# Patient Record
Sex: FEMALE | Race: WHITE | NOT HISPANIC OR LATINO | Employment: OTHER | URBAN - METROPOLITAN AREA
[De-identification: names, ages, dates, MRNs, and addresses within clinical notes are randomized per-mention and may not be internally consistent; named-entity substitution may affect disease eponyms.]

---

## 2017-01-26 ENCOUNTER — OFFICE VISIT (OUTPATIENT)
Dept: FAMILY MEDICINE | Facility: CLINIC | Age: 77
End: 2017-01-26
Payer: MEDICARE

## 2017-01-26 VITALS
DIASTOLIC BLOOD PRESSURE: 82 MMHG | HEIGHT: 65 IN | HEART RATE: 108 BPM | BODY MASS INDEX: 29.57 KG/M2 | OXYGEN SATURATION: 95 % | WEIGHT: 177.5 LBS | SYSTOLIC BLOOD PRESSURE: 142 MMHG

## 2017-01-26 PROCEDURE — 99213 OFFICE O/P EST LOW 20 MIN: CPT | Mod: S$GLB,,, | Performed by: FAMILY MEDICINE

## 2017-01-26 PROCEDURE — 99999 PR PBB SHADOW E&M-EST. PATIENT-LVL III: CPT | Mod: PBBFAC,,, | Performed by: FAMILY MEDICINE

## 2017-01-26 NOTE — MR AVS SNAPSHOT
[facility]  [address] [facility] [address]  [facility] [address]  Phone: [phone]  Fax: [phone]                  Sari Biswas   2017 3:20 PM   Office Visit    Description:  Female : 1940   Provider:  Turner Anne MD   Department:  [facility]           Reason for Visit     Urinary Tract Infection                To Do List           Goals (5 Years of Data)     None      [facility]     [facility] On [facility] Care Line -  Assistance  Registered nurses in the [facility] Center provide clinical advisement, health education, appointment booking, and other advisory services.  Call for this free service at [phone].             Medications           Message regarding Medications     Verify the changes and/or additions to your medication regime listed below are the same as discussed with your clinician today.  If any of these changes or additions are incorrect, please notify your healthcare provider.        STOP taking these medications     clobetasol (TEMOVATE) 0.05 % cream Apply twice daily to affected areas. Use as needed.           Verify that the below list of medications is an accurate representation of the medications you are currently taking.  If none reported, the list may be blank. If incorrect, please contact your healthcare provider. Carry this list with you in case of emergency.           Current Medications     amlodipine (NORVASC) 5 MG tablet Take 1 tablet (5 mg total) by mouth once daily.    CALCIUM CARBONATE/VITAMIN D3 (CALCIUM+D ORAL) once daily. No Sig Provided    cranberry 400 mg Cap Take by mouth.    ERGOCALCIFEROL, VITAMIN D2, (VITAMIN D ORAL) Take by mouth once daily.     LACTOBACILLUS ACIDOPHILUS (PROBIOTIC ORAL) Take by mouth.    multivitamin-minerals-lutein (CENTRUM SILVER) Tab Every day    VIT C/E/ZN/COPPR/LUTEIN/ZEAXAN (PRESERVISION AREDS 2 ORAL) Take by mouth.           Clinical Reference Information           Vital  "Signs - Last Recorded  Most recent update: 1/26/2017  3:38 PM by Jo Jennings LPN    BP Pulse Ht Wt SpO2 BMI    (!) 142/82 108 5' 5" (1.651 m) 80.5 kg (177 lb 7.5 oz) 95% 29.53 kg/m2      Blood Pressure          Most Recent Value    BP  (!)  142/82      Allergies as of 1/26/2017     No Known Drug Allergies      Immunizations Administered on Date of Encounter - 1/26/2017     None      "

## 2017-01-29 NOTE — PROGRESS NOTES
Subjective:       Patient ID: Sari Biswas is a 76 y.o. female.    Chief Complaint: Rectocele    HPI     Reports that she has seen 2 gynecologists over the past 6 months. One reported that she had a cystocele and the other reported, she had a cystocele and rectocele.    Reports no urinary incontinence or retention.  Reports no constipation.  Reports lemon sized vaginal mass while straining that is uncomfortable but not painful.        Review of Systems      Review of Systems   Constitutional: Negative for fever and chills.   HENT: Negative for hearing loss and neck stiffness.    Eyes: Negative for redness and itching.   Respiratory: Negative for cough and choking.    Cardiovascular: Negative for chest pain and leg swelling.  Abdomen: Negative for abdominal pain and blood in stool.   Genitourinary: Negative for dysuria and flank pain.   Musculoskeletal: Negative for back pain and gait problem.   Neurological: Negative for light-headedness and headaches.   Hematological: Negative for adenopathy.   Psychiatric/Behavioral: Negative for behavioral problems.     Objective:      Physical Exam   Constitutional: She appears well-developed.   HENT:   Head: Normocephalic and atraumatic.   Eyes: Conjunctivae are normal. Pupils are equal, round, and reactive to light.   Neck: Normal range of motion.   Cardiovascular: Normal rate and regular rhythm.    No murmur heard.  Pulmonary/Chest: Effort normal and breath sounds normal. She has no wheezes.   Lymphadenopathy:     She has no cervical adenopathy.       Assessment:       1. Cystocele, unspecified cystocele location        Plan:       Cystocele, unspecified cystocele location          Patient will f/u with her gynecologist in regards to possible surgery. Currently, asymptomatic.

## 2017-02-09 ENCOUNTER — OFFICE VISIT (OUTPATIENT)
Dept: OBSTETRICS AND GYNECOLOGY | Facility: CLINIC | Age: 77
End: 2017-02-09
Payer: MEDICARE

## 2017-02-09 VITALS — BODY MASS INDEX: 29.79 KG/M2 | HEIGHT: 65 IN | WEIGHT: 178.81 LBS

## 2017-02-09 DIAGNOSIS — N39.0 RECURRENT UTI: Primary | ICD-10-CM

## 2017-02-09 PROCEDURE — 99999 PR PBB SHADOW E&M-EST. PATIENT-LVL III: CPT | Mod: PBBFAC,,, | Performed by: OBSTETRICS & GYNECOLOGY

## 2017-02-09 PROCEDURE — 99213 OFFICE O/P EST LOW 20 MIN: CPT | Mod: S$GLB,,, | Performed by: OBSTETRICS & GYNECOLOGY

## 2017-02-09 RX ORDER — NITROFURANTOIN 25; 75 MG/1; MG/1
CAPSULE ORAL
Refills: 0 | COMMUNITY
Start: 2017-02-04 | End: 2017-06-07

## 2017-02-09 NOTE — MR AVS SNAPSHOT
"    [facility]  [address] [place] [address]  Phone: [phone]                  Sari Biswas   2017 3:00 PM   Office Visit    Description:  Female : 1940   Provider:  Deuce Young MD   Department:  [facility]           Reason for Visit     Advice Only                To Do List           Goals (5 Years of Data)     None      [facility]     [facility] On Call Nurse Care Line -  Assistance  Registered nurses in the [facility] On Call Center provide clinical advisement, health education, appointment booking, and other advisory services.  Call for this free service at [phone].             Medications           Message regarding Medications     Verify the changes and/or additions to your medication regime listed below are the same as discussed with your clinician today.  If any of these changes or additions are incorrect, please notify your healthcare provider.             Verify that the below list of medications is an accurate representation of the medications you are currently taking.  If none reported, the list may be blank. If incorrect, please contact your healthcare provider. Carry this list with you in case of emergency.           Current Medications     amlodipine (NORVASC) 5 MG tablet Take 1 tablet (5 mg total) by mouth once daily.    CALCIUM CARBONATE/VITAMIN D3 (CALCIUM+D ORAL) once daily. No Sig Provided    cranberry 400 mg Cap Take by mouth.    ERGOCALCIFEROL, VITAMIN D2, (VITAMIN D ORAL) Take by mouth once daily.     LACTOBACILLUS ACIDOPHILUS (PROBIOTIC ORAL) Take by mouth.    multivitamin-minerals-lutein (CENTRUM SILVER) Tab Every day    nitrofurantoin, macrocrystal-monohydrate, (MACROBID) 100 MG capsule TAKE 1 CAPSULE (ORAL) 2 TIMES PER DAY FOR 10 DAYS    VIT C/E/ZN/COPPR/LUTEIN/ZEAXAN (PRESERVISION AREDS 2 ORAL) Take by mouth.           Clinical Reference Information           Your Vitals Were     Height Weight BMI          5' 5" " (1.651 m) 81.1 kg (178 lb 12.7 oz) 29.75 kg/m2        Allergies as of 2/9/2017     No Known Drug Allergies      Immunizations Administered on Date of Encounter - 2/9/2017     None      Language Assistance Services     ATTENTION: Language assistance services are available, free of charge. Please call [phone].      ATENCIÓN: Si habla español, tiene a malagon disposición servicios gratuitos de asistencia lingüística. Llame al [phone].     NILES Ý: N?u b?n nói Ti?ng Vi?t, có các d?ch v? h? tr? ngôn ng? mi?n phí dành cho b?n. G?i s? [phone].         [facility] complies with applicable Federal civil rights laws and does not discriminate on the basis of race, color, national origin, age, disability, or sex.

## 2017-02-20 RX ORDER — AMLODIPINE BESYLATE 5 MG/1
5 TABLET ORAL DAILY
Qty: 90 TABLET | Refills: 3 | Status: SHIPPED | OUTPATIENT
Start: 2017-02-20 | End: 2018-01-29 | Stop reason: SDUPTHER

## 2017-02-20 NOTE — TELEPHONE ENCOUNTER
----- Message from Pat Grewal sent at 2/20/2017 10:10 AM CST -----  Patient needs a refill on Amlodipine Generic 5 mg a year prescription called into [facility] pharmacy.  Please call patient at [phone] if you have any questions. Thanks!

## 2017-04-07 ENCOUNTER — TELEPHONE (OUTPATIENT)
Dept: FAMILY MEDICINE | Facility: CLINIC | Age: 77
End: 2017-04-07

## 2017-04-07 DIAGNOSIS — E78.5 HYPERLIPIDEMIA, UNSPECIFIED HYPERLIPIDEMIA TYPE: Primary | ICD-10-CM

## 2017-04-07 NOTE — TELEPHONE ENCOUNTER
"Pt has appt for "physical" in June. Wants labs done prior. Pended, when would you like them done? Please advise.  "

## 2017-04-07 NOTE — TELEPHONE ENCOUNTER
----- Message from Maci Manzanares sent at 4/7/2017  9:23 AM CDT -----  Contact: Self  [phone]  Patient called asking for labs prior to the physical. Thanks!

## 2017-05-31 ENCOUNTER — LAB VISIT (OUTPATIENT)
Dept: LAB | Facility: HOSPITAL | Age: 77
End: 2017-05-31
Attending: FAMILY MEDICINE
Payer: MEDICARE

## 2017-05-31 DIAGNOSIS — E78.5 HYPERLIPIDEMIA, UNSPECIFIED HYPERLIPIDEMIA TYPE: ICD-10-CM

## 2017-05-31 LAB
ALBUMIN SERPL BCP-MCNC: 3.4 G/DL
ALP SERPL-CCNC: 104 U/L
ALT SERPL W/O P-5'-P-CCNC: 15 U/L
ANION GAP SERPL CALC-SCNC: 9 MMOL/L
AST SERPL-CCNC: 19 U/L
BILIRUB SERPL-MCNC: 0.5 MG/DL
BUN SERPL-MCNC: 16 MG/DL
CALCIUM SERPL-MCNC: 9.3 MG/DL
CHLORIDE SERPL-SCNC: 104 MMOL/L
CHOLEST/HDLC SERPL: 4.4 {RATIO}
CO2 SERPL-SCNC: 28 MMOL/L
CREAT SERPL-MCNC: 0.8 MG/DL
EST. GFR  (AFRICAN AMERICAN): >60 ML/MIN/1.73 M^2
EST. GFR  (NON AFRICAN AMERICAN): >60 ML/MIN/1.73 M^2
GLUCOSE SERPL-MCNC: 94 MG/DL
HDL/CHOLESTEROL RATIO: 22.7 %
HDLC SERPL-MCNC: 238 MG/DL
HDLC SERPL-MCNC: 54 MG/DL
LDLC SERPL CALC-MCNC: 151.2 MG/DL
NONHDLC SERPL-MCNC: 184 MG/DL
POTASSIUM SERPL-SCNC: 4.4 MMOL/L
PROT SERPL-MCNC: 7.1 G/DL
SODIUM SERPL-SCNC: 141 MMOL/L
TRIGL SERPL-MCNC: 164 MG/DL
TSH SERPL DL<=0.005 MIU/L-ACNC: 1.53 UIU/ML

## 2017-05-31 PROCEDURE — 80053 COMPREHEN METABOLIC PANEL: CPT

## 2017-05-31 PROCEDURE — 84443 ASSAY THYROID STIM HORMONE: CPT

## 2017-05-31 PROCEDURE — 80061 LIPID PANEL: CPT

## 2017-05-31 PROCEDURE — 36415 COLL VENOUS BLD VENIPUNCTURE: CPT | Mod: PO

## 2017-06-07 ENCOUNTER — OFFICE VISIT (OUTPATIENT)
Dept: FAMILY MEDICINE | Facility: CLINIC | Age: 77
End: 2017-06-07
Payer: MEDICARE

## 2017-06-07 VITALS
HEART RATE: 77 BPM | SYSTOLIC BLOOD PRESSURE: 122 MMHG | HEIGHT: 65 IN | WEIGHT: 177.25 LBS | DIASTOLIC BLOOD PRESSURE: 64 MMHG | BODY MASS INDEX: 29.53 KG/M2 | OXYGEN SATURATION: 96 %

## 2017-06-07 DIAGNOSIS — I10 ESSENTIAL HYPERTENSION: ICD-10-CM

## 2017-06-07 DIAGNOSIS — Z00.00 ROUTINE MEDICAL EXAM: Primary | ICD-10-CM

## 2017-06-07 DIAGNOSIS — R68.89 LACK OF INTEREST: ICD-10-CM

## 2017-06-07 PROCEDURE — 99397 PER PM REEVAL EST PAT 65+ YR: CPT | Mod: S$GLB,,, | Performed by: FAMILY MEDICINE

## 2017-06-07 PROCEDURE — 99499 UNLISTED E&M SERVICE: CPT | Mod: S$GLB,,, | Performed by: FAMILY MEDICINE

## 2017-06-07 PROCEDURE — G0009 ADMIN PNEUMOCOCCAL VACCINE: HCPCS | Mod: S$GLB,,, | Performed by: FAMILY MEDICINE

## 2017-06-07 PROCEDURE — 90732 PPSV23 VACC 2 YRS+ SUBQ/IM: CPT | Mod: S$GLB,,, | Performed by: FAMILY MEDICINE

## 2017-06-07 PROCEDURE — 99999 PR PBB SHADOW E&M-EST. PATIENT-LVL III: CPT | Mod: PBBFAC,,, | Performed by: FAMILY MEDICINE

## 2017-06-07 NOTE — PROGRESS NOTES
Subjective:       Patient ID: Sari Biswas is a 77 y.o. female.    Chief Complaint: Annual Exam; Fatigue; and Immunizations (PNEU )    HPI       Here for a check up.    Recent labs reviewed. Mild elevation in lipids.    htn stable.     Reports feeling lack of interest with gardening over the past 3 months.  No c/o feeling sad. No c/o cp or sob at rest or exertion.        Review of Systems      Review of Systems   Constitutional: Negative for fever and chills.   HENT: Negative for hearing loss and neck stiffness.    Eyes: Negative for redness and itching.   Respiratory: Negative for cough and choking.    Cardiovascular: Negative for chest pain and leg swelling.  Abdomen: Negative for abdominal pain and blood in stool.   Genitourinary: Negative for dysuria and flank pain.   Musculoskeletal: Negative for back pain and gait problem.   Neurological: Negative for light-headedness and headaches.   Hematological: Negative for adenopathy.   Psychiatric/Behavioral: Negative for behavioral problems.     Objective:      Physical Exam   Constitutional: She is oriented to person, place, and time. She appears well-developed. No distress.   HENT:   Head: Normocephalic and atraumatic.   Mouth/Throat: Oropharynx is clear and moist.   Eyes: Conjunctivae are normal. Pupils are equal, round, and reactive to light.   Neck: Normal range of motion. Neck supple. No thyromegaly present.   Cardiovascular: Normal rate, regular rhythm and normal heart sounds.  Exam reveals no friction rub.    No murmur heard.  Pulmonary/Chest: Effort normal and breath sounds normal. She has no wheezes. She has no rales.   Abdominal: Soft. Bowel sounds are normal. She exhibits no distension and no mass. There is no tenderness.   Musculoskeletal: Normal range of motion. She exhibits no edema or tenderness.   Lymphadenopathy:     She has no cervical adenopathy.   Neurological: She is alert and oriented to person, place, and time. She has normal reflexes. No  cranial nerve deficit.   Skin: Skin is warm. No rash noted. No erythema.   Psychiatric: She has a normal mood and affect. Thought content normal.       Assessment:       1. Routine medical exam    2. Essential hypertension    3. Lack of interest        Plan:       Routine medical exam    Essential hypertension    Lack of interest    Other orders  -     Pneumococcal Polysaccharide Vaccine (23 Valent) (SQ/IM)      Plan:  Pt refuses antidepressant. Pt will monitor her sxs.   Pneumovax 23 today

## 2017-10-19 ENCOUNTER — IMMUNIZATION (OUTPATIENT)
Dept: FAMILY MEDICINE | Facility: CLINIC | Age: 77
End: 2017-10-19
Payer: MEDICARE

## 2017-10-19 PROCEDURE — 90662 IIV NO PRSV INCREASED AG IM: CPT | Mod: S$GLB,,, | Performed by: INTERNAL MEDICINE

## 2017-10-19 PROCEDURE — G0008 ADMIN INFLUENZA VIRUS VAC: HCPCS | Mod: S$GLB,,, | Performed by: INTERNAL MEDICINE

## 2017-11-11 ENCOUNTER — NURSE TRIAGE (OUTPATIENT)
Dept: ADMINISTRATIVE | Facility: CLINIC | Age: 77
End: 2017-11-11

## 2017-11-11 NOTE — TELEPHONE ENCOUNTER
Reason for Disposition   BP  >= 180/110    Protocols used: ST HIGH BLOOD PRESSURE-A-AH    Pt calling with concerns of high blood pressure readings.  No other signs or symptoms other than on and off headaches.  Care advice given.

## 2017-12-21 ENCOUNTER — HOSPITAL ENCOUNTER (OUTPATIENT)
Dept: RADIOLOGY | Facility: HOSPITAL | Age: 77
Discharge: HOME | End: 2017-12-21
Attending: FAMILY MEDICINE
Payer: MEDICARE

## 2017-12-21 DIAGNOSIS — Z12.31 VISIT FOR SCREENING MAMMOGRAM: ICD-10-CM

## 2017-12-21 DIAGNOSIS — Z12.39 ENCOUNTER FOR SPECIAL SCREENING EXAMINATION FOR NEOPLASM OF BREAST: ICD-10-CM

## 2017-12-21 PROCEDURE — 77063 BREAST TOMOSYNTHESIS BI: CPT | Mod: 26,,, | Performed by: RADIOLOGY

## 2017-12-21 PROCEDURE — 77067 SCR MAMMO BI INCL CAD: CPT | Mod: 26,,, | Performed by: RADIOLOGY

## 2017-12-21 PROCEDURE — 77067 SCR MAMMO BI INCL CAD: CPT | Mod: TC,PO

## 2018-01-17 ENCOUNTER — TELEPHONE (OUTPATIENT)
Dept: ORTHOPEDICS | Facility: CLINIC | Age: 78
End: 2018-01-17

## 2018-01-17 NOTE — TELEPHONE ENCOUNTER
----- Message from Fariha Curran sent at 1/17/2018  2:33 PM CST -----  Contact: Patient  Sari, patient [phone], Calling for an appointment on 1/18/18 for a follow up on an emergency room visit at [facility] on 1/17/18 for a right shoulder trauma. Next available is 1/19/18. Patient declined. Patient is in pain and in a sling. Was given Rx Perocet.  Please advise. Thanks.

## 2018-01-18 ENCOUNTER — OFFICE VISIT (OUTPATIENT)
Dept: ORTHOPEDICS | Facility: CLINIC | Age: 78
End: 2018-01-18
Payer: MEDICARE

## 2018-01-18 VITALS — BODY MASS INDEX: 29.66 KG/M2 | WEIGHT: 178 LBS | HEIGHT: 65 IN

## 2018-01-18 DIAGNOSIS — M25.512 ACUTE PAIN OF LEFT SHOULDER: ICD-10-CM

## 2018-01-18 DIAGNOSIS — S42.202A CLOSED FRACTURE OF PROXIMAL END OF LEFT HUMERUS, UNSPECIFIED FRACTURE MORPHOLOGY, INITIAL ENCOUNTER: Primary | ICD-10-CM

## 2018-01-18 PROCEDURE — 99999 PR PBB SHADOW E&M-EST. PATIENT-LVL II: CPT | Mod: PBBFAC,,, | Performed by: ORTHOPAEDIC SURGERY

## 2018-01-18 PROCEDURE — 99214 OFFICE O/P EST MOD 30 MIN: CPT | Mod: 57,S$GLB,, | Performed by: ORTHOPAEDIC SURGERY

## 2018-01-18 PROCEDURE — 23600 CLTX PROX HUMRL FX W/O MNPJ: CPT | Mod: LT,S$GLB,, | Performed by: ORTHOPAEDIC SURGERY

## 2018-01-18 NOTE — PROGRESS NOTES
HISTORY OF PRESENT ILLNESS:  Sari Biswas,77 years old, yesterday fell on the   ice, landing onto her shoulder.  It has been hurting since then, 8/10 on the   pain scale, went to the Emergency Room, comes today here for followup.    Exam today shows she is tender at the proximal humerus.  Hand is functioning   well.  No deficits detected.  Skin is intact.  Compartments are soft.    X-rays show proximal humeral fracture with good alignment.    ASSESSMENT:  Proximal humeral fracture.    PLAN:  Sling.  Followup in two weeks' time as a postoperative visit with x-rays   of her left shoulder.        /ls [id number] blank(s)          PBB/HN  dd: 01/18/2018 14:57:34 (CST)  td: 01/19/2018 05:12:07 (CST)  Doc ID   #[id number]  Job ID #[id number]    CC:     Further History  Aching pain  Worse with activity  Relieved with rest  No other associated symptoms  No other radiation    Further Exam  Alert and oriented  Pleasant  Contralateral limb has appropriate range of motion for age and condition  Contralateral limb has appropriate strength for age and condition  Contralateral limb has appropriate stability  for age and condition  No adenopathy  Pulses are appropriate for current condition  Skin is intact        Chief Complaint    Chief Complaint   Patient presents with    Left Shoulder - Pain, Injury       HPI  Sari Biswas is a 77 y.o.  female who presents with       Past Medical History  Past Medical History:   Diagnosis Date    DVT (deep venous thrombosis) 2012    left calf    DVT (deep venous thrombosis)     Hypertension        Past Surgical History  Past Surgical History:   Procedure Laterality Date    APPENDECTOMY      BREAST BIOPSY Left     over 10 yrs. ago    CATARACT EXTRACTION W/  INTRAOCULAR LENS IMPLANT      CHOLECYSTECTOMY      HYSTERECTOMY      KNEE ARTHROSCOPY W/ DEBRIDEMENT  2012       Medications  Current Outpatient Prescriptions   Medication Sig    amlodipine (NORVASC) 5 MG tablet Take 1 tablet (5 mg  total) by mouth once daily.    biotin 1 mg tablet Take 1,000 mcg by mouth once daily.    CALCIUM CARBONATE/VITAMIN D3 (CALCIUM+D ORAL) Take 1 tablet by mouth once daily. No Sig Provided    cranberry 400 mg Cap Take 1 capsule by mouth.     ERGOCALCIFEROL, VITAMIN D2, (VITAMIN D ORAL) Take by mouth once daily.     LACTOBACILLUS ACIDOPHILUS (PROBIOTIC ORAL) Take 1 tablet by mouth once daily.     multivitamin-minerals-lutein (CENTRUM SILVER) Tab Take 1 tablet by mouth once daily. Every day    nebivolol (BYSTOLIC) 5 MG Tab Take 10 mg by mouth once daily.    oxyCODONE-acetaminophen (PERCOCET) 5-325 mg per tablet Take 1 tablet by mouth every 4 (four) hours as needed for Pain.    sertraline (ZOLOFT) 25 MG tablet Take 25 mg by mouth once daily.    VIT C/E/ZN/COPPR/LUTEIN/ZEAXAN (PRESERVISION AREDS 2 ORAL) Take 1 tablet by mouth 2 (two) times daily.      No current facility-administered medications for this visit.        Allergies  Review of patient's allergies indicates:   Allergen Reactions    No known drug allergies        Family History  Family History   Problem Relation Age of Onset    Cancer Father      lung    Cancer Brother      brain    Cancer Sister      breast    Breast cancer Sister 78    Breast cancer Paternal Aunt        Social History  Social History     Social History    Marital status:      Spouse name: N/A    Number of children: N/A    Years of education: N/A     Occupational History    Not on file.     Social History Main Topics    Smoking status: Former Smoker    Smokeless tobacco: Never Used      Comment: quit smoking in 1993.    Alcohol use Yes    Drug use: No    Sexual activity: Not on file     Other Topics Concern    Not on file     Social History Narrative    No narrative on file               Review of Systems     Constitutional: Negative    HENT: Negative  Eyes: Negative  Respiratory: Negative  Cardiovascular: Negative  Musculoskeletal: HPI  Skin:  Negative  Neurological: Negative  Hematological: Negative  Endocrine: Negative                 Physical Exam    There were no vitals filed for this visit.  Body mass index is 29.62 kg/m².  Physical Examination:     General appearance -  well appearing, and in no distress  Mental status - awake  Neck - supple  Chest -  symmetric air entry  Heart - normal rate   Abdomen - soft      Assessment     1. Closed fracture of proximal end of left humerus, unspecified fracture morphology, initial encounter    2. Acute pain of left shoulder          Plan

## 2018-01-24 ENCOUNTER — TELEPHONE (OUTPATIENT)
Dept: ORTHOPEDICS | Facility: CLINIC | Age: 78
End: 2018-01-24

## 2018-01-24 NOTE — TELEPHONE ENCOUNTER
----- Message from Mary Sutherland sent at 1/24/2018 10:18 AM CST -----  Contact: Self  Calling to speak with the nurse if the bruising and swelling is normal. Was seen last week for broken arm.  Please advise.

## 2018-01-29 ENCOUNTER — TELEPHONE (OUTPATIENT)
Dept: ORTHOPEDICS | Facility: CLINIC | Age: 78
End: 2018-01-29

## 2018-01-29 ENCOUNTER — HOSPITAL ENCOUNTER (OUTPATIENT)
Dept: RADIOLOGY | Facility: HOSPITAL | Age: 78
Discharge: HOME | End: 2018-01-29
Attending: ORTHOPAEDIC SURGERY
Payer: MEDICARE

## 2018-01-29 ENCOUNTER — OFFICE VISIT (OUTPATIENT)
Dept: ORTHOPEDICS | Facility: CLINIC | Age: 78
End: 2018-01-29
Payer: MEDICARE

## 2018-01-29 VITALS — WEIGHT: 178 LBS | HEIGHT: 65 IN | BODY MASS INDEX: 29.66 KG/M2

## 2018-01-29 DIAGNOSIS — M25.512 CHRONIC LEFT SHOULDER PAIN: Primary | ICD-10-CM

## 2018-01-29 DIAGNOSIS — G89.29 CHRONIC LEFT SHOULDER PAIN: Primary | ICD-10-CM

## 2018-01-29 DIAGNOSIS — M25.512 CHRONIC LEFT SHOULDER PAIN: ICD-10-CM

## 2018-01-29 DIAGNOSIS — M25.512 ACUTE PAIN OF LEFT SHOULDER: ICD-10-CM

## 2018-01-29 DIAGNOSIS — S42.202A CLOSED FRACTURE OF PROXIMAL END OF LEFT HUMERUS, UNSPECIFIED FRACTURE MORPHOLOGY, INITIAL ENCOUNTER: Primary | ICD-10-CM

## 2018-01-29 DIAGNOSIS — G89.29 CHRONIC LEFT SHOULDER PAIN: ICD-10-CM

## 2018-01-29 PROCEDURE — 99024 POSTOP FOLLOW-UP VISIT: CPT | Mod: S$GLB,,, | Performed by: ORTHOPAEDIC SURGERY

## 2018-01-29 PROCEDURE — 99999 PR PBB SHADOW E&M-EST. PATIENT-LVL II: CPT | Mod: PBBFAC,,, | Performed by: ORTHOPAEDIC SURGERY

## 2018-01-29 PROCEDURE — 73030 X-RAY EXAM OF SHOULDER: CPT | Mod: TC,PO,LT

## 2018-01-29 PROCEDURE — 73030 X-RAY EXAM OF SHOULDER: CPT | Mod: 26,LT,, | Performed by: RADIOLOGY

## 2018-01-29 NOTE — PROGRESS NOTES
Sari Biswas, 77 years old.  She is close to two weeks out from a proximal   humeral fracture, doing a little better.  Pain is decreasing.  Does have some   bruising in her arms, she wanted to have it checked out, which is appropriate   for her injury.    X-rays show maintenance of position.    ASSESSMENT:  Proximal humeral fracture.    PLAN:  Continue with the sling, gentle range of motion of elbow, slowly make   C[place] exercises.  Follow up in five weeks' time as a postoperative visit with   x-rays of her humerus.      JULIANNA/JOLENE  dd: 01/29/2018 13:33:16 (CST)  td: 01/30/2018 01:45:35 (CST)  Doc ID   #[id number]  Job ID #[id number]    CC:

## 2018-01-29 NOTE — TELEPHONE ENCOUNTER
----- Message from Mary Sutherland sent at 1/29/2018 10:49 AM CST -----  Contact: Self  Patient is calling to speak with the nurse about the swelling and bruising on her arm.  Has an appointment for today at 1:00 and wanted to know if she should keep that or not.  Please advise.

## 2018-01-30 RX ORDER — AMLODIPINE BESYLATE 5 MG/1
5 TABLET ORAL DAILY
Qty: 90 TABLET | Refills: 3 | Status: SHIPPED | OUTPATIENT
Start: 2018-01-30 | End: 2018-09-13

## 2018-02-20 ENCOUNTER — TELEPHONE (OUTPATIENT)
Dept: ORTHOPEDICS | Facility: CLINIC | Age: 78
End: 2018-02-20

## 2018-02-20 NOTE — TELEPHONE ENCOUNTER
----- Message from Mary Sutherland sent at 2/20/2018 10:51 AM CST -----  Contact: Self  Patient is calling with questions regarding her sling.  Please call.

## 2018-02-20 NOTE — TELEPHONE ENCOUNTER
Notified patient to wear sling if possible. Patient stated that she would like to take out of sling while in bed. Notified patient that it was ok just try not to use it to get up.

## 2018-02-27 DIAGNOSIS — M79.602 LEFT ARM PAIN: Primary | ICD-10-CM

## 2018-03-05 ENCOUNTER — OFFICE VISIT (OUTPATIENT)
Dept: ORTHOPEDICS | Facility: CLINIC | Age: 78
End: 2018-03-05
Payer: MEDICARE

## 2018-03-05 ENCOUNTER — HOSPITAL ENCOUNTER (OUTPATIENT)
Dept: RADIOLOGY | Facility: HOSPITAL | Age: 78
Discharge: HOME | End: 2018-03-05
Attending: ORTHOPAEDIC SURGERY
Payer: MEDICARE

## 2018-03-05 VITALS — HEIGHT: 65 IN | BODY MASS INDEX: 29.66 KG/M2 | WEIGHT: 178 LBS

## 2018-03-05 DIAGNOSIS — S42.202D CLOSED FRACTURE OF PROXIMAL END OF LEFT HUMERUS WITH ROUTINE HEALING, UNSPECIFIED FRACTURE MORPHOLOGY, SUBSEQUENT ENCOUNTER: ICD-10-CM

## 2018-03-05 DIAGNOSIS — M25.512 ACUTE PAIN OF LEFT SHOULDER: Primary | ICD-10-CM

## 2018-03-05 DIAGNOSIS — M79.602 LEFT ARM PAIN: ICD-10-CM

## 2018-03-05 PROCEDURE — 73060 X-RAY EXAM OF HUMERUS: CPT | Mod: 26,LT,, | Performed by: RADIOLOGY

## 2018-03-05 PROCEDURE — 73060 X-RAY EXAM OF HUMERUS: CPT | Mod: TC,PO,LT

## 2018-03-05 PROCEDURE — 99024 POSTOP FOLLOW-UP VISIT: CPT | Mod: S$GLB,,, | Performed by: ORTHOPAEDIC SURGERY

## 2018-03-05 PROCEDURE — 99999 PR PBB SHADOW E&M-EST. PATIENT-LVL II: CPT | Mod: PBBFAC,,, | Performed by: ORTHOPAEDIC SURGERY

## 2018-03-05 NOTE — PROGRESS NOTES
Sari Biswas, six weeks out from a proximal humeral fracture, doing better.    Pain is 1/10 on the pain scale.    Exam showed no signs of infection.    X-rays show callus formation with acceptable alignment.    ASSESSMENT:  Healing proximal humeral fracture.    PLAN:  We will get more aggressive with her range of motion exercises.  We   offered her therapy.  She wants to do it on her own.  We will see her back in   six weeks' time as a postoperative visit with x-rays of her left humerus.      JULIANNA/JOLENE  dd: 03/05/2018 12:06:38 (CST)  td: 03/06/2018 05:49:38 (CST)  Doc ID   #[id number]  Job ID #[id number]    CC:

## 2018-03-07 ENCOUNTER — TELEPHONE (OUTPATIENT)
Dept: ORTHOPEDICS | Facility: CLINIC | Age: 78
End: 2018-03-07

## 2018-03-07 NOTE — TELEPHONE ENCOUNTER
----- Message from Mary Sutherland sent at 3/7/2018  2:28 PM CST -----  Contact: Self  Patient is calling to ask the nurse about her xray results she read on [facility].  Has questions.  Please call.

## 2018-03-07 NOTE — TELEPHONE ENCOUNTER
Patient was concerned about her radiology report concerning her lung portion part of the xray. Patient was told that there were no concerns but if she was concerned she would need to contact her pcp for more details.

## 2018-04-02 ENCOUNTER — LAB VISIT (OUTPATIENT)
Dept: LAB | Facility: HOSPITAL | Age: 78
End: 2018-04-02
Attending: INTERNAL MEDICINE
Payer: MEDICARE

## 2018-04-02 DIAGNOSIS — Z86.718 PERSONAL HISTORY OF VENOUS THROMBOSIS AND EMBOLISM: Primary | ICD-10-CM

## 2018-04-02 LAB — HCYS SERPL-SCNC: 9 UMOL/L

## 2018-04-02 PROCEDURE — 85613 RUSSELL VIPER VENOM DILUTED: CPT

## 2018-04-02 PROCEDURE — 83090 ASSAY OF HOMOCYSTEINE: CPT

## 2018-04-02 PROCEDURE — 36415 COLL VENOUS BLD VENIPUNCTURE: CPT | Mod: PO

## 2018-04-02 PROCEDURE — 81241 F5 GENE: CPT

## 2018-04-02 PROCEDURE — 81240 F2 GENE: CPT

## 2018-04-03 LAB — LA PPP-IMP: NEGATIVE

## 2018-04-04 LAB
F2 GENE MUT ANL BLD/T: NORMAL
F5 P.R506Q BLD/T QL: NORMAL

## 2018-04-11 DIAGNOSIS — M89.8X2 PAIN OF LEFT HUMERUS: Primary | ICD-10-CM

## 2018-04-16 ENCOUNTER — HOSPITAL ENCOUNTER (OUTPATIENT)
Dept: RADIOLOGY | Facility: HOSPITAL | Age: 78
Discharge: HOME | End: 2018-04-16
Attending: ORTHOPAEDIC SURGERY
Payer: MEDICARE

## 2018-04-16 ENCOUNTER — OFFICE VISIT (OUTPATIENT)
Dept: ORTHOPEDICS | Facility: CLINIC | Age: 78
End: 2018-04-16
Payer: MEDICARE

## 2018-04-16 VITALS — BODY MASS INDEX: 29.66 KG/M2 | HEIGHT: 65 IN | WEIGHT: 178 LBS

## 2018-04-16 DIAGNOSIS — S42.202D CLOSED FRACTURE OF PROXIMAL END OF LEFT HUMERUS WITH ROUTINE HEALING, UNSPECIFIED FRACTURE MORPHOLOGY, SUBSEQUENT ENCOUNTER: Primary | ICD-10-CM

## 2018-04-16 DIAGNOSIS — M89.8X2 PAIN OF LEFT HUMERUS: ICD-10-CM

## 2018-04-16 PROCEDURE — 73060 X-RAY EXAM OF HUMERUS: CPT | Mod: 26,LT,, | Performed by: RADIOLOGY

## 2018-04-16 PROCEDURE — 73060 X-RAY EXAM OF HUMERUS: CPT | Mod: TC,PO,LT

## 2018-04-16 PROCEDURE — 99999 PR PBB SHADOW E&M-EST. PATIENT-LVL II: CPT | Mod: PBBFAC,,, | Performed by: ORTHOPAEDIC SURGERY

## 2018-04-16 PROCEDURE — 99024 POSTOP FOLLOW-UP VISIT: CPT | Mod: S$GLB,,, | Performed by: ORTHOPAEDIC SURGERY

## 2018-04-16 NOTE — PROGRESS NOTES
Sari Royalaux about three months out from her proximal humeral fracture, doing   well.  No pain, nontender.    X-rays show healing proximal fracture.    ASSESSMENT:  Healing proximal humerus fracture.    PLAN:  Continue with range of motion and strengthening exercises.  We will check   her back here in six weeks' time as an established patient with repeat x-rays   of her left humerus.      JULIANNA/JOLENE  dd: 04/16/2018 10:53:30 (CDT)  td: 04/17/2018 05:28:58 (CDT)  Doc ID   #[id number]  Job ID #[id number]    CC:

## 2018-04-17 ENCOUNTER — TELEPHONE (OUTPATIENT)
Dept: FAMILY MEDICINE | Facility: CLINIC | Age: 78
End: 2018-04-17

## 2018-04-17 DIAGNOSIS — E78.5 HYPERLIPIDEMIA, UNSPECIFIED HYPERLIPIDEMIA TYPE: Primary | ICD-10-CM

## 2018-04-17 NOTE — TELEPHONE ENCOUNTER
----- Message from Carrie Rodriguez sent at 4/17/2018 10:14 AM CDT -----  Contact: self  Patient called regarding scheduling her labs, has an annual appt scheduled in June. Please contact [phone] (fsiw)

## 2018-04-17 NOTE — TELEPHONE ENCOUNTER
Phoned pt in regards to message. Ordered and scheduled CMP and Lipid 1 week prior to annual exam. Please review and advise on any other labs Dr Anne may want ordered. Thank you. CLC

## 2018-05-16 ENCOUNTER — OFFICE VISIT (OUTPATIENT)
Dept: OBSTETRICS AND GYNECOLOGY | Facility: CLINIC | Age: 78
End: 2018-05-16
Payer: MEDICARE

## 2018-05-16 VITALS
RESPIRATION RATE: 18 BRPM | HEIGHT: 65 IN | WEIGHT: 178.81 LBS | SYSTOLIC BLOOD PRESSURE: 126 MMHG | DIASTOLIC BLOOD PRESSURE: 72 MMHG | BODY MASS INDEX: 29.79 KG/M2

## 2018-05-16 DIAGNOSIS — N81.6 RECTOCELE: Primary | ICD-10-CM

## 2018-05-16 PROCEDURE — G0101 CA SCREEN;PELVIC/BREAST EXAM: HCPCS | Mod: S$GLB,,, | Performed by: OBSTETRICS & GYNECOLOGY

## 2018-05-16 PROCEDURE — 99999 PR PBB SHADOW E&M-EST. PATIENT-LVL III: CPT | Mod: PBBFAC,,, | Performed by: OBSTETRICS & GYNECOLOGY

## 2018-05-16 RX ORDER — METOPROLOL SUCCINATE 25 MG/1
12.5 TABLET, EXTENDED RELEASE ORAL 2 TIMES DAILY
COMMUNITY
Start: 2018-03-20 | End: 2019-01-29 | Stop reason: SDUPTHER

## 2018-05-16 RX ORDER — B,C/FERROUS FUM/FA/D3/ZINC OX 8MG-800MCG
1 TABLET ORAL 2 TIMES DAILY
COMMUNITY
End: 2018-09-13

## 2018-05-16 NOTE — PROGRESS NOTES
Chief Complaint   Patient presents with    Well Woman       History and Physical:  No LMP recorded. Patient has had a hysterectomy.       Sari Biswas is a 77 y.o.  female who presents today for her routine annual GYN exam. The patient has no Gynecology complaints today. Rectocele known but no pain or rectal splinting, no bowel or bladder complaints. Mammogram due 12/2018.       Allergies:   Review of patient's allergies indicates:   Allergen Reactions    No known drug allergies        Past Medical History:   Diagnosis Date    DVT (deep venous thrombosis) 2012    left calf    DVT (deep venous thrombosis)     Hypertension        Past Surgical History:   Procedure Laterality Date    APPENDECTOMY      BREAST BIOPSY Left     over 10 yrs. ago    CATARACT EXTRACTION W/  INTRAOCULAR LENS IMPLANT      CHOLECYSTECTOMY      HYSTERECTOMY      KNEE ARTHROSCOPY W/ DEBRIDEMENT  2012       MEDS:   Current Outpatient Prescriptions on File Prior to Visit   Medication Sig Dispense Refill    amLODIPine (NORVASC) 5 MG tablet Take 1 tablet (5 mg total) by mouth once daily. 90 tablet 3    biotin 1 mg tablet Take 1,000 mcg by mouth once daily.      CALCIUM CARBONATE/VITAMIN D3 (CALCIUM+D ORAL) Take 1 tablet by mouth once daily. No Sig Provided      cranberry 400 mg Cap Take 1 capsule by mouth.       ERGOCALCIFEROL, VITAMIN D2, (VITAMIN D ORAL) Take by mouth once daily.       LACTOBACILLUS ACIDOPHILUS (PROBIOTIC ORAL) Take 1 tablet by mouth once daily.       nebivolol (BYSTOLIC) 5 MG Tab Take 10 mg by mouth once daily.      oxyCODONE-acetaminophen (PERCOCET) 5-325 mg per tablet Take 1 tablet by mouth every 4 (four) hours as needed for Pain. 12 tablet 0    sertraline (ZOLOFT) 25 MG tablet Take 25 mg by mouth once daily.      VIT C/E/ZN/COPPR/LUTEIN/ZEAXAN (PRESERVISION AREDS 2 ORAL) Take 1 tablet by mouth 2 (two) times daily.       [DISCONTINUED] multivitamin-minerals-lutein (CENTRUM SILVER) Tab Take 1  "tablet by mouth once daily. Every day       No current facility-administered medications on file prior to visit.        OB History      Para Term  AB Living    5 2 2   1      SAB TAB Ectopic Multiple Live Births    1                  Social History     Social History    Marital status:      Spouse name: N/A    Number of children: N/A    Years of education: N/A     Occupational History    Not on file.     Social History Main Topics    Smoking status: Former Smoker    Smokeless tobacco: Never Used      Comment: quit smoking in .    Alcohol use Yes    Drug use: No    Sexual activity: Not on file     Other Topics Concern    Not on file     Social History Narrative    No narrative on file       Family History   Problem Relation Age of Onset    Cancer Father         lung    Cancer Brother         brain    Cancer Sister         breast    Breast cancer Sister 78    Breast cancer Paternal Aunt          Past medical and surgical history reviewed.   I have reviewed the patient's medical history in detail and updated the computerized patient record.        Review of System:   General: no chills, fever, night sweats, weight gain or weight loss  Psychological: no depression or suicidal ideation  Breasts: no new or changing breast lumps, nipple discharge or masses.  Respiratory: no cough, shortness of breath, or wheezing  Cardiovascular: no chest pain or dyspnea on exertion  Gastrointestinal: no abdominal pain, change in bowel habits, or black or bloody stools  Genito-Urinary: no incontinence, urinary frequency/urgency or vulvar/vaginal symptoms, pelvic pain or abnormal vaginal bleeding.  Musculoskeletal: no gait disturbance or muscular weakness      Physical Exam:   /72   Resp 18   Ht 5' 5" (1.651 m)   Wt 81.1 kg (178 lb 12.7 oz)   BMI 29.75 kg/m²   Constitutional: She is oriented to person, place, and time. She appears well-developed and well-nourished. No distress.   HENT: "   Head: Normocephalic and atraumatic.   Eyes: Conjunctivae and EOM are normal. No scleral icterus.   Neck: Normal range of motion. Neck supple. No tracheal deviation present.   Cardiovascular: Normal rate.    Pulmonary/Chest: Effort normal. No respiratory distress. She exhibits no tenderness.  Breasts: are symmetrical.   Right breast exhibits no inverted nipple, no mass, no nipple discharge, no skin change and no tenderness.   Left breast exhibits no inverted nipple, no mass, no nipple discharge, no skin change and no tenderness.  Abdominal: Soft. She exhibits no distension and no mass. There is no tenderness. There is no rebound and no guarding.   Genitourinary:    External rectal exam shows no thrombosed external hemorrhoids.    Pelvic exam was performed with patient supine.   No labial fusion.   There is no rash, lesion or injury on the right labia.   There is no rash, lesion or injury on the left labia.   No bleeding and no signs of injury around the vaginal introitus, urethra is without lesions and well supported.   No vaginal discharge found.    No significant Cystocele, Enterocele , Grade II low rectocele and cuff well supported.   Bimanual exam:   The urethra is normal to palpation and there are no palpable vaginal wall masses.   Right adnexum displays no mass and no tenderness.   Left adnexum displays no mass and no tenderness.  Musculoskeletal: Normal range of motion.   Lymphadenopathy: No inguinal adenopathy present.   Neurological: She is alert and oriented to person, place, and time. Coordination normal.   Skin: Skin is warm and dry. She is not diaphoretic.   Psychiatric: She has a normal mood and affect.      Assessment:   Normal annual GYN exam  Asymptomatic grade I-II rectocele    Plan:   PAP not needed  Mammogram when due  Follow up in 1 year.  Patient informed will be contacted with results within 2 weeks. Encouraged to please call back or email if she has not heard from us by then.

## 2018-06-05 ENCOUNTER — LAB VISIT (OUTPATIENT)
Dept: LAB | Facility: HOSPITAL | Age: 78
End: 2018-06-05
Attending: FAMILY MEDICINE
Payer: MEDICARE

## 2018-06-05 DIAGNOSIS — R94.31 NONSPECIFIC ABNORMAL ELECTROCARDIOGRAM (ECG) (EKG): ICD-10-CM

## 2018-06-05 DIAGNOSIS — E78.5 HYPERLIPIDEMIA, UNSPECIFIED HYPERLIPIDEMIA TYPE: ICD-10-CM

## 2018-06-05 DIAGNOSIS — I82.419: Primary | ICD-10-CM

## 2018-06-05 DIAGNOSIS — L63.9 ALOPECIA AREATA: ICD-10-CM

## 2018-06-05 DIAGNOSIS — Z82.49 FAMILY HISTORY OF ISCHEMIC HEART DISEASE: ICD-10-CM

## 2018-06-05 LAB
ALBUMIN SERPL BCP-MCNC: 3.6 G/DL
ALP SERPL-CCNC: 102 U/L
ALT SERPL W/O P-5'-P-CCNC: 14 U/L
ANION GAP SERPL CALC-SCNC: 7 MMOL/L
AST SERPL-CCNC: 19 U/L
BASOPHILS # BLD AUTO: 0.04 K/UL
BASOPHILS NFR BLD: 0.7 %
BILIRUB SERPL-MCNC: 0.8 MG/DL
BUN SERPL-MCNC: 19 MG/DL
CALCIUM SERPL-MCNC: 9.2 MG/DL
CHLORIDE SERPL-SCNC: 106 MMOL/L
CHOLEST SERPL-MCNC: 250 MG/DL
CHOLEST/HDLC SERPL: 3.7 {RATIO}
CO2 SERPL-SCNC: 29 MMOL/L
CREAT SERPL-MCNC: 0.8 MG/DL
DIFFERENTIAL METHOD: NORMAL
EOSINOPHIL # BLD AUTO: 0.3 K/UL
EOSINOPHIL NFR BLD: 4.2 %
ERYTHROCYTE [DISTWIDTH] IN BLOOD BY AUTOMATED COUNT: 13.6 %
EST. GFR  (AFRICAN AMERICAN): >60 ML/MIN/1.73 M^2
EST. GFR  (NON AFRICAN AMERICAN): >60 ML/MIN/1.73 M^2
GLUCOSE SERPL-MCNC: 98 MG/DL
HCT VFR BLD AUTO: 45.9 %
HDLC SERPL-MCNC: 67 MG/DL
HDLC SERPL: 26.8 %
HGB BLD-MCNC: 14.9 G/DL
IMM GRANULOCYTES # BLD AUTO: 0.02 K/UL
IMM GRANULOCYTES NFR BLD AUTO: 0.3 %
LDLC SERPL CALC-MCNC: 167 MG/DL
LYMPHOCYTES # BLD AUTO: 1.6 K/UL
LYMPHOCYTES NFR BLD: 26.2 %
MCH RBC QN AUTO: 30.7 PG
MCHC RBC AUTO-ENTMCNC: 32.5 G/DL
MCV RBC AUTO: 95 FL
MONOCYTES # BLD AUTO: 0.6 K/UL
MONOCYTES NFR BLD: 10.4 %
NEUTROPHILS # BLD AUTO: 3.6 K/UL
NEUTROPHILS NFR BLD: 58.2 %
NONHDLC SERPL-MCNC: 183 MG/DL
NRBC BLD-RTO: 0 /100 WBC
PLATELET # BLD AUTO: 194 K/UL
PMV BLD AUTO: 11.3 FL
POTASSIUM SERPL-SCNC: 4.8 MMOL/L
PROT SERPL-MCNC: 7.1 G/DL
RBC # BLD AUTO: 4.85 M/UL
SODIUM SERPL-SCNC: 142 MMOL/L
TRIGL SERPL-MCNC: 80 MG/DL
TSH SERPL DL<=0.005 MIU/L-ACNC: 1.2 UIU/ML
WBC # BLD AUTO: 6.14 K/UL

## 2018-06-05 PROCEDURE — 84443 ASSAY THYROID STIM HORMONE: CPT

## 2018-06-05 PROCEDURE — 36415 COLL VENOUS BLD VENIPUNCTURE: CPT | Mod: PO

## 2018-06-05 PROCEDURE — 80053 COMPREHEN METABOLIC PANEL: CPT

## 2018-06-05 PROCEDURE — 85025 COMPLETE CBC W/AUTO DIFF WBC: CPT

## 2018-06-05 PROCEDURE — 80061 LIPID PANEL: CPT

## 2018-06-12 ENCOUNTER — OFFICE VISIT (OUTPATIENT)
Dept: FAMILY MEDICINE | Facility: CLINIC | Age: 78
End: 2018-06-12
Payer: MEDICARE

## 2018-06-12 VITALS
HEART RATE: 77 BPM | HEIGHT: 65 IN | OXYGEN SATURATION: 99 % | DIASTOLIC BLOOD PRESSURE: 62 MMHG | BODY MASS INDEX: 30.23 KG/M2 | SYSTOLIC BLOOD PRESSURE: 122 MMHG | WEIGHT: 181.44 LBS

## 2018-06-12 DIAGNOSIS — F41.9 ANXIETY: ICD-10-CM

## 2018-06-12 DIAGNOSIS — I10 ESSENTIAL HYPERTENSION: ICD-10-CM

## 2018-06-12 DIAGNOSIS — Z00.00 ROUTINE MEDICAL EXAM: Primary | ICD-10-CM

## 2018-06-12 DIAGNOSIS — E78.5 HYPERLIPIDEMIA, UNSPECIFIED HYPERLIPIDEMIA TYPE: ICD-10-CM

## 2018-06-12 PROCEDURE — 3078F DIAST BP <80 MM HG: CPT | Mod: S$GLB,,, | Performed by: FAMILY MEDICINE

## 2018-06-12 PROCEDURE — 99999 PR PBB SHADOW E&M-EST. PATIENT-LVL III: CPT | Mod: PBBFAC,,, | Performed by: FAMILY MEDICINE

## 2018-06-12 PROCEDURE — 3074F SYST BP LT 130 MM HG: CPT | Mod: S$GLB,,, | Performed by: FAMILY MEDICINE

## 2018-06-12 PROCEDURE — 99397 PER PM REEVAL EST PAT 65+ YR: CPT | Mod: S$GLB,,, | Performed by: FAMILY MEDICINE

## 2018-06-12 NOTE — PROGRESS NOTES
Subjective:       Patient ID: Sari Biswas is a 78 y.o. female.    Chief Complaint: Annual Exam    HPI       Here for a check up.    Reviewed recent labs. Elevated lipid panel.      htn stable.    Planning to have an angiogram later this month. Will talk to Dr. Luu, cardiologist, about stain therapy re: elevated lipid panel.     Taking low dose zoloft since 11/2017 for anxiety management.        Review of Systems   Constitutional: Negative for activity change and unexpected weight change.   HENT: Negative for hearing loss, rhinorrhea and trouble swallowing.    Eyes: Negative for discharge and visual disturbance.   Respiratory: Negative for chest tightness and wheezing.    Cardiovascular: Positive for palpitations. Negative for chest pain.   Gastrointestinal: Negative for blood in stool, constipation, diarrhea and vomiting.   Endocrine: Negative for polydipsia and polyuria.   Genitourinary: Negative for difficulty urinating, dysuria, hematuria and menstrual problem.   Musculoskeletal: Positive for arthralgias and joint swelling. Negative for neck pain.   Neurological: Negative for weakness and headaches.   Psychiatric/Behavioral: Negative for confusion and dysphoric mood.       Objective:      Physical Exam   Constitutional: She is oriented to person, place, and time. She appears well-developed. No distress.   HENT:   Head: Normocephalic.   Mouth/Throat: Oropharynx is clear and moist.   Eyes: Pupils are equal, round, and reactive to light.   Neck: Normal range of motion. Neck supple. No thyromegaly present.   Cardiovascular: Normal rate, regular rhythm and normal heart sounds.  Exam reveals no friction rub.    No murmur heard.  Pulmonary/Chest: Breath sounds normal. She has no wheezes. She has no rales.   Abdominal: Soft. Bowel sounds are normal. She exhibits no distension and no mass. There is no tenderness.   Musculoskeletal: Normal range of motion. She exhibits no edema or tenderness.   Neurological: She is  alert and oriented to person, place, and time. She has normal reflexes. No cranial nerve deficit.   Skin: Skin is warm. No rash noted. No erythema.   Psychiatric: She has a normal mood and affect. Thought content normal.       Assessment:       1. Routine medical exam    2. Hyperlipidemia, unspecified hyperlipidemia type    3. Essential hypertension    4. Anxiety        Plan:       Routine medical exam    Hyperlipidemia, unspecified hyperlipidemia type    Essential hypertension    Anxiety      Plan:  Cont current meds      Medication List with Changes/Refills   Current Medications    AMLODIPINE (NORVASC) 5 MG TABLET    Take 1 tablet (5 mg total) by mouth once daily.    BIOTIN 1 MG TABLET    Take 1,000 mcg by mouth once daily.    CALCIUM CARBONATE/VITAMIN D3 (CALCIUM+D ORAL)    Take 1 tablet by mouth once daily. No Sig Provided    CRANBERRY 400 MG CAP    Take 1 capsule by mouth.     ERGOCALCIFEROL, VITAMIN D2, (VITAMIN D ORAL)    Take by mouth once daily.     LACTOBACILLUS ACIDOPHILUS (PROBIOTIC ORAL)    Take 1 tablet by mouth once daily.     MV-MIN-FA-D3-OM3-DHA-EPA-FISH (CARDIAMIN) 200 MCG-500 UNIT-200 MG CAP    Take by mouth.    SERTRALINE (ZOLOFT) 25 MG TABLET    Take 25 mg by mouth once daily.    TOPROL XL 25 MG 24 HR TABLET    Take 25 mg by mouth once daily.    VIT C/E/ZN/COPPR/LUTEIN/ZEAXAN (PRESERVISION AREDS 2 ORAL)    Take 1 tablet by mouth 2 (two) times daily.    Discontinued Medications    NEBIVOLOL (BYSTOLIC) 5 MG TAB    Take 10 mg by mouth once daily.    OXYCODONE-ACETAMINOPHEN (PERCOCET) 5-325 MG PER TABLET    Take 1 tablet by mouth every 4 (four) hours as needed for Pain.

## 2018-06-19 PROBLEM — I25.10 CORONARY ATHEROSCLEROSIS OF NATIVE CORONARY ARTERY: Status: ACTIVE | Noted: 2018-06-19

## 2018-07-11 PROBLEM — I25.10 CORONARY ARTERY DISEASE: Status: ACTIVE | Noted: 2018-07-11

## 2018-07-16 PROBLEM — Z78.9 KNOWLEDGE DEFICIT ABOUT THERAPEUTIC DIET: Status: ACTIVE | Noted: 2018-07-16

## 2018-07-16 PROBLEM — Z55.8 KNOWLEDGE DEFICIT ABOUT THERAPEUTIC DIET: Status: ACTIVE | Noted: 2018-07-16

## 2018-07-20 PROBLEM — Z55.8 KNOWLEDGE DEFICIT ABOUT THERAPEUTIC DIET: Status: RESOLVED | Noted: 2018-07-16 | Resolved: 2018-07-20

## 2018-07-20 PROBLEM — Z78.9 KNOWLEDGE DEFICIT ABOUT THERAPEUTIC DIET: Status: RESOLVED | Noted: 2018-07-16 | Resolved: 2018-07-20

## 2018-07-21 PROBLEM — I25.10 CORONARY ARTERY DISEASE INVOLVING NATIVE CORONARY ARTERY OF NATIVE HEART: Status: ACTIVE | Noted: 2018-07-21

## 2018-07-25 PROBLEM — I25.10 CORONARY ATHEROSCLEROSIS OF NATIVE CORONARY ARTERY: Status: RESOLVED | Noted: 2018-06-19 | Resolved: 2018-07-25

## 2018-07-25 PROBLEM — Z95.1 S/P CABG (CORONARY ARTERY BYPASS GRAFT): Status: ACTIVE | Noted: 2018-07-25

## 2018-07-25 PROBLEM — I50.9 CONGESTIVE HEART FAILURE (CHF): Status: ACTIVE | Noted: 2018-07-25

## 2018-07-25 PROBLEM — I50.9 CONGESTIVE HEART FAILURE (CHF): Status: RESOLVED | Noted: 2018-07-25 | Resolved: 2018-07-25

## 2018-07-25 PROBLEM — I48.91 ATRIAL FIBRILLATION: Status: ACTIVE | Noted: 2018-07-25

## 2018-07-26 ENCOUNTER — TELEPHONE (OUTPATIENT)
Dept: VASCULAR SURGERY | Facility: CLINIC | Age: 78
End: 2018-07-26

## 2018-07-26 NOTE — TELEPHONE ENCOUNTER
Left message advising appointment scheduled 2 weeks from today, 8/9/18 @ 2:15 pm.  Appointment confirmation mailed to home address.

## 2018-07-26 NOTE — TELEPHONE ENCOUNTER
----- Message from Junior Evans sent at 7/26/2018 10:11 AM CDT -----  Contact: patient  Type: Needs Medical Advice    Who Called:  patient  Symptoms (please be specific):    How long has patient had these symptoms:    Pharmacy name and phone #:    Best Call Back Number: [phone]  Additional Information: patient had surgery by  was advise to see her in two weeks,surgery was done 07/12, was discharged on yesterday for [facility]. Requesting call back to schedule post op appointment.couldn't book appointment

## 2018-07-28 PROBLEM — I82.A12 ACUTE DEEP VEIN THROMBOSIS (DVT) OF AXILLARY VEIN OF LEFT UPPER EXTREMITY: Status: ACTIVE | Noted: 2018-07-28

## 2018-07-28 PROBLEM — L03.90 CELLULITIS: Status: ACTIVE | Noted: 2018-07-28

## 2018-08-01 PROBLEM — I50.43 ACUTE ON CHRONIC COMBINED SYSTOLIC (CONGESTIVE) AND DIASTOLIC (CONGESTIVE) HEART FAILURE: Status: ACTIVE | Noted: 2018-07-25

## 2018-08-03 PROBLEM — S30.1XXA HEMATOMA OF GROIN: Status: ACTIVE | Noted: 2018-08-03

## 2018-08-06 ENCOUNTER — TELEPHONE (OUTPATIENT)
Dept: FAMILY MEDICINE | Facility: CLINIC | Age: 78
End: 2018-08-06

## 2018-08-06 NOTE — TELEPHONE ENCOUNTER
----- Message from Vangie Zamudio sent at 8/3/2018  3:45 PM CDT -----  Contact: Inna Horne with [employer] with [facility] calling to inform doctor patient is being discharge today 08/03/18 from [facility] and will be going to a skilled facility, [facility] today. Please call Inna at [phone] ext [phone]. Thanks!

## 2018-08-09 ENCOUNTER — OFFICE VISIT (OUTPATIENT)
Dept: VASCULAR SURGERY | Facility: CLINIC | Age: 78
End: 2018-08-09
Payer: MEDICARE

## 2018-08-09 VITALS
HEIGHT: 65 IN | WEIGHT: 185.88 LBS | SYSTOLIC BLOOD PRESSURE: 161 MMHG | DIASTOLIC BLOOD PRESSURE: 93 MMHG | HEART RATE: 85 BPM | BODY MASS INDEX: 30.97 KG/M2

## 2018-08-09 DIAGNOSIS — Z95.1 S/P CABG (CORONARY ARTERY BYPASS GRAFT): Primary | ICD-10-CM

## 2018-08-09 PROCEDURE — 99999 PR PBB SHADOW E&M-EST. PATIENT-LVL III: CPT | Mod: PBBFAC,,, | Performed by: THORACIC SURGERY (CARDIOTHORACIC VASCULAR SURGERY)

## 2018-08-09 PROCEDURE — 99024 POSTOP FOLLOW-UP VISIT: CPT | Mod: S$GLB,,, | Performed by: THORACIC SURGERY (CARDIOTHORACIC VASCULAR SURGERY)

## 2018-08-09 NOTE — PROGRESS NOTES
OFFICE NOTE     Mr. Biswas underwent emergency coronary artery bypass graft with oversew and   repair of perforated left anterior descending artery.  She was undergoing   percutaneous coronary intervention and the LAD perforated.  She went into   cardiogenic shock.  An intraaortic balloon pump was placed, and the patient was   taken emergently to the Operating Room after I explained to the family that   mortality and morbidity under this emergency situation was very high.    Postoperatively, the patient was extubated.  She was discharged with a LifeVest.    Echocardiogram showed a decreased left ventricular function.  She was sent to   be either a nursing home or skilled nursing facility, but was readmitted to the   hospital a few days later with a deep venous thrombosis of a vein in the left   upper extremity.  She also had developed either a hematoma or fluid collection   in her right groin and thigh.  The intraaortic balloon pump had been emergently   placed through the right common femoral artery, and the patient also had an   access in her right common femoral vein.  The patient is back at the facility.    On physical examination, she is awake and alert.  Her sternum is stable.  She   still has ecchymosis on the chest and in the left lower extremity.  Her biggest   complaint is edema of the lower extremities.  The right groin and thigh have   edema, and there are two openings with dressings on them.  These are small.  [facility].  She is still wearing   her LifeVest.  Heart has a regular rate and rhythm and lungs are clear.  She   will follow up with Dr. Colon who is her cardiologist.  She will return to see   me on a p.r.n. basis.      MINISTERIO  dd: 08/09/2018 16:51:19 (CDT)  td: 08/10/2018 07:17:17 (CDT)  Doc ID   #[id number]  Job ID #[id number]    CC:

## 2018-08-10 ENCOUNTER — TELEPHONE (OUTPATIENT)
Dept: FAMILY MEDICINE | Facility: CLINIC | Age: 78
End: 2018-08-10

## 2018-08-10 NOTE — TELEPHONE ENCOUNTER
----- Message from Sana Ellsworth sent at 8/10/2018 10:20 AM CDT -----  Contact: self  Patient had heart surgery at [facility] on 07 12 18 and was advised to followup with Dr Anne for as soon as possible/please call patient at [phone] ([facility]hab Center) to schedule appt as she needs an appt early next week

## 2018-08-13 ENCOUNTER — OFFICE VISIT (OUTPATIENT)
Dept: FAMILY MEDICINE | Facility: CLINIC | Age: 78
End: 2018-08-13
Payer: MEDICARE

## 2018-08-13 VITALS
DIASTOLIC BLOOD PRESSURE: 95 MMHG | BODY MASS INDEX: 30.27 KG/M2 | OXYGEN SATURATION: 96 % | WEIGHT: 181.69 LBS | HEIGHT: 65 IN | TEMPERATURE: 98 F | SYSTOLIC BLOOD PRESSURE: 121 MMHG | HEART RATE: 130 BPM

## 2018-08-13 DIAGNOSIS — Z95.1 S/P CABG (CORONARY ARTERY BYPASS GRAFT): ICD-10-CM

## 2018-08-13 DIAGNOSIS — I10 ESSENTIAL HYPERTENSION: Primary | ICD-10-CM

## 2018-08-13 DIAGNOSIS — I48.20 CHRONIC ATRIAL FIBRILLATION: ICD-10-CM

## 2018-08-13 DIAGNOSIS — S80.12XA HEMATOMA OF LEG, LEFT, INITIAL ENCOUNTER: ICD-10-CM

## 2018-08-13 DIAGNOSIS — S30.1XXS HEMATOMA OF GROIN, SEQUELA: ICD-10-CM

## 2018-08-13 PROCEDURE — 99214 OFFICE O/P EST MOD 30 MIN: CPT | Mod: S$GLB,,, | Performed by: FAMILY MEDICINE

## 2018-08-13 PROCEDURE — 3080F DIAST BP >= 90 MM HG: CPT | Mod: S$GLB,,, | Performed by: FAMILY MEDICINE

## 2018-08-13 PROCEDURE — 99999 PR PBB SHADOW E&M-EST. PATIENT-LVL III: CPT | Mod: PBBFAC,,, | Performed by: FAMILY MEDICINE

## 2018-08-13 PROCEDURE — 3074F SYST BP LT 130 MM HG: CPT | Mod: S$GLB,,, | Performed by: FAMILY MEDICINE

## 2018-08-13 RX ORDER — L. ACIDOPHILUS/L.BULGARICUS 1MM CELL
1 TABLET ORAL DAILY
COMMUNITY
End: 2018-09-13

## 2018-08-13 RX ORDER — FLUCONAZOLE 150 MG/1
150 TABLET ORAL
COMMUNITY
End: 2018-09-13

## 2018-08-13 RX ORDER — AMIODARONE HYDROCHLORIDE 200 MG/1
200 TABLET ORAL 2 TIMES DAILY
COMMUNITY
End: 2018-10-04

## 2018-08-13 NOTE — PROGRESS NOTES
Subjective:       Patient ID: Sari Biswas is a 78 y.o. female.    Chief Complaint: Hospital Follow Up    HPI     Here for [facility] f/u.    Initially was hospitalized from 7/11/18 to 7/25/18 for cardiac Tamponade/Cardiogenic shock following complex PCI requiring emergent CABG x 3. Complicated post-op period involving AF, CHF exacerbation, and weaning off ventilatory and vasopressor supports. Was discharged to snf.     Then readmitted to the [facility] medicine service for LUE DVT on 7/27/18 and discharged on 8/3/18.  During her stay, she was started on Lovenox for DVT. She was eventually transitioned to xarelto. She was placed on IV antibiotics for right groin cellulitis. She had an US to that area which showed possible hematoma or abscess. General surgery was consulted. Patient had spontaneous drainage from the site of the hematoma.  She was discharged on cefuroxime and doxy for right groin cellulitis. Also, discharged on xarelto.  Transferred back to snf and discharged on 8/11/18.     Here with daughter and .    Pt will see wound care tomorrow to address wound on right groin. Has packing in place. Pt reports less swelling. Also, has small hematoma on left medial distal thigh from the vein stripping related to cabg. No pain experienced.      Pt receiving home health physio, ot and nursing.        Review of Systems      Review of Systems   Constitutional: Negative for fever and chills.   HENT: Negative for hearing loss and neck stiffness.    Eyes: Negative for redness and itching.   Respiratory: Negative for cough and choking.    Cardiovascular: Negative for chest pain and leg swelling.  Abdomen: Negative for abdominal pain and blood in stool.   Genitourinary: Negative for dysuria and flank pain.   Musculoskeletal: Negative for back pain and gait problem.   Neurological: Negative for light-headedness and headaches.   Hematological: Negative for adenopathy.   Psychiatric/Behavioral: Negative for  behavioral problems.       Objective:      Physical Exam   Constitutional: She appears well-developed.   HENT:   Head: Normocephalic and atraumatic.   Eyes: Conjunctivae are normal. Pupils are equal, round, and reactive to light.   Neck: Normal range of motion.   Cardiovascular: Normal rate.   No murmur heard.  Irreg, irreg   Pulmonary/Chest: Effort normal and breath sounds normal. She has no wheezes.   Lymphadenopathy:     She has no cervical adenopathy.       Assessment:       1. Essential hypertension    2. Chronic atrial fibrillation    3. S/P CABG (coronary artery bypass graft)    4. Hematoma of groin, sequela    5. Hematoma of leg, left, initial encounter        Plan:       Essential hypertension    Chronic atrial fibrillation    S/P CABG (coronary artery bypass graft)    Hematoma of groin, sequela    Hematoma of leg, left, initial encounter            Plan:  Cont current meds  F/u with wound care dr tomorrow and gen surgery later this week  Cont with home health physio, ot and sn      Medication List with Changes/Refills   Current Medications    AMIODARONE (PACERONE) 200 MG TAB    Take 200 mg by mouth 2 (two) times daily.    AMLODIPINE (NORVASC) 5 MG TABLET    Take 1 tablet (5 mg total) by mouth once daily.    CALCIUM CARBONATE/VITAMIN D3 (CALCIUM+D ORAL)    Take 2 tablets by mouth once daily. No Sig Provided    CLOPIDOGREL (PLAVIX) 75 MG TABLET    Take 1 tablet (75 mg total) by mouth once daily.    CRANBERRY 400 MG CAP    Take 1 capsule by mouth.     ERGOCALCIFEROL, VITAMIN D2, (VITAMIN D ORAL)    Take 1 tablet by mouth once daily.     FERROUS SULFATE 325 (65 FE) MG EC TABLET    Take 1 tablet (325 mg total) by mouth 2 (two) times daily.    FLUCONAZOLE (DIFLUCAN) 150 MG TAB    Take 150 mg by mouth. One tablet by mouth every 72 hours    FUROSEMIDE (LASIX) 40 MG TABLET    Take 1 tablet (40 mg total) by mouth once daily.    LACTOBACILLUS ACIDOPH-L.BULGAR (FLORANEX) 1 MILLION CELL TAB    Take 1 tablet by mouth  once daily.    MV-MIN-FA-D3-OM3-DHA-EPA-FISH (CARDIAMIN) 200 MCG-500 UNIT-200 MG CAP    Take 1 tablet by mouth 2 (two) times daily.     PANTOPRAZOLE (PROTONIX) 40 MG TABLET    Take 1 tablet (40 mg total) by mouth once daily.    POTASSIUM CHLORIDE (MICRO-K) 10 MEQ CPSR    Take 1 capsule (10 mEq total) by mouth once daily.    RIVAROXABAN (XARELTO) 15 MG TAB    Take 1 tablet (15 mg total) by mouth 2 (two) times daily with meals. 15 mg PO BID with meals x 21 days, then 20 mg PO daily with dinner    ROSUVASTATIN (CRESTOR) 20 MG TABLET    Take 1 tablet (20 mg total) by mouth every evening.    SACUBITRIL-VALSARTAN (ENTRESTO) 24-26 MG PER TABLET    Take 1 tablet by mouth 2 (two) times daily.    SERTRALINE (ZOLOFT) 25 MG TABLET    Take 25 mg by mouth 2 (two) times daily.     SPIRONOLACTONE (ALDACTONE) 25 MG TABLET    Take 0.5 tablets (12.5 mg total) by mouth once daily.    TOPROL XL 25 MG 24 HR TABLET    Take 25 mg by mouth 2 (two) times daily.    Discontinued Medications    AMIODARONE (PACERONE) 200 MG TAB    Take 1 tablet (200 mg total) by mouth 2 (two) times daily.    LACTOBACILLUS ACIDOPHILUS (PROBIOTIC ORAL)    Take 1 tablet by mouth once daily.

## 2018-09-13 ENCOUNTER — OFFICE VISIT (OUTPATIENT)
Dept: FAMILY MEDICINE | Facility: CLINIC | Age: 78
End: 2018-09-13
Payer: MEDICARE

## 2018-09-13 VITALS
SYSTOLIC BLOOD PRESSURE: 92 MMHG | HEIGHT: 65 IN | HEART RATE: 92 BPM | WEIGHT: 170.88 LBS | DIASTOLIC BLOOD PRESSURE: 60 MMHG | OXYGEN SATURATION: 98 % | BODY MASS INDEX: 28.47 KG/M2

## 2018-09-13 DIAGNOSIS — I48.20 CHRONIC ATRIAL FIBRILLATION: ICD-10-CM

## 2018-09-13 DIAGNOSIS — B00.1 COLD SORE: ICD-10-CM

## 2018-09-13 DIAGNOSIS — I25.10 CORONARY ARTERY DISEASE, ANGINA PRESENCE UNSPECIFIED, UNSPECIFIED VESSEL OR LESION TYPE, UNSPECIFIED WHETHER NATIVE OR TRANSPLANTED HEART: Primary | ICD-10-CM

## 2018-09-13 DIAGNOSIS — I10 ESSENTIAL HYPERTENSION: ICD-10-CM

## 2018-09-13 PROCEDURE — 3074F SYST BP LT 130 MM HG: CPT | Mod: ,,, | Performed by: FAMILY MEDICINE

## 2018-09-13 PROCEDURE — 99214 OFFICE O/P EST MOD 30 MIN: CPT | Mod: S$PBB,,, | Performed by: FAMILY MEDICINE

## 2018-09-13 PROCEDURE — 99999 PR PBB SHADOW E&M-EST. PATIENT-LVL III: CPT | Mod: PBBFAC,,, | Performed by: FAMILY MEDICINE

## 2018-09-13 PROCEDURE — 1101F PT FALLS ASSESS-DOCD LE1/YR: CPT | Mod: ,,, | Performed by: FAMILY MEDICINE

## 2018-09-13 PROCEDURE — 99213 OFFICE O/P EST LOW 20 MIN: CPT | Mod: PBBFAC,PO | Performed by: FAMILY MEDICINE

## 2018-09-13 PROCEDURE — 3078F DIAST BP <80 MM HG: CPT | Mod: ,,, | Performed by: FAMILY MEDICINE

## 2018-09-13 RX ORDER — ACYCLOVIR 50 MG/G
CREAM TOPICAL
Qty: 2 G | Refills: 1 | Status: SHIPPED | OUTPATIENT
Start: 2018-09-13 | End: 2018-11-16

## 2018-09-13 RX ORDER — DIGOXIN 125 MCG
TABLET ORAL
Refills: 1 | COMMUNITY
Start: 2018-08-21 | End: 2018-10-04

## 2018-09-13 RX ORDER — ERGOCALCIFEROL 1.25 MG/1
CAPSULE ORAL
Refills: 1 | COMMUNITY
Start: 2018-09-05 | End: 2019-04-08 | Stop reason: SDUPTHER

## 2018-09-13 NOTE — PROGRESS NOTES
Subjective:       Patient ID: Sari Biswas is a 78 y.o. female.    Chief Complaint: Hypertension    HPI     Here for a f/u.    Here with     Recall that patient was hospitalized from 7/11/18 to 7/25/18 for cardiac Tamponade/Cardiogenic shock following complex PCI requiring emergent CABG x 3. Complicated post-op period involving AF, CHF exacerbation, left lateral distal thigh hematoma and weaning off ventilatory and vasopressor supports. Was discharged to snf.      Then readmitted to the [facility] medicine service for LUE DVT on 7/27/18 and discharged on 8/3/18.  During her stay, she was started on Lovenox for DVT. She was eventually transitioned to xarelto. She was placed on IV antibiotics for right groin cellulitis.     Since then, her right groin cellulitis has resolved.    Has small residual left medial distal thigh hematoma from complication of cabg. No pain.     Almost finished up with her home health phsyio and sn.  Planning to see Dr. Yonis Luu, cardiologist, next month for f/u.  Pt reports that Dr. Luu states she may need a defibrillator and cardiac rehab.  Currently, has life vest for defibrillation.     Has a cold sore on right side of lower lip x 1 day. Requesting topical med.     Review of Systems      Review of Systems   Constitutional: Negative for fever and chills.   HENT: Negative for hearing loss and neck stiffness.    Eyes: Negative for redness and itching.   Respiratory: Negative for cough and choking.    Cardiovascular: Negative for chest pain and leg swelling.  Abdomen: Negative for abdominal pain and blood in stool.   Genitourinary: Negative for dysuria and flank pain.   Musculoskeletal: Negative for back pain and gait problem.   Neurological: Negative for light-headedness and headaches.   Hematological: Negative for adenopathy.   Psychiatric/Behavioral: Negative for behavioral problems.     Objective:      Physical Exam   Constitutional: She appears well-developed.   HENT:    Head: Normocephalic and atraumatic.   Eyes: Conjunctivae are normal. Pupils are equal, round, and reactive to light.   Neck: Normal range of motion.   Cardiovascular: Normal rate and regular rhythm.   No murmur heard.  Pulmonary/Chest: Effort normal and breath sounds normal. She has no wheezes.   Lymphadenopathy:     She has no cervical adenopathy.       Assessment:       1. Coronary artery disease, angina presence unspecified, unspecified vessel or lesion type, unspecified whether native or transplanted heart    2. Chronic atrial fibrillation    3. Essential hypertension    4. Cold sore        Plan:       Coronary artery disease, angina presence unspecified, unspecified vessel or lesion type, unspecified whether native or transplanted heart  -     Ambulatory referral to Cardiology    Chronic atrial fibrillation  -     Ambulatory referral to Cardiology    Essential hypertension    Cold sore    Other orders  -     acyclovir 5% (ZOVIRAX) 5 % Crea; 5 x per day  Dispense: 2 g; Refill: 1                Plan:  Start zovirax  Refer to cardiology  Cont all other meds         Medication List           Accurate as of 9/13/18 12:25 PM. If you have any questions, ask your nurse or doctor.               START taking these medications    acyclovir 5% 5 % Crea  Commonly known as:  ZOVIRAX  5 x per day  Started by:  Tunrer Anne MD        CONTINUE taking these medications    amiodarone 200 MG Tab  Commonly known as:  PACERONE     clopidogrel 75 mg tablet  Commonly known as:  PLAVIX  Take 1 tablet (75 mg total) by mouth once daily.     cranberry 400 mg Cap     digoxin 125 mcg tablet  Commonly known as:  LANOXIN     ferrous sulfate 325 (65 FE) MG EC tablet  Take 1 tablet (325 mg total) by mouth 2 (two) times daily.     furosemide 40 MG tablet  Commonly known as:  LASIX  Take 1 tablet (40 mg total) by mouth once daily.     pantoprazole 40 MG tablet  Commonly known as:  PROTONIX  Take 1 tablet (40 mg total) by mouth once  daily.     potassium chloride 10 MEQ Cpsr  Commonly known as:  MICRO-K  TAKE 1 CAPSULE (10 MEQ TOTAL) BY MOUTH ONCE DAILY.     rivaroxaban 15 mg Tab  Commonly known as:  XARELTO  Take 1 tablet (15 mg total) by mouth 2 (two) times daily with meals. 15 mg PO BID with meals x 21 days, then 20 mg PO daily with dinner     rosuvastatin 20 MG tablet  Commonly known as:  CRESTOR  Take 1 tablet (20 mg total) by mouth every evening.     sacubitril-valsartan 24-26 mg per tablet  Commonly known as:  ENTRESTO  Take 1 tablet by mouth 2 (two) times daily.     sertraline 25 MG tablet  Commonly known as:  ZOLOFT     spironolactone 25 MG tablet  Commonly known as:  ALDACTONE  Take 0.5 tablets (12.5 mg total) by mouth once daily.     TOPROL XL 25 MG 24 hr tablet  Generic drug:  metoprolol succinate     VITAMIN D2 50,000 unit Cap  Generic drug:  ergocalciferol        STOP taking these medications    amLODIPine 5 MG tablet  Commonly known as:  NORVASC  Stopped by:  Turner Anne MD     CALCIUM+D ORAL  Stopped by:  Turner Anne MD     CARDIAMIN 200 mcg-500 unit-200 mg Cap  Generic drug:  mv-min-FA-D3-om3-dha-epa-fish  Stopped by:  Turner Anne MD     FLORANEX 1 million cell Tab  Generic drug:  Lactobacillus acidoph-L.bulgar  Stopped by:  Turner Anne MD     fluconazole 150 MG Tab  Commonly known as:  DIFLUCAN  Stopped by:  Turner Anne MD     VITAMIN D ORAL  Stopped by:  Turner Anne MD           Where to Get Your Medications      These medications were sent to [facility]/pharmacy #[id number] - [place] - [address]  [address], [place] [address]    Phone:  [phone]   · acyclovir 5% 5 % Crea

## 2018-09-14 ENCOUNTER — TELEPHONE (OUTPATIENT)
Dept: FAMILY MEDICINE | Facility: CLINIC | Age: 78
End: 2018-09-14

## 2018-09-14 NOTE — TELEPHONE ENCOUNTER
----- Message from Kalani Hidalgo sent at 9/13/2018  4:29 PM CDT -----  Contact: [phone]  Patient is requesting a call back from the nurse stated she don't want the prescription, don't worry about giving authorization.    Please call the patient upon request at phone number [phone].

## 2018-10-03 ENCOUNTER — OFFICE VISIT (OUTPATIENT)
Dept: CARDIOLOGY | Facility: CLINIC | Age: 78
End: 2018-10-03
Payer: MEDICARE

## 2018-10-03 VITALS
DIASTOLIC BLOOD PRESSURE: 61 MMHG | BODY MASS INDEX: 27.77 KG/M2 | HEART RATE: 94 BPM | HEIGHT: 65 IN | SYSTOLIC BLOOD PRESSURE: 99 MMHG | WEIGHT: 166.69 LBS

## 2018-10-03 DIAGNOSIS — I48.0 PAROXYSMAL ATRIAL FIBRILLATION: ICD-10-CM

## 2018-10-03 DIAGNOSIS — Z95.1 S/P CABG (CORONARY ARTERY BYPASS GRAFT): ICD-10-CM

## 2018-10-03 DIAGNOSIS — I25.10 CORONARY ARTERY DISEASE INVOLVING NATIVE CORONARY ARTERY OF NATIVE HEART WITHOUT ANGINA PECTORIS: ICD-10-CM

## 2018-10-03 DIAGNOSIS — E78.5 HYPERLIPIDEMIA, UNSPECIFIED HYPERLIPIDEMIA TYPE: ICD-10-CM

## 2018-10-03 DIAGNOSIS — I48.0 PAF (PAROXYSMAL ATRIAL FIBRILLATION): Primary | ICD-10-CM

## 2018-10-03 PROCEDURE — 1101F PT FALLS ASSESS-DOCD LE1/YR: CPT | Mod: ,,, | Performed by: INTERNAL MEDICINE

## 2018-10-03 PROCEDURE — 3074F SYST BP LT 130 MM HG: CPT | Mod: ,,, | Performed by: INTERNAL MEDICINE

## 2018-10-03 PROCEDURE — 99204 OFFICE O/P NEW MOD 45 MIN: CPT | Mod: S$PBB,,, | Performed by: INTERNAL MEDICINE

## 2018-10-03 PROCEDURE — 3078F DIAST BP <80 MM HG: CPT | Mod: ,,, | Performed by: INTERNAL MEDICINE

## 2018-10-03 PROCEDURE — 99213 OFFICE O/P EST LOW 20 MIN: CPT | Mod: PBBFAC,PO | Performed by: INTERNAL MEDICINE

## 2018-10-03 PROCEDURE — 99999 PR PBB SHADOW E&M-EST. PATIENT-LVL III: CPT | Mod: PBBFAC,,, | Performed by: INTERNAL MEDICINE

## 2018-10-03 NOTE — PROGRESS NOTES
Subjective:    Patient ID:  Sari Biswas is a 78 y.o. female who presents for evaluation of CAD    HPI  She comes for second opinion  Admitted to [facility] back in July.  Had LHC, atherectomy of LAD complicated by perforation, requiring emergency CABG (x3)  Had post-op atrial fibrillation, life-vest in place  Amiodarone, lanoxin stopped due to side effects  Currently FC II-III, no chest pain  SOB with moderate activity, not walking much    Review of Systems   Constitution: Negative for decreased appetite, weakness, malaise/fatigue, weight gain and weight loss.   Cardiovascular: Negative for chest pain, dyspnea on exertion, leg swelling, palpitations and syncope.   Respiratory: Positive for shortness of breath. Negative for cough.    Gastrointestinal: Negative.    All other systems reviewed and are negative.       Objective:      Physical Exam   Constitutional: She is oriented to person, place, and time. She appears well-developed and well-nourished.   HENT:   Head: Normocephalic.   Eyes: Pupils are equal, round, and reactive to light.   Neck: Normal range of motion. Neck supple. No JVD present. Carotid bruit is not present. No thyromegaly present.   Cardiovascular: Normal rate, normal heart sounds, intact distal pulses and normal pulses. An irregularly irregular rhythm present. PMI is not displaced. Exam reveals no gallop.   No murmur heard.  Pulmonary/Chest: Effort normal and breath sounds normal.   Abdominal: Soft. Normal appearance. She exhibits no mass. There is no hepatosplenomegaly. There is no tenderness.   Musculoskeletal: Normal range of motion. She exhibits no edema.   Neurological: She is alert and oriented to person, place, and time. She has normal strength and normal reflexes. No sensory deficit.   Skin: Skin is warm and intact.   Psychiatric: She has a normal mood and affect.   Nursing note and vitals reviewed.        Assessment:       1. PAF (paroxysmal atrial fibrillation)    2. S/P CABG (coronary  artery bypass graft)    3. Coronary artery disease involving native coronary artery of native heart without angina pectoris    4. Hyperlipidemia, unspecified hyperlipidemia type    5. Paroxysmal atrial fibrillation         Plan:   DCCV next week  Decrease plavix to qod  Continue all other cardiac medications  Check echocardiogram after dccv  4 week f/u

## 2018-10-04 ENCOUNTER — TELEPHONE (OUTPATIENT)
Dept: CARDIOLOGY | Facility: CLINIC | Age: 78
End: 2018-10-04

## 2018-10-04 DIAGNOSIS — I48.0 PAROXYSMAL ATRIAL FIBRILLATION: Primary | ICD-10-CM

## 2018-10-04 NOTE — TELEPHONE ENCOUNTER
Spoke to pt. Informed her of Cardioversion procedure.   Arrive for your procedure at:  [facility] on Monday October 8th. At 9 a.m. ([facility] will call one-two days to give final arrival time) For your 10 a.m. Procedure.       FASTING:  You MAY NO([facility] will call one-two days before your procedure to give the final arrival time)T have anything to eat or drink AFTER MIDNIGHT.   ? MEDICATIONS:  You may take your regular morning medications with a small sip of water.  Hold or adjust the following:   Fluid pills.   Diabetes medications.   Continue: Coumadin, Plavix, Effient, & Aspirin    Pt. Verbalized understanding.

## 2018-10-05 ENCOUNTER — TELEPHONE (OUTPATIENT)
Dept: CARDIOLOGY | Facility: CLINIC | Age: 78
End: 2018-10-05

## 2018-10-05 NOTE — TELEPHONE ENCOUNTER
----- Message from Brain Hernandez sent at 10/5/2018 12:15 PM CDT -----  Contact: pt  The Pt is requesting a call back regarding the procedure on monday. Please call Pt to advise.     Call Back#: [phone]  Thanks

## 2018-10-08 ENCOUNTER — TELEPHONE (OUTPATIENT)
Dept: CARDIOLOGY | Facility: CLINIC | Age: 78
End: 2018-10-08

## 2018-10-08 DIAGNOSIS — I25.10 CORONARY ARTERY DISEASE, ANGINA PRESENCE UNSPECIFIED, UNSPECIFIED VESSEL OR LESION TYPE, UNSPECIFIED WHETHER NATIVE OR TRANSPLANTED HEART: Primary | ICD-10-CM

## 2018-10-08 DIAGNOSIS — I50.9 CONGESTIVE HEART FAILURE, UNSPECIFIED HF CHRONICITY, UNSPECIFIED HEART FAILURE TYPE: ICD-10-CM

## 2018-10-10 ENCOUNTER — TELEPHONE (OUTPATIENT)
Dept: CARDIOLOGY | Facility: CLINIC | Age: 78
End: 2018-10-10

## 2018-10-10 NOTE — TELEPHONE ENCOUNTER
SPOKE WITH PATIENT' HOME HEALTH NURSE-  REPORTS A 2 LBS WEIGHT GAIN IN 24 HOURS  BIBASILAR CRACKLES NO ACUTE DISTRESS NOTED. MILD SOB ON EXERTION. REPORTS HAVING MAC AND CHEESE FROM BOX LAST NIGHT.  SHE TOOK EXTRA LASIX 20MG  TODAY TO EQUAL TOTAL OF 40MG TODAY. PLEASE ADVISE

## 2018-10-10 NOTE — TELEPHONE ENCOUNTER
----- Message from Rachell Martinez sent at 10/10/2018 11:38 AM CDT -----  Contact: Bryanna yanez/ [facility]  Type: Needs Medical Advice    Who Called:  Bryanna yanez/ [facility]  Symptoms (please be specific):  2 1/2 pd weight gain in 24 hrs and she is short of breath  How long has patient had these symptoms:  na  Pharmacy name and phone #:  na  Best Call Back Number: Bryanna at   Additional Information: Calling to speak with the Nurse about the pt's symptoms. Please advise. Call to pod. Call connected to pod. Warm transferred

## 2018-10-10 NOTE — TELEPHONE ENCOUNTER
Spoke with the patient she verbalized understanding regarding taking an extra lasix she took an extra on today and is feeling better. She will call back if symptoms come back or worsen

## 2018-10-15 ENCOUNTER — TELEPHONE (OUTPATIENT)
Dept: CARDIOLOGY | Facility: CLINIC | Age: 78
End: 2018-10-15

## 2018-10-15 NOTE — TELEPHONE ENCOUNTER
----- Message from Charity Julian sent at 10/15/2018  2:30 PM CDT -----  Contact: Patient  Type: Needs Medical Advice    Who Called: Patient  Symptoms (please be specific):  alexa  How long has patient had these symptoms:  alexa  Pharmacy name and phone #:alexa  Best Call Back Number: [phone]  Additional Information:Patient states that she has questions concerning her medication and is requesting a call back. Thank you!

## 2018-10-16 ENCOUNTER — PATIENT MESSAGE (OUTPATIENT)
Dept: ADMINISTRATIVE | Facility: OTHER | Age: 78
End: 2018-10-16

## 2018-10-16 DIAGNOSIS — I48.91 ATRIAL FIBRILLATION, UNSPECIFIED TYPE: Primary | ICD-10-CM

## 2018-10-16 RX ORDER — AMIODARONE HYDROCHLORIDE 200 MG/1
200 TABLET ORAL DAILY
Qty: 30 TABLET | Refills: 1 | Status: SHIPPED | OUTPATIENT
Start: 2018-10-16 | End: 2018-10-22

## 2018-10-16 RX ORDER — AMIODARONE HYDROCHLORIDE 200 MG/1
200 TABLET ORAL DAILY
COMMUNITY
End: 2018-10-16 | Stop reason: SDUPTHER

## 2018-10-16 NOTE — TELEPHONE ENCOUNTER
----- Message from Simone Nolasco sent at 10/16/2018  2:23 PM CDT -----  Contact: self   Patient need a new rx on amiodarone 200mg  please send to [facility], any questions please call back at [phone] (home)     [facility][id number] - [place] - [address]  [address]  [place] [address]  Phone: [phone] Fax: [phone]

## 2018-10-22 ENCOUNTER — CLINICAL SUPPORT (OUTPATIENT)
Dept: CARDIOLOGY | Facility: CLINIC | Age: 78
End: 2018-10-22
Attending: INTERNAL MEDICINE
Payer: MEDICARE

## 2018-10-22 ENCOUNTER — INITIAL CONSULT (OUTPATIENT)
Dept: CARDIOLOGY | Facility: CLINIC | Age: 78
End: 2018-10-22
Payer: MEDICARE

## 2018-10-22 VITALS
SYSTOLIC BLOOD PRESSURE: 118 MMHG | DIASTOLIC BLOOD PRESSURE: 62 MMHG | HEART RATE: 120 BPM | WEIGHT: 164 LBS | BODY MASS INDEX: 27.32 KG/M2 | HEIGHT: 65 IN

## 2018-10-22 VITALS
SYSTOLIC BLOOD PRESSURE: 105 MMHG | BODY MASS INDEX: 27.29 KG/M2 | WEIGHT: 163.81 LBS | DIASTOLIC BLOOD PRESSURE: 64 MMHG | HEIGHT: 65 IN | HEART RATE: 127 BPM

## 2018-10-22 DIAGNOSIS — I25.10 CORONARY ARTERY DISEASE, ANGINA PRESENCE UNSPECIFIED, UNSPECIFIED VESSEL OR LESION TYPE, UNSPECIFIED WHETHER NATIVE OR TRANSPLANTED HEART: ICD-10-CM

## 2018-10-22 DIAGNOSIS — I48.3 TYPICAL ATRIAL FLUTTER: ICD-10-CM

## 2018-10-22 DIAGNOSIS — I48.91 ATRIAL FIBRILLATION, UNSPECIFIED TYPE: Primary | ICD-10-CM

## 2018-10-22 DIAGNOSIS — I48.91 ATRIAL FIBRILLATION, UNSPECIFIED TYPE: ICD-10-CM

## 2018-10-22 DIAGNOSIS — I25.5 ISCHEMIC DILATED CARDIOMYOPATHY: ICD-10-CM

## 2018-10-22 DIAGNOSIS — I48.0 PAROXYSMAL ATRIAL FIBRILLATION: Primary | ICD-10-CM

## 2018-10-22 DIAGNOSIS — I25.10 CORONARY ARTERY DISEASE INVOLVING NATIVE CORONARY ARTERY OF NATIVE HEART WITHOUT ANGINA PECTORIS: ICD-10-CM

## 2018-10-22 DIAGNOSIS — I42.0 ISCHEMIC DILATED CARDIOMYOPATHY: ICD-10-CM

## 2018-10-22 DIAGNOSIS — I50.43 ACUTE ON CHRONIC COMBINED SYSTOLIC (CONGESTIVE) AND DIASTOLIC (CONGESTIVE) HEART FAILURE: ICD-10-CM

## 2018-10-22 DIAGNOSIS — I50.9 CONGESTIVE HEART FAILURE, UNSPECIFIED HF CHRONICITY, UNSPECIFIED HEART FAILURE TYPE: ICD-10-CM

## 2018-10-22 DIAGNOSIS — Z95.1 S/P CABG (CORONARY ARTERY BYPASS GRAFT): ICD-10-CM

## 2018-10-22 DIAGNOSIS — I25.110 CORONARY ARTERY DISEASE INVOLVING NATIVE CORONARY ARTERY OF NATIVE HEART WITH UNSTABLE ANGINA PECTORIS: ICD-10-CM

## 2018-10-22 PROCEDURE — 3074F SYST BP LT 130 MM HG: CPT | Mod: ,,, | Performed by: INTERNAL MEDICINE

## 2018-10-22 PROCEDURE — 99204 OFFICE O/P NEW MOD 45 MIN: CPT | Mod: S$PBB,,, | Performed by: INTERNAL MEDICINE

## 2018-10-22 PROCEDURE — 93010 ELECTROCARDIOGRAM REPORT: CPT | Mod: S$PBB,,, | Performed by: INTERNAL MEDICINE

## 2018-10-22 PROCEDURE — 99999 PR PBB SHADOW E&M-EST. PATIENT-LVL III: CPT | Mod: PBBFAC,,, | Performed by: INTERNAL MEDICINE

## 2018-10-22 PROCEDURE — 93306 TTE W/DOPPLER COMPLETE: CPT | Mod: PBBFAC,PO | Performed by: INTERNAL MEDICINE

## 2018-10-22 PROCEDURE — 3078F DIAST BP <80 MM HG: CPT | Mod: ,,, | Performed by: INTERNAL MEDICINE

## 2018-10-22 PROCEDURE — 93005 ELECTROCARDIOGRAM TRACING: CPT | Mod: PBBFAC,PO | Performed by: INTERNAL MEDICINE

## 2018-10-22 PROCEDURE — 99999 PR PBB SHADOW E&M-EST. PATIENT-LVL II: CPT | Mod: PBBFAC,,,

## 2018-10-22 PROCEDURE — 99213 OFFICE O/P EST LOW 20 MIN: CPT | Mod: PBBFAC,PO,25 | Performed by: INTERNAL MEDICINE

## 2018-10-22 PROCEDURE — 99212 OFFICE O/P EST SF 10 MIN: CPT | Mod: PBBFAC,27,PO

## 2018-10-22 PROCEDURE — 1101F PT FALLS ASSESS-DOCD LE1/YR: CPT | Mod: ,,, | Performed by: INTERNAL MEDICINE

## 2018-10-22 NOTE — PROGRESS NOTES
Subjective:    Patient ID:  Sari Biswas is a 78 y.o. female who presents for evaluation of Atrial Flutter (Ref by Dr. Singleton)      HPI 77 yo female with atrial fibrillation, atrial flutter, CAD s/p CABG, ischemic cardiomyopathy, CHF.  Primary cardiologist at this time is Dr. Singleton.  Underwent high risk PCI by Dr. Colon 7/11/18 complicated by LAD perforation and tamponade.  7/11/18 Emergent CABG x 3 + sewing of LAD.  Subsequently had cardiogenic shock with multiple pressors.  Was in atrial fibrillation and atrial flutter during that time.  Placed on amiodarone.    EF was 35% 7/17.  Placed on Lifevest.  Amiodarone discontinued.  Seen by Dr. Singleton.  Noted to be in atrial flutter with cycle length of 270 msec, c/w isthmus dependent atrial flutter.  DCCV 10/8/18.  Amiodarone 200 mg daily resumed at that time.  Echo 10/0818 EF 25%  She reports significant dyspnea with any exertion.  She reports significant improvement for 3 days after her DCCV.  Notes indicate SVT prior to her surgery.  She reports she had intermittent palpitations that resolved.  Of note, had right femoral hematoma requiring wound care.    On Xarelto at dinner.    Review of Systems   Constitution: Negative. Negative for weakness and malaise/fatigue.   Cardiovascular: Positive for dyspnea on exertion. Negative for chest pain, irregular heartbeat, leg swelling, near-syncope, orthopnea, palpitations, paroxysmal nocturnal dyspnea and syncope.   Respiratory: Positive for shortness of breath. Negative for cough.    Neurological: Negative for dizziness and light-headedness.   All other systems reviewed and are negative.       Objective:    Physical Exam   Constitutional: She is oriented to person, place, and time. She appears well-developed and well-nourished.   Eyes: Conjunctivae are normal. No scleral icterus.   Neck: No JVD present. No tracheal deviation present.   Cardiovascular: Regular rhythm. Tachycardia present. PMI is not displaced.    Pulmonary/Chest: Effort normal and breath sounds normal. No respiratory distress.   Abdominal: Soft. There is no hepatosplenomegaly. There is no tenderness.   Musculoskeletal: She exhibits no edema or tenderness.   Neurological: She is alert and oriented to person, place, and time.   Skin: Skin is warm and dry. No rash noted.   Psychiatric: She has a normal mood and affect. Her behavior is normal.         Assessment:       1. Paroxysmal atrial fibrillation    2. Typical atrial flutter    3. Acute on chronic combined systolic (congestive) and diastolic (congestive) heart failure    4. Coronary artery disease involving native coronary artery of native heart with unstable angina pectoris    5. S/P CABG (coronary artery bypass graft)    6. Coronary artery disease involving native coronary artery of native heart without angina pectoris    7. Ischemic dilated cardiomyopathy         Plan:           Recurrent atrial flutter with rapid ventricular response.  Has cardiomyopathy.  Recommend RFA.  Anticipate left femoral approach.  Repeat echo 6-8 weeks later.  LOUANN day of procedure.  Take Xarelto evening prior to procedure.  Anesthesia (prefer MAC).  Discontinue amiodarone.

## 2018-10-22 NOTE — LETTER
October 22, 2018      Rodo Singleton MD  [address] [facility] [address] [place] [address]           [facility]  [address] [facility] [address] [place] [address]  Phone: [phone]          Patient: Sari Biswas   MR Number: [id number]   YOB: 1940   Date of Visit: 10/22/2018       Dear Dr. Rodo Singleton:    Thank you for referring Sari Biswas to me for evaluation. Attached you will find relevant portions of my assessment and plan of care.    If you have questions, please do not hesitate to call me. I look forward to following Sari Biswas along with you.    Sincerely,    Bonilla Fitzgerald MD    Enclosure  CC:  No Recipients    If you would like to receive this communication electronically, please contact [email or web address] or [phone] to request more information on [facility] access.    For providers and/or their staff who would like to refer a patient to [facility], please contact us through our one-stop-shop provider referral line, [facility], at [phone].    If you feel you have received this communication in error or would no longer like to receive these types of communications, please e-mail [email or web address]

## 2018-10-23 ENCOUNTER — TELEPHONE (OUTPATIENT)
Dept: ELECTROPHYSIOLOGY | Facility: CLINIC | Age: 78
End: 2018-10-23

## 2018-10-23 NOTE — TELEPHONE ENCOUNTER
Spoke with patient and advised that we are looking at 10/31 or 11/1. I am waiting for Dr Fitzgerald to get back with me to see which day is better for him. Advised I will call her as soon as I get an answer from him. She verbalizes understanding.

## 2018-10-23 NOTE — TELEPHONE ENCOUNTER
----- Message from Nena Lopez MA sent at 10/23/2018  3:14 PM CDT -----  Contact: self      ----- Message -----  From: Vangie Zamudio  Sent: 10/23/2018   3:05 PM  To: [facility] Staff    Patient is calling regarding a oblasion procedure she is trying to schedule. Please call andreia at [phone]. Thanks!

## 2018-10-24 ENCOUNTER — TELEPHONE (OUTPATIENT)
Dept: ELECTROPHYSIOLOGY | Facility: CLINIC | Age: 78
End: 2018-10-24

## 2018-10-24 ENCOUNTER — OFFICE VISIT (OUTPATIENT)
Dept: CARDIOLOGY | Facility: CLINIC | Age: 78
End: 2018-10-24
Payer: MEDICARE

## 2018-10-24 VITALS
SYSTOLIC BLOOD PRESSURE: 106 MMHG | HEART RATE: 118 BPM | WEIGHT: 162.69 LBS | HEIGHT: 65 IN | DIASTOLIC BLOOD PRESSURE: 69 MMHG | BODY MASS INDEX: 27.1 KG/M2

## 2018-10-24 DIAGNOSIS — I48.3 TYPICAL ATRIAL FLUTTER: Primary | ICD-10-CM

## 2018-10-24 DIAGNOSIS — I42.0 ISCHEMIC DILATED CARDIOMYOPATHY: ICD-10-CM

## 2018-10-24 DIAGNOSIS — Z95.1 S/P CABG (CORONARY ARTERY BYPASS GRAFT): Primary | ICD-10-CM

## 2018-10-24 DIAGNOSIS — I48.0 PAROXYSMAL ATRIAL FIBRILLATION: ICD-10-CM

## 2018-10-24 DIAGNOSIS — I50.43 ACUTE ON CHRONIC COMBINED SYSTOLIC (CONGESTIVE) AND DIASTOLIC (CONGESTIVE) HEART FAILURE: ICD-10-CM

## 2018-10-24 DIAGNOSIS — I48.91 ATRIAL FIBRILLATION: Primary | ICD-10-CM

## 2018-10-24 DIAGNOSIS — I48.3 TYPICAL ATRIAL FLUTTER: ICD-10-CM

## 2018-10-24 DIAGNOSIS — I25.5 ISCHEMIC DILATED CARDIOMYOPATHY: ICD-10-CM

## 2018-10-24 LAB
ASCENDING AORTA: 2.65 CM
AV MEAN GRADIENT: 4.16 MMHG
AV PEAK GRADIENT: 6.64 MMHG
AV VALVE AREA: 2.41 CM2
BSA FOR ECHO PROCEDURE: 1.85 M2
CV ECHO LV RWT: 0.38 CM
DOP CALC AO PEAK VEL: 1.29 M/S
DOP CALC AO VTI: 16.41 CM
DOP CALC LVOT AREA: 3.36 CM2
DOP CALC LVOT DIAMETER: 2.07 CM
DOP CALC LVOT STROKE VOLUME: 39.59 CM3
DOP CALCLVOT PEAK VEL VTI: 11.77 CM
ECHO LV POSTERIOR WALL: 1.01 CM (ref 0.6–1.1)
FRACTIONAL SHORTENING: 12 % (ref 28–44)
INTERVENTRICULAR SEPTUM: 0.96 CM (ref 0.6–1.1)
IVRT: 0.05 MSEC
LA MAJOR: 5.33 CM
LA MINOR: 4.41 CM
LA WIDTH: 4.69 CM
LEFT ATRIUM SIZE: 4.15 CM
LEFT ATRIUM VOLUME INDEX: 43.2 ML/M2
LEFT ATRIUM VOLUME: 79.85 CM3
LEFT INTERNAL DIMENSION IN SYSTOLE: 4.61 CM (ref 2.1–4)
LEFT VENTRICLE DIASTOLIC VOLUME INDEX: 70.41 ML/M2
LEFT VENTRICLE DIASTOLIC VOLUME: 130.25 ML
LEFT VENTRICLE MASS INDEX: 103.2 G/M2
LEFT VENTRICLE SYSTOLIC VOLUME INDEX: 52.8 ML/M2
LEFT VENTRICLE SYSTOLIC VOLUME: 97.64 ML
LEFT VENTRICULAR INTERNAL DIMENSION IN DIASTOLE: 5.21 CM (ref 3.5–6)
LEFT VENTRICULAR MASS: 190.9 G
PISA TR MAX VEL: 3.11 M/S
RA MAJOR: 5.44 CM
RA PRESSURE: 8 MMHG
RA WIDTH: 4.75 CM
RETIRED EF AND QEF - SEE NOTES: 25.04 %
RIGHT VENTRICULAR END-DIASTOLIC DIMENSION: 4.18 CM
SINUS: 3.22 CM
STJ: 2.66 CM
TR MAX PG: 38.69 MMHG
TRICUSPID ANNULAR PLANE SYSTOLIC EXCURSION: 0.78 CM
TV REST PULMONARY ARTERY PRESSURE: 46.69 MMHG

## 2018-10-24 PROCEDURE — 99214 OFFICE O/P EST MOD 30 MIN: CPT | Mod: S$PBB,,, | Performed by: INTERNAL MEDICINE

## 2018-10-24 PROCEDURE — 3074F SYST BP LT 130 MM HG: CPT | Mod: ,,, | Performed by: INTERNAL MEDICINE

## 2018-10-24 PROCEDURE — 3078F DIAST BP <80 MM HG: CPT | Mod: ,,, | Performed by: INTERNAL MEDICINE

## 2018-10-24 PROCEDURE — 1101F PT FALLS ASSESS-DOCD LE1/YR: CPT | Mod: ,,, | Performed by: INTERNAL MEDICINE

## 2018-10-24 PROCEDURE — 99213 OFFICE O/P EST LOW 20 MIN: CPT | Mod: PBBFAC,PO | Performed by: INTERNAL MEDICINE

## 2018-10-24 PROCEDURE — 99999 PR PBB SHADOW E&M-EST. PATIENT-LVL III: CPT | Mod: PBBFAC,,, | Performed by: INTERNAL MEDICINE

## 2018-10-24 NOTE — PROGRESS NOTES
Subjective:    Patient ID:  Sari Biswas is a 78 y.o. female who presents for follow-up of chf    HPI  She comes with persistent shortness of breath with minimal activity, orthopnea, PND  Getting flutter ablation this friday      Review of Systems   Constitution: Negative for decreased appetite, weakness, malaise/fatigue, weight gain and weight loss.   Cardiovascular: Positive for dyspnea on exertion. Negative for chest pain, leg swelling, palpitations and syncope.   Respiratory: Positive for shortness of breath. Negative for cough.    Gastrointestinal: Negative.    All other systems reviewed and are negative.       Objective:      Physical Exam   Constitutional: She is oriented to person, place, and time. She appears well-developed and well-nourished.   HENT:   Head: Normocephalic.   Eyes: Pupils are equal, round, and reactive to light.   Neck: Normal range of motion. Neck supple. No JVD present. Carotid bruit is not present. No thyromegaly present.   Cardiovascular: Regular rhythm, normal heart sounds, intact distal pulses and normal pulses. Tachycardia present. PMI is not displaced. Exam reveals no gallop.   No murmur heard.  Pulmonary/Chest: Effort normal and breath sounds normal.   Abdominal: Soft. Normal appearance. She exhibits no mass. There is no hepatosplenomegaly. There is no tenderness.   Musculoskeletal: Normal range of motion. She exhibits no edema.   Neurological: She is alert and oriented to person, place, and time. She has normal strength and normal reflexes. No sensory deficit.   Skin: Skin is warm and intact.   Psychiatric: She has a normal mood and affect.   Nursing note and vitals reviewed.        Assessment:       1. S/P CABG (coronary artery bypass graft)    2. Acute on chronic combined systolic (congestive) and diastolic (congestive) heart failure    3. Paroxysmal atrial fibrillation    4. Typical atrial flutter    5. Ischemic dilated cardiomyopathy         Plan:     3 week f/u

## 2018-10-24 NOTE — LETTER
October 24, 2018      Truner Anne MD  [address] [facility] [address] [place] [address]           [facility] Cardiology  [address] [facility] [address] [place] [address]  Phone: [phone]          Patient: Sari Biswas   MR Number: [id number]   YOB: 1940   Date of Visit: 10/24/2018       Dear Dr. Turner Anne:    Thank you for referring Sari Biswas to me for evaluation. Attached you will find relevant portions of my assessment and plan of care.    If you have questions, please do not hesitate to call me. I look forward to following Sari Biswas along with you.    Sincerely,    Rodo Singleton MD    Enclosure  CC:  No Recipients    If you would like to receive this communication electronically, please contact [email or web address] or [phone] to request more information on [facility] access.    For providers and/or their staff who would like to refer a patient to [facility], please contact us through our one-stop-shop provider referral line, [facility], at [phone].    If you feel you have received this communication in error or would no longer like to receive these types of communications, please e-mail [email or web address]

## 2018-10-24 NOTE — PROGRESS NOTES
ABLATION EDUCATION CHECKLIST        DAY OF PROCEDURE:    10/26/18 @ 10 AM  Report to Cardiology Waiting Room on 3rd floor of the Hospital    · Do not eat or drink anything after: 12 mn on the night before your procedure  · Please do not wear makeup (especially mascara) when arriving for your procedure    Medications:   · Continue Xarelto the evening prior to procedure  · You may take your other usual morning medications with a sip of water      Directions to the Cardiology Waiting Room  If you park in the Parking Garage:  Take elevators to the 2nd floor  Walk up ramp and turn right by Gold Elevators  Take elevator to the 3rd floor  Upon exiting the elevator, turn away from the clinic areas  Walk long betancur around to front of hospital to area with windows overlooking [facility]  Check in at Reception Desk  OR  If family is dropping you off:  Have them drop you off at the front of the Hospital  (Near the ER, where all the flags are hung).  Take the E elevators to the 3rd floor.  Check in at the Reception Desk in the waiting room.    · You will be spending the night after your procedure.  · You will need someone to drive you home the day after your procedure.  · Your pain during your procedure will be managed by the anesthesia team.     Any need to reschedule or cancel procedures, or any questions regarding your procedures should be addressed directly with the Arrhythmia Department Nurses at the following phone number: [phone]

## 2018-10-24 NOTE — TELEPHONE ENCOUNTER
Discussed case with Dr Fitzgerald. He wants to schedule her ablation for this Friday, 10/26/2018. Spoke with patient and confirmed that she needs to fast after 12mn Thursday and be here (3rd floor SSCU) for 10am Friday. She will take her Xarelto, as scheduled, Thursday evening. She verbalized understanding and requested a copy of instructions be emailed to her at [email or web address]. She voiced no other questions or concerns.

## 2018-10-25 ENCOUNTER — TELEPHONE (OUTPATIENT)
Dept: ELECTROPHYSIOLOGY | Facility: CLINIC | Age: 78
End: 2018-10-25

## 2018-10-25 NOTE — TELEPHONE ENCOUNTER
Called pt to review pre op instructions. Reminded pt to take xarelto this evening per MD. Pt verbalized understanding. Pt denies any questions/concerns at this time.

## 2018-10-26 ENCOUNTER — ANESTHESIA EVENT (OUTPATIENT)
Dept: MEDSURG UNIT | Facility: HOSPITAL | Age: 78
End: 2018-10-26
Payer: MEDICARE

## 2018-10-26 ENCOUNTER — HOSPITAL ENCOUNTER (OUTPATIENT)
Dept: CARDIOLOGY | Facility: CLINIC | Age: 78
Discharge: HOME | End: 2018-10-26
Attending: INTERNAL MEDICINE | Admitting: INTERNAL MEDICINE
Payer: MEDICARE

## 2018-10-26 ENCOUNTER — ANESTHESIA (OUTPATIENT)
Dept: MEDSURG UNIT | Facility: HOSPITAL | Age: 78
End: 2018-10-26
Payer: MEDICARE

## 2018-10-26 ENCOUNTER — HOSPITAL ENCOUNTER (OUTPATIENT)
Facility: HOSPITAL | Age: 78
Discharge: HOME | End: 2018-10-27
Attending: INTERNAL MEDICINE | Admitting: INTERNAL MEDICINE
Payer: MEDICARE

## 2018-10-26 DIAGNOSIS — Q82.8 OTHER SPECIFIED CONGENITAL MALFORMATIONS OF SKIN: ICD-10-CM

## 2018-10-26 DIAGNOSIS — I48.92 ATRIAL FLUTTER: ICD-10-CM

## 2018-10-26 DIAGNOSIS — I48.0 PAF (PAROXYSMAL ATRIAL FIBRILLATION): ICD-10-CM

## 2018-10-26 DIAGNOSIS — I48.3 TYPICAL ATRIAL FLUTTER: Primary | ICD-10-CM

## 2018-10-26 LAB
ANION GAP SERPL CALC-SCNC: 11 MMOL/L
AORTIC ATHEROMA: YES
AORTIC VALVE REGURGITATION: ABNORMAL
APTT BLDCRRT: 26.5 SEC
BASOPHILS # BLD AUTO: 0.04 K/UL
BASOPHILS NFR BLD: 0.5 %
BUN SERPL-MCNC: 17 MG/DL
CALCIUM SERPL-MCNC: 9.5 MG/DL
CHLORIDE SERPL-SCNC: 103 MMOL/L
CO2 SERPL-SCNC: 28 MMOL/L
CREAT SERPL-MCNC: 0.9 MG/DL
DIFFERENTIAL METHOD: ABNORMAL
EOSINOPHIL # BLD AUTO: 0.3 K/UL
EOSINOPHIL NFR BLD: 4.4 %
ERYTHROCYTE [DISTWIDTH] IN BLOOD BY AUTOMATED COUNT: 15.7 %
EST. GFR  (AFRICAN AMERICAN): >60 ML/MIN/1.73 M^2
EST. GFR  (NON AFRICAN AMERICAN): >60 ML/MIN/1.73 M^2
GLOBAL PERICARDIAL EFFUSION: ABNORMAL
GLOBAL PERICARDIAL EFFUSION: ABNORMAL
GLUCOSE SERPL-MCNC: 121 MG/DL
HCT VFR BLD AUTO: 37.7 %
HGB BLD-MCNC: 11.8 G/DL
IMM GRANULOCYTES # BLD AUTO: 0.03 K/UL
IMM GRANULOCYTES NFR BLD AUTO: 0.4 %
INR PPP: 1.3
LYMPHOCYTES # BLD AUTO: 0.5 K/UL
LYMPHOCYTES NFR BLD: 6.3 %
MCH RBC QN AUTO: 29.6 PG
MCHC RBC AUTO-ENTMCNC: 31.3 G/DL
MCV RBC AUTO: 95 FL
MITRAL VALVE MOBILITY: NORMAL
MITRAL VALVE MOBILITY: NORMAL
MITRAL VALVE REGURGITATION: ABNORMAL
MONOCYTES # BLD AUTO: 0.6 K/UL
MONOCYTES NFR BLD: 8.3 %
NEUTROPHILS # BLD AUTO: 6 K/UL
NEUTROPHILS NFR BLD: 80.1 %
NRBC BLD-RTO: 0 /100 WBC
PLATELET # BLD AUTO: 308 K/UL
PMV BLD AUTO: 10.4 FL
POTASSIUM SERPL-SCNC: 3.8 MMOL/L
PROTHROMBIN TIME: 13.3 SEC
RBC # BLD AUTO: 3.98 M/UL
RETIRED EF AND QEF - SEE NOTES: 25 (ref 55–65)
SODIUM SERPL-SCNC: 142 MMOL/L
TRICUSPID VALVE REGURGITATION: ABNORMAL
WBC # BLD AUTO: 7.5 K/UL

## 2018-10-26 PROCEDURE — 93653 COMPRE EP EVAL TX SVT: CPT | Mod: ,,, | Performed by: INTERNAL MEDICINE

## 2018-10-26 PROCEDURE — 63600175 PHARM REV CODE 636 W HCPCS

## 2018-10-26 PROCEDURE — 25000003 PHARM REV CODE 250

## 2018-10-26 PROCEDURE — 25000003 PHARM REV CODE 250: Performed by: INTERNAL MEDICINE

## 2018-10-26 PROCEDURE — 85610 PROTHROMBIN TIME: CPT

## 2018-10-26 PROCEDURE — 25000003 PHARM REV CODE 250: Performed by: NURSE ANESTHETIST, CERTIFIED REGISTERED

## 2018-10-26 PROCEDURE — D9220A PRA ANESTHESIA: Mod: CRNA,,, | Performed by: NURSE ANESTHETIST, CERTIFIED REGISTERED

## 2018-10-26 PROCEDURE — C1733 CATH, EP, OTHR THAN COOL-TIP: HCPCS

## 2018-10-26 PROCEDURE — 85730 THROMBOPLASTIN TIME PARTIAL: CPT

## 2018-10-26 PROCEDURE — 85025 COMPLETE CBC W/AUTO DIFF WBC: CPT

## 2018-10-26 PROCEDURE — 27100006 EP LAB PROCEDURE

## 2018-10-26 PROCEDURE — 93355 ECHO TRANSESOPHAGEAL (TEE): CPT | Mod: ,,, | Performed by: INTERNAL MEDICINE

## 2018-10-26 PROCEDURE — 93355 ECHO TRANSESOPHAGEAL (TEE): CPT | Mod: 59

## 2018-10-26 PROCEDURE — 93613 INTRACARDIAC EPHYS 3D MAPG: CPT | Mod: ,,, | Performed by: INTERNAL MEDICINE

## 2018-10-26 PROCEDURE — 93307 TTE W/O DOPPLER COMPLETE: CPT

## 2018-10-26 PROCEDURE — 93010 ELECTROCARDIOGRAM REPORT: CPT | Mod: ,,, | Performed by: INTERNAL MEDICINE

## 2018-10-26 PROCEDURE — 93005 ELECTROCARDIOGRAM TRACING: CPT

## 2018-10-26 PROCEDURE — 93621 COMP EP EVL L PAC&REC C SINS: CPT | Mod: 26,,, | Performed by: INTERNAL MEDICINE

## 2018-10-26 PROCEDURE — 80048 BASIC METABOLIC PNL TOTAL CA: CPT

## 2018-10-26 PROCEDURE — 37000008 HC ANESTHESIA 1ST 15 MINUTES: Performed by: INTERNAL MEDICINE

## 2018-10-26 PROCEDURE — 93307 TTE W/O DOPPLER COMPLETE: CPT | Mod: 26,,, | Performed by: INTERNAL MEDICINE

## 2018-10-26 PROCEDURE — A4216 STERILE WATER/SALINE, 10 ML: HCPCS | Performed by: NURSE ANESTHETIST, CERTIFIED REGISTERED

## 2018-10-26 PROCEDURE — 63600175 PHARM REV CODE 636 W HCPCS: Performed by: NURSE ANESTHETIST, CERTIFIED REGISTERED

## 2018-10-26 PROCEDURE — D9220A PRA ANESTHESIA: Mod: ANES,,, | Performed by: ANESTHESIOLOGY

## 2018-10-26 PROCEDURE — 37000009 HC ANESTHESIA EA ADD 15 MINS: Performed by: INTERNAL MEDICINE

## 2018-10-26 RX ORDER — FUROSEMIDE 20 MG/1
20 TABLET ORAL DAILY
Status: DISCONTINUED | OUTPATIENT
Start: 2018-10-26 | End: 2018-10-26

## 2018-10-26 RX ORDER — HYDROMORPHONE HYDROCHLORIDE 1 MG/ML
0.2 INJECTION, SOLUTION INTRAMUSCULAR; INTRAVENOUS; SUBCUTANEOUS EVERY 5 MIN PRN
Status: DISCONTINUED | OUTPATIENT
Start: 2018-10-26 | End: 2018-10-26

## 2018-10-26 RX ORDER — PROPOFOL 10 MG/ML
VIAL (ML) INTRAVENOUS
Status: DISCONTINUED | OUTPATIENT
Start: 2018-10-26 | End: 2018-10-26

## 2018-10-26 RX ORDER — EPHEDRINE SULFATE 50 MG/ML
INJECTION, SOLUTION INTRAVENOUS
Status: DISCONTINUED | OUTPATIENT
Start: 2018-10-26 | End: 2018-10-26

## 2018-10-26 RX ORDER — SODIUM CHLORIDE 0.9 % (FLUSH) 0.9 %
3 SYRINGE (ML) INJECTION
Status: DISCONTINUED | OUTPATIENT
Start: 2018-10-26 | End: 2018-10-26

## 2018-10-26 RX ORDER — MIDAZOLAM HYDROCHLORIDE 1 MG/ML
INJECTION, SOLUTION INTRAMUSCULAR; INTRAVENOUS
Status: DISCONTINUED | OUTPATIENT
Start: 2018-10-26 | End: 2018-10-26

## 2018-10-26 RX ORDER — CLOPIDOGREL BISULFATE 75 MG/1
75 TABLET ORAL DAILY
Status: DISCONTINUED | OUTPATIENT
Start: 2018-10-26 | End: 2018-10-27 | Stop reason: HOSPADM

## 2018-10-26 RX ORDER — FENTANYL CITRATE 50 UG/ML
INJECTION, SOLUTION INTRAMUSCULAR; INTRAVENOUS
Status: DISCONTINUED | OUTPATIENT
Start: 2018-10-26 | End: 2018-10-26

## 2018-10-26 RX ORDER — SERTRALINE HYDROCHLORIDE 25 MG/1
25 TABLET, FILM COATED ORAL DAILY
Status: DISCONTINUED | OUTPATIENT
Start: 2018-10-26 | End: 2018-10-27 | Stop reason: HOSPADM

## 2018-10-26 RX ORDER — PHENYLEPHRINE HYDROCHLORIDE 10 MG/ML
INJECTION INTRAVENOUS
Status: DISCONTINUED | OUTPATIENT
Start: 2018-10-26 | End: 2018-10-26

## 2018-10-26 RX ORDER — ACETAMINOPHEN 325 MG/1
650 TABLET ORAL EVERY 6 HOURS PRN
Status: DISCONTINUED | OUTPATIENT
Start: 2018-10-26 | End: 2018-10-27 | Stop reason: HOSPADM

## 2018-10-26 RX ORDER — SODIUM CHLORIDE 9 MG/ML
INJECTION, SOLUTION INTRAVENOUS CONTINUOUS PRN
Status: DISCONTINUED | OUTPATIENT
Start: 2018-10-26 | End: 2018-10-26

## 2018-10-26 RX ORDER — PANTOPRAZOLE SODIUM 40 MG/1
40 TABLET, DELAYED RELEASE ORAL DAILY
Status: DISCONTINUED | OUTPATIENT
Start: 2018-10-26 | End: 2018-10-27 | Stop reason: HOSPADM

## 2018-10-26 RX ORDER — DEXMEDETOMIDINE HYDROCHLORIDE 100 UG/ML
INJECTION, SOLUTION INTRAVENOUS
Status: DISCONTINUED | OUTPATIENT
Start: 2018-10-26 | End: 2018-10-26

## 2018-10-26 RX ORDER — CLOPIDOGREL BISULFATE 75 MG/1
75 TABLET ORAL DAILY
Status: DISCONTINUED | OUTPATIENT
Start: 2018-10-26 | End: 2018-10-26

## 2018-10-26 RX ORDER — MEPERIDINE HYDROCHLORIDE 50 MG/ML
12.5 INJECTION INTRAMUSCULAR; INTRAVENOUS; SUBCUTANEOUS ONCE AS NEEDED
Status: DISCONTINUED | OUTPATIENT
Start: 2018-10-26 | End: 2018-10-26

## 2018-10-26 RX ORDER — KETAMINE HCL IN 0.9 % NACL 50 MG/5 ML
SYRINGE (ML) INTRAVENOUS
Status: DISCONTINUED | OUTPATIENT
Start: 2018-10-26 | End: 2018-10-26

## 2018-10-26 RX ADMIN — PHENYLEPHRINE HYDROCHLORIDE 200 MCG: 10 INJECTION INTRAVENOUS at 12:10

## 2018-10-26 RX ADMIN — SODIUM CHLORIDE: 0.9 INJECTION, SOLUTION INTRAVENOUS at 11:10

## 2018-10-26 RX ADMIN — PHENYLEPHRINE HYDROCHLORIDE 200 MCG: 10 INJECTION INTRAVENOUS at 02:10

## 2018-10-26 RX ADMIN — RIVAROXABAN 15 MG: 15 TABLET, FILM COATED ORAL at 09:10

## 2018-10-26 RX ADMIN — PHENYLEPHRINE HYDROCHLORIDE 200 MCG: 10 INJECTION INTRAVENOUS at 01:10

## 2018-10-26 RX ADMIN — EPHEDRINE SULFATE 10 MG: 50 INJECTION, SOLUTION INTRAMUSCULAR; INTRAVENOUS; SUBCUTANEOUS at 03:10

## 2018-10-26 RX ADMIN — CLOPIDOGREL 75 MG: 75 TABLET, FILM COATED ORAL at 09:10

## 2018-10-26 RX ADMIN — Medication 20 MG: at 12:10

## 2018-10-26 RX ADMIN — PHENYLEPHRINE HYDROCHLORIDE 100 MCG: 10 INJECTION INTRAVENOUS at 01:10

## 2018-10-26 RX ADMIN — SERTRALINE HYDROCHLORIDE 25 MG: 25 TABLET ORAL at 09:10

## 2018-10-26 RX ADMIN — FENTANYL CITRATE 20 MCG: 50 INJECTION, SOLUTION INTRAMUSCULAR; INTRAVENOUS at 01:10

## 2018-10-26 RX ADMIN — DEXMEDETOMIDINE HYDROCHLORIDE 1 MCG/KG/HR: 100 INJECTION, SOLUTION, CONCENTRATE INTRAVENOUS at 12:10

## 2018-10-26 RX ADMIN — MIDAZOLAM 2 MG: 1 INJECTION INTRAMUSCULAR; INTRAVENOUS at 11:10

## 2018-10-26 RX ADMIN — PHENYLEPHRINE HYDROCHLORIDE 100 MCG: 10 INJECTION INTRAVENOUS at 02:10

## 2018-10-26 RX ADMIN — MIDAZOLAM 1 MG: 1 INJECTION INTRAMUSCULAR; INTRAVENOUS at 12:10

## 2018-10-26 RX ADMIN — PROPOFOL 30 MG: 10 INJECTION, EMULSION INTRAVENOUS at 12:10

## 2018-10-26 RX ADMIN — DEXMEDETOMIDINE HYDROCHLORIDE 30 MCG: 100 INJECTION, SOLUTION, CONCENTRATE INTRAVENOUS at 12:10

## 2018-10-26 RX ADMIN — Medication 10 MG: at 12:10

## 2018-10-26 RX ADMIN — DEXMEDETOMIDINE HYDROCHLORIDE 20 MCG: 100 INJECTION, SOLUTION, CONCENTRATE INTRAVENOUS at 01:10

## 2018-10-26 RX ADMIN — DEXMEDETOMIDINE HYDROCHLORIDE 10 MCG: 100 INJECTION, SOLUTION, CONCENTRATE INTRAVENOUS at 12:10

## 2018-10-26 RX ADMIN — PANTOPRAZOLE SODIUM 40 MG: 40 TABLET, DELAYED RELEASE ORAL at 09:10

## 2018-10-26 RX ADMIN — DEXMEDETOMIDINE HYDROCHLORIDE 20 MCG: 100 INJECTION, SOLUTION, CONCENTRATE INTRAVENOUS at 12:10

## 2018-10-26 NOTE — BRIEF OP NOTE
Patient is s/p typical CTI dependent A flutter ablation:   Tolerated procedure well. No acute complication noted.  Post op care per protocol.  Will monitor in recovery on tele overnight  Prn tylenol .  F/u on ekg.  Resume xeralto tonight   L CFV access - hemostasis with manual pressure.

## 2018-10-26 NOTE — TRANSFER OF CARE
"Anesthesia Transfer of Care Note    Patient: Sari Biswas    Procedure(s) Performed: Procedure(s) (LRB):  Ablation (N/A)  ECHOCARDIOGRAM, TRANSESOPHAGEAL (N/A)    Patient location: PACU    Anesthesia Type: MAC    Transport from OR: Transported from OR on room air with adequate spontaneous ventilation    Post pain: adequate analgesia    Post assessment: tolerated procedure well and no apparent anesthetic complications    Vital signs course: NIBP low in PACU.    Level of consciousness: awake, alert and oriented    Nausea/Vomiting: no nausea/vomiting    Complications: none    Transfer of care protocol was followed      Last vitals:   Visit Vitals  BP (!) 69/45   Pulse 63   Temp 36.7 °C (98.1 °F) (Skin)   Resp 20   Ht 5' 5" (1.651 m)   Wt 72.6 kg (160 lb)   SpO2 95%   Breastfeeding? No   BMI 26.63 kg/m²     "

## 2018-10-26 NOTE — SUBJECTIVE & OBJECTIVE
Past Medical History:   Diagnosis Date    Coronary artery disease     Coronary artery disease involving native coronary artery of native heart 7/21/2018    DVT (deep venous thrombosis) 2012    left calf    Hypertension     Paroxysmal atrial fibrillation     SVT (supraventricular tachycardia)        Past Surgical History:   Procedure Laterality Date    ANGIOGRAM-CORONARY N/A 6/19/2018    Performed by Kumar Colon MD at [facility] CATH    Angiogram-Renal Bilateral Selective N/A 6/19/2018    Performed by Kumar Colon MD at [facility] CATH    APPENDECTOMY      ATHERECTOMY N/A 7/11/2018    Procedure: Atherectomy;  Surgeon: Kumar Colon MD;  Location: [facility] CATH;  Service: Cardiology;  Laterality: N/A;    Atherectomy N/A 7/11/2018    [facility]    BREAST BIOPSY Left     over 10 yrs. ago    CARDIAC CATHETERIZATION      CARDIAC SURGERY      angiogram    Cardioversion/Defibrillation N/A 10/8/2018    Performed by Rodo Singleton MD at [facility] SE[facility]US    CATARACT EXTRACTION W/  INTRAOCULAR LENS IMPLANT      CHOLECYSTECTOMY      CORONARY ANGIOGRAPHY N/A 6/19/2018    Procedure: ANGIOGRAM-CORONARY;  Surgeon: Kumar Colon MD;  Location: [facility] CATH;  Service: Cardiology;  Laterality: N/A;    CORONARY ARTERY BYPASS GRAFT      CORONARY ARTERY BYPASS GRAFT (CABG) N/A 7/11/2018    Procedure: CREATION, BYPASS, ARTERIAL, AORTA TO CORONARY, USING GRAFT  EVSH;  Surgeon: Aurora Hunter MD;  Location: [facility];  Service: Cardiovascular;  Laterality: N/A;  emergency    CORONARY STENT PLACEMENT N/A 7/11/2018    Procedure: Stent DIXIE coronary;  Surgeon: Kumar Colon MD;  Location: [facility] CATH;  Service: Cardiology;  Laterality: N/A;    CREATION, BYPASS, ARTERIAL, AORTA TO CORONARY, USING GRAFT  EVSH N/A 7/11/2018    Performed by Aurora Hunter MD at [facility] OR    HYSTERECTOMY      INSERTION OF INTRAVASCULAR MICROAXIAL BLOOD PUMP N/A 7/11/2018    Procedure: INSERTION, IMPELLA;  Surgeon: Kumar  "JOCELYNE Colon MD;  [facility];  Service: Cardiology;  Laterality: N/A;    INSERTION OF TEMPORARY PACEMAKER N/A 7/11/2018    Procedure: INSERTION, PACEMAKER, TEMPORARY;  Surgeon: Kumar Colon MD;  [facility];  Service: Cardiology;  Laterality: N/A;    INSERTION, IMPELLA N/A 7/11/2018    [facility]    INSERTION, PACEMAKER, TEMPORARY N/A 7/11/2018    [facility]    KNEE ARTHROSCOPY W/ DEBRIDEMENT  2012    Left heart cath Left 7/11/2018    [facility]    Left heart cath Left 6/19/2018    [facility]    LEFT HEART CATHETERIZATION Left 6/19/2018    Procedure: Left heart cath;  Surgeon: Kumar Colon MD;  [facility];  Service: Cardiology;  Laterality: Left;    LEFT HEART CATHETERIZATION Left 7/11/2018    Procedure: Left heart cath;  Surgeon: Kumar Colon MD;  [facility];  Service: Cardiology;  Laterality: Left;    PERICARDIOCENTESIS N/A 7/11/2018    Procedure: Pericardiocentesis;  Surgeon: Kumar Colon MD;  [facility];  Service: Cardiology;  Laterality: N/A;    Pericardiocentesis N/A 7/11/2018    [facility]    Stent DIXIE coronary N/A 7/11/2018    [facility]    TREATMENT OF CARDIAC ARRHYTHMIA N/A 10/8/2018    Procedure: Cardioversion/Defibrillation;  Surgeon: Rodo Singleton MD;  Location: [facility];  Service: Cardiology;  Laterality: N/A;       Review of patient's allergies indicates:   Allergen Reactions    Bactrim [sulfamethoxazole-trimethoprim] Anxiety     "It makes me jittery."       No current facility-administered medications on file prior to encounter.      Current Outpatient Medications on File Prior to Encounter   Medication Sig    clopidogrel (PLAVIX) 75 mg tablet Take 1 tablet (75 mg total) by mouth once daily. (Patient taking " differently: Take 75 mg by mouth every other day. )    cranberry 400 mg Cap Take 1 capsule by mouth.     furosemide (LASIX) 20 MG tablet TAKE 1 TABLET BY MOUTH EVERY DAY    pantoprazole (PROTONIX) 40 MG tablet Take 1 tablet (40 mg total) by mouth once daily.    potassium chloride (MICRO-K) 10 MEQ CpSR TAKE 1 CAPSULE (10 MEQ TOTAL) BY MOUTH ONCE DAILY. (Patient taking differently: Take 10 mEq by mouth. )    rivaroxaban (XARELTO) 15 mg Tab Take 1 tablet (15 mg total) by mouth 2 (two) times daily with meals. 15 mg PO BID with meals x 21 days, then 20 mg PO daily with dinner (Patient taking differently: Take 20 mg by mouth once daily. 15 mg PO BID with meals x 21 days, then 20 mg PO daily with dinner)    rosuvastatin (CRESTOR) 20 MG tablet Take 1 tablet (20 mg total) by mouth every evening. (Patient taking differently: Take 5 mg by mouth every evening. )    sacubitril-valsartan (ENTRESTO) 24-26 mg per tablet Take 1 tablet by mouth 2 (two) times daily.    sertraline (ZOLOFT) 25 MG tablet Take 25 mg by mouth 2 (two) times daily.     spironolactone (ALDACTONE) 25 MG tablet Take 0.5 tablets (12.5 mg total) by mouth once daily. (Patient taking differently: Take 12.5 mg by mouth. )    TOPROL XL 25 mg 24 hr tablet Take 12.5 mg by mouth 2 (two) times daily.     vit C,U-Pq-iscuw-lutein-zeaxan (PRESERVISION AREDS 2) 096-033-36-1 mg-unit-mg-mg Cap Take by mouth 2 (two) times daily.    VITAMIN D2 50,000 unit capsule TAKE 1 CAPSULE BY MOUTH THREE TIMES A WEEK    acyclovir 5% (ZOVIRAX) 5 % Crea 5 x per day     Family History     Problem Relation (Age of Onset)    Breast cancer Sister (78), Paternal Aunt    Cancer Father, Brother, Sister        Tobacco Use    Smoking status: Former Smoker     Last attempt to quit:      Years since quittin.8    Smokeless tobacco: Never Used    Tobacco comment: quit smoking in .   Substance and Sexual Activity    Alcohol use: Yes      Alcohol/week: 0.6 oz     Types: 1 Glasses of wine per week     Comment: seldom    Drug use: No    Sexual activity: Not on file     Review of Systems   Constitution: Negative.   HENT: Negative.    Eyes: Negative.    Cardiovascular: Negative.    Respiratory: Negative.    Endocrine: Negative.    Skin: Negative.    Musculoskeletal: Negative.    Gastrointestinal: Negative.    Genitourinary: Negative.    Neurological: Negative.      Objective:     Vital Signs (Most Recent):  Temp: 97.5 °F (36.4 °C) (10/26/18 1000)  Pulse: (!) 116 (10/26/18 1000)  Resp: 20 (10/26/18 1000)  BP: 120/77 (10/26/18 1000)  SpO2: 99 % (10/26/18 1000) Vital Signs (24h Range):  Temp:  [97.5 °F (36.4 °C)] 97.5 °F (36.4 °C)  Pulse:  [116] 116  Resp:  [20] 20  SpO2:  [99 %] 99 %  BP: (120)/(77) 120/77     Weight: 72.6 kg (160 lb)  Body mass index is 26.63 kg/m².    SpO2: 99 %  O2 Device (Oxygen Therapy): room air    No intake or output data in the 24 hours ending 10/26/18 1052    Lines/Drains/Airways     Peripheral Intravenous Line                 Peripheral IV - Double Lumen 10/08/18 0937 Anterior;Right Forearm 18 days         Peripheral IV - Single Lumen 10/26/18 1006 Right Antecubital less than 1 day                Physical Exam   Constitutional: She is oriented to person, place, and time. She appears well-developed and well-nourished.   HENT:   Head: Normocephalic and atraumatic.   Eyes: Conjunctivae and EOM are normal. Pupils are equal, round, and reactive to light.   Neck: Normal range of motion. Neck supple. No JVD present.   Cardiovascular: Normal rate, regular rhythm and normal heart sounds. Exam reveals no gallop and no friction rub.   No murmur heard.  Pulmonary/Chest: Effort normal and breath sounds normal. No respiratory distress. She has no wheezes. She has no rales. She exhibits no tenderness.   Abdominal: Soft. Bowel sounds are normal. She exhibits no distension. There is no tenderness.   Musculoskeletal: Normal range of motion. She  exhibits no edema or tenderness.   Neurological: She is alert and oriented to person, place, and time.   Skin: Skin is warm and dry. No erythema. No pallor.       Significant Labs:   Recent Lab Results       10/26/18  0956        Immature Granulocytes 0.4     Immature Grans (Abs) 0.03  Comment:  Mild elevation in immature granulocytes is non specific and   can be seen in a variety of conditions including stress response,   acute inflammation, trauma and pregnancy. Correlation with other   laboratory and clinical findings is essential.       Anion Gap 11     aPTT 26.5  Comment:  aPTT therapeutic range = 39-69 seconds     Baso # 0.04     Basophil% 0.5     BUN, Bld 17     Calcium 9.5     Chloride 103     CO2 28     Creatinine 0.9     Differential Method Automated     eGFR if African American >60.0     eGFR if non  >60.0  Comment:  Calculation used to obtain the estimated glomerular filtration  rate (eGFR) is the CKD-EPI equation.        Eos # 0.3     Eosinophil% 4.4     Glucose 121     Gran # (ANC) 6.0     Gran% 80.1     Hematocrit 37.7     Hemoglobin 11.8     Coumadin Monitoring INR 1.3  Comment:  Coumadin Therapy:  2.0 - 3.0 for INR for all indicators except mechanical heart valves  and antiphospholipid syndromes which should use 2.5 - 3.5.       Lymph # 0.5     Lymph% 6.3     MCH 29.6     MCHC 31.3     MCV 95     Mono # 0.6     Mono% 8.3     MPV 10.4     nRBC 0     Platelets 308     Potassium 3.8     Protime 13.3     RBC 3.98     RDW 15.7     Sodium 142     WBC 7.50           Significant Imaging: as per hpi

## 2018-10-26 NOTE — NURSING TRANSFER
Nursing Transfer Note      10/26/2018     Transfer To: 343a    Transfer via stretcher    Transfer with cardiac monitoring    Transported by escort    Medicines sent: no    Chart send with patient: Yes    Notified: spouse    Patient reassessed at: 10/26/18@1630hrs

## 2018-10-26 NOTE — NURSING
Received to unit via stretcher, accompanied by pt escort, transferred to bed per assist x3.  aaox4, tele nsr, hr 70. Left groin site with pressure guard intact, no bleeding no hematoma.life vest at bedside. Flay at bedside. Oriented to environment, verbalize understanding. Will continue to monitor.

## 2018-10-26 NOTE — ANESTHESIA PREPROCEDURE EVALUATION
10/26/2018  Sari Biswas is a 78 y.o., female.    Anesthesia Evaluation    I have reviewed the Patient Summary Reports.     I have reviewed the Medications.     Review of Systems  Anesthesia Hx:  No problems with previous Anesthesia  History of prior surgery of interest to airway management or planning: heart surgery. Previous anesthesia: General   Social:  Former Smoker, Social Alcohol Use    Hematology/Oncology:  Hematology Normal   Oncology Normal     EENT/Dental:EENT/Dental Normal   Cardiovascular:   Exercise tolerance: good Hypertension, well controlled CAD asymptomatic  CHF · Left ventricle ejection fraction is severely decreased at 20%  · Left ventricular diastolic dysfunction.  · RV systolic function is mildly reduced.  · Left atrium is mildly dilated.  · Right atrium is mildly dilated.  · Trace aortic regurgitation.  · Mitral valve is mildly sclerotic  · Mild mitral regurgitation.  · The estimated PA systolic pressure is 46.69 mm Hg   Pulmonary:  Pulmonary Normal    Renal/:  Renal/ Normal     Hepatic/GI:  Hepatic/GI Normal    Musculoskeletal:  Musculoskeletal Normal    Neurological:  Neurology Normal    Endocrine:  Endocrine Normal    Dermatological:  Skin Normal    Psych:  Psychiatric Normal           Physical Exam  General:  Well nourished    Airway/Jaw/Neck:  Airway Findings: Mouth Opening: Normal Tongue: Normal  General Airway Assessment: Adult  Mallampati: II  TM Distance: Normal, at least 6 cm  Jaw/Neck Findings:  Neck ROM: Normal ROM      Dental:  Dental Findings: In tact        Mental Status:  Mental Status Findings:  Cooperative, Alert and Oriented         Anesthesia Plan  Type of Anesthesia, risks & benefits discussed:  Anesthesia Type:  general, MAC  Patient's Preference: GA  Intra-op Monitoring Plan: standard ASA monitors  Intra-op Monitoring Plan Comments:   Post Op Pain Control  Plan: multimodal analgesia  Post Op Pain Control Plan Comments:   Induction:   IV  Beta Blocker:  Patient is on a Beta-Blocker and has received one dose within the past 24 hours (No further documentation required).       Informed Consent: Patient understands risks and agrees with Anesthesia plan.  Questions answered. Anesthesia consent signed with patient.  ASA Score: 4     Day of Surgery Review of History & Physical:  There are no significant changes.          Ready For Surgery From Anesthesia Perspective.

## 2018-10-26 NOTE — HPI
TRANSESOPHAGEAL ECHOCARDIOGRAPHY   PRE-PROCEDURE NOTE    10/26/2018    HPI:     Sari Biswas is a 78 y.o. woman with PMHx of CAD s/p 3VCABG and LAD sewing post rupture during attempted PCI, ICM EF 25%, HTN, HLD presenting for CTI RFA of typical CCWAFL.  Needs evaluation of LA/JARON prior to ablation.  Is on xarelto for AC.  TTE 10/22 revealed ef 25% +DD, LA/RA enlarged, PASP 47, LOUANN 10/8 showed mild-mod MR no LAEV recorded.  EKG today shows typical CCW AFL with 2:1 conduction.    Dysphagia or odynophagia:  No  Liver Disease, esophageal disease, or known varices:  No  Upper GI Bleeding: No  Snoring:  Yes  Sleep Apnea:  No  Prior neck surgery or radiation:  No  History of anesthetic difficulties:  No  Family history of anesthetic difficulties:  No  Last oral intake:  12 hours ago  Able to move neck in all directions:  Yes    Mallampati:2  ASA:2

## 2018-10-26 NOTE — ANESTHESIA POSTPROCEDURE EVALUATION
"Anesthesia Post Evaluation    Patient: Sari Biswas    Procedure(s) Performed: Procedure(s) (LRB):  Ablation (N/A)  ECHOCARDIOGRAM, TRANSESOPHAGEAL (N/A)    Final Anesthesia Type: general  Patient location during evaluation: PACU  Patient participation: Yes- Able to Participate  Level of consciousness: awake and alert  Post-procedure vital signs: reviewed and stable  Pain management: adequate  Airway patency: patent  PONV status at discharge: No PONV  Anesthetic complications: no      Cardiovascular status: blood pressure returned to baseline and hemodynamically stable  Respiratory status: unassisted, spontaneous ventilation and room air  Hydration status: euvolemic  Follow-up not needed.        Visit Vitals  BP (!) 88/58   Pulse 72   Temp 36.6 °C (97.9 °F) (Skin)   Resp 20   Ht 5' 5" (1.651 m)   Wt 72.6 kg (160 lb)   SpO2 95%   Breastfeeding? No   BMI 26.63 kg/m²       Pain/Katharine Score: Pain Assessment Performed: Yes (10/26/2018  4:30 PM)  Presence of Pain: denies (10/26/2018  4:30 PM)  Katharine Score: 9 (10/26/2018  4:30 PM)        "

## 2018-10-26 NOTE — CONSULTS
[facility]  Cardiology  Consult Note    Patient Name: Sari Biswas  MRN: [id number]  Admission Date: 10/26/2018  Hospital Length of Stay: 0 days  Code Status: Prior   Attending Provider: Bonilla Fitzgerald MD   Consulting Provider: Jony Cancino MD  Primary Care Physician: Turner Anne MD  Principal Problem:<principal problem not specified>    Patient information was obtained from patient and past medical records.     Consults  Subjective:         TRANSESOPHAGEAL ECHOCARDIOGRAPHY   PRE-PROCEDURE NOTE    10/26/2018    HPI:     Sari Biswas is a 78 y.o. woman with PMHx of CAD s/p 3VCABG and LAD sewing post rupture during attempted PCI, ICM EF 25%, HTN, HLD presenting for CTI RFA of typical CCWAFL.  Needs evaluation of LA/JARON prior to ablation.  Is on xarelto for AC.  TTE 10/22 revealed ef 25% +DD, LA/RA enlarged, PASP 47, LOUANN 10/8 showed mild-mod MR no LAEV recorded.  EKG today shows typical CCW AFL with 2:1 conduction.    Dysphagia or odynophagia:  No  Liver Disease, esophageal disease, or known varices:  No  Upper GI Bleeding: No  Snoring:  Yes  Sleep Apnea:  No  Prior neck surgery or radiation:  No  History of anesthetic difficulties:  No  Family history of anesthetic difficulties:  No  Last oral intake:  12 hours ago  Able to move neck in all directions:  Yes    Mallampati:2  ASA:2      Past Medical History:   Diagnosis Date    Coronary artery disease     Coronary artery disease involving native coronary artery of native heart 7/21/2018    DVT (deep venous thrombosis) 2012    left calf    Hypertension     Paroxysmal atrial fibrillation     SVT (supraventricular tachycardia)        Past Surgical History:   Procedure Laterality Date    ANGIOGRAM-CORONARY N/A 6/19/2018    Performed by [facility] CATH    Angiogram-Renal Bilateral Selective N/A 6/19/2018    Performed by Kumar [facility] at [facility] CATH    APPENDECTOMY      ATHERECTOMY N/A 7/11/2018    Procedure:  Atherectomy;  Surgeon: Kumar Colon MD;  Location: [facility];  Service: Cardiology;  Laterality: N/A;    Atherectomy N/A 7/11/2018    Performed by [facility] at [facility]    BREAST BIOPSY Left     over 10 yrs. ago    CARDIAC CATHETERIZATION      CARDIAC SURGERY      angiogram    Cardioversion/Defibrillation N/A 10/8/2018    Performed by Rodo Singleton MD at [facility] SEAMUS    CATARACT EXTRACTION W/  INTRAOCULAR LENS IMPLANT      CHOLECYSTECTOMY      CORONARY ANGIOGRAPHY N/A 6/19/2018    Procedure: ANGIOGRAM-CORONARY;  Surgeon: Kumar Colon MD;  Location: [facility];  Service: Cardiology;  Laterality: N/A;    CORONARY ARTERY BYPASS GRAFT      CORONARY ARTERY BYPASS GRAFT (CABG) N/A 7/11/2018    Procedure: CREATION, BYPASS, ARTERIAL, AORTA TO CORONARY, USING GRAFT  EVSH;  Surgeon: Aurora Hunter MD;  Location: [facility];  Service: Cardiovascular;  Laterality: N/A;  emergency    CORONARY STENT PLACEMENT N/A 7/11/2018    Procedure: Stent DIXIE coronary;  Surgeon: Kumar Colon MD;  Location: [facility] CATH;  Service: Cardiology;  Laterality: N/A;    CREATION, BYPASS, ARTERIAL, AORTA TO CORONARY, USING GRAFT  EVSH N/A 7/11/2018    Performed by Aurora Hunter MD at [facility] OR    HYSTERECTOMY      INSERTION OF INTRAVASCULAR MICROAXIAL BLOOD PUMP N/A 7/11/2018    Procedure: INSERTION, IMPELLA;  Surgeon: Kumar Colon MD;  Location: [facility] CATH;  Service: Cardiology;  Laterality: N/A;    INSERTION OF TEMPORARY PACEMAKER N/A 7/11/2018    Procedure: INSERTION, PACEMAKER, TEMPORARY;  Surgeon: Kumar Colon MD;  Location: [facility];  Service: Cardiology;  Laterality: N/A;    INSERTION, IMPELLA N/A 7/11/2018    Performed by [facility] at [facility]    INSERTION, PACEMAKER, TEMPORARY N/A 7/11/2018    Performed by Kumar [facility] MD at [facility]    KNEE ARTHROSCOPY W/ DEBRIDEMENT  2012    Left heart cath Left 7/11/2018    Performed by Kumar [facility] MD at [facility] CATH    Left heart cath Left  "6/19/2018    Performed by [facility] at [facility] CATH    LEFT HEART CATHETERIZATION Left 6/19/2018    Procedure: Left heart cath;  Surgeon: Kumar Colon MD;  Location: [facility] CATH;  Service: Cardiology;  Laterality: Left;    LEFT HEART CATHETERIZATION Left 7/11/2018    Procedure: Left heart cath;  Surgeon: Kumar Colon MD;  Location: [facility] CATH;  Service: Cardiology;  Laterality: Left;    PERICARDIOCENTESIS N/A 7/11/2018    Procedure: Pericardiocentesis;  Surgeon: Kumar Colon MD;  Location: [facility] CATH;  Service: Cardiology;  Laterality: N/A;    Pericardiocentesis N/A 7/11/2018    Performed by Kumar [facility] MD at [facility] CATH    Stent DIXIE coronary N/A 7/11/2018    Performed by [facility] at [facility]    TREATMENT OF CARDIAC ARRHYTHMIA N/A 10/8/2018    Procedure: Cardioversion/Defibrillation;  Surgeon: Rodo Singleton MD;  Location: [facility];  Service: Cardiology;  Laterality: N/A;       Review of patient's allergies indicates:   Allergen Reactions    Bactrim [sulfamethoxazole-trimethoprim] Anxiety     "It makes me jittery."       No current facility-administered medications on file prior to encounter.      Current Outpatient Medications on File Prior to Encounter   Medication Sig    clopidogrel (PLAVIX) 75 mg tablet Take 1 tablet (75 mg total) by mouth once daily. (Patient taking differently: Take 75 mg by mouth every other day. )    cranberry 400 mg Cap Take 1 capsule by mouth.     furosemide (LASIX) 20 MG tablet TAKE 1 TABLET BY MOUTH EVERY DAY    pantoprazole (PROTONIX) 40 MG tablet Take 1 tablet (40 mg total) by mouth once daily.    potassium chloride (MICRO-K) 10 MEQ CpSR TAKE 1 CAPSULE (10 MEQ TOTAL) BY MOUTH ONCE DAILY. (Patient taking differently: Take 10 mEq by mouth. Saturday Sunday Tuesday Thursday)    rivaroxaban (XARELTO) 15 mg Tab Take 1 tablet (15 mg total) by mouth 2 (two) times daily with meals. 15 mg PO BID with meals x 21 days, then 20 mg PO daily with " dinner (Patient taking differently: Take 20 mg by mouth once daily. 15 mg PO BID with meals x 21 days, then 20 mg PO daily with dinner)    rosuvastatin (CRESTOR) 20 MG tablet Take 1 tablet (20 mg total) by mouth every evening. (Patient taking differently: Take 5 mg by mouth every evening. )    sacubitril-valsartan (ENTRESTO) 24-26 mg per tablet Take 1 tablet by mouth 2 (two) times daily.    sertraline (ZOLOFT) 25 MG tablet Take 25 mg by mouth 2 (two) times daily.     spironolactone (ALDACTONE) 25 MG tablet Take 0.5 tablets (12.5 mg total) by mouth once daily. (Patient taking differently: Take 12.5 mg by mouth. )    TOPROL XL 25 mg 24 hr tablet Take 12.5 mg by mouth 2 (two) times daily.     vit C,P-Nj-jkzey-lutein-zeaxan (PRESERVISION AREDS 2) 319-124-10-1 mg-unit-mg-mg Cap Take by mouth 2 (two) times daily.    VITAMIN D2 50,000 unit capsule TAKE 1 CAPSULE BY MOUTH THREE TIMES A WEEK    acyclovir 5% (ZOVIRAX) 5 % Crea 5 x per day     Family History     Problem Relation (Age of Onset)    Breast cancer Sister (78), Paternal Aunt    Cancer Father, Brother, Sister        Tobacco Use    Smoking status: Former Smoker     Last attempt to quit:      Years since quittin.8    Smokeless tobacco: Never Used    Tobacco comment: quit smoking in .   Substance and Sexual Activity    Alcohol use: Yes     Alcohol/week: 0.6 oz     Types: 1 Glasses of wine per week     Comment: seldom    Drug use: No    Sexual activity: Not on file     Review of Systems   Constitution: Negative.   HENT: Negative.    Eyes: Negative.    Cardiovascular: Negative.    Respiratory: Negative.    Endocrine: Negative.    Skin: Negative.    Musculoskeletal: Negative.    Gastrointestinal: Negative.    Genitourinary: Negative.    Neurological: Negative.      Objective:     Vital Signs (Most Recent):  Temp: 97.5 °F (36.4 °C) (10/26/18 1000)  Pulse: (!) 116 (10/26/18 1000)  Resp: 20 (10/26/18 1000)  BP: 120/77 (10/26/18 1000)  SpO2: 99 %  (10/26/18 1000) Vital Signs (24h Range):  Temp:  [97.5 °F (36.4 °C)] 97.5 °F (36.4 °C)  Pulse:  [116] 116  Resp:  [20] 20  SpO2:  [99 %] 99 %  BP: (120)/(77) 120/77     Weight: 72.6 kg (160 lb)  Body mass index is 26.63 kg/m².    SpO2: 99 %  O2 Device (Oxygen Therapy): room air    No intake or output data in the 24 hours ending 10/26/18 1052    Lines/Drains/Airways     Peripheral Intravenous Line                 Peripheral IV - Double Lumen 10/08/18 0937 Anterior;Right Forearm 18 days         Peripheral IV - Single Lumen 10/26/18 1006 Right Antecubital less than 1 day                Physical Exam   Constitutional: She is oriented to person, place, and time. She appears well-developed and well-nourished.   HENT:   Head: Normocephalic and atraumatic.   Eyes: Conjunctivae and EOM are normal. Pupils are equal, round, and reactive to light.   Neck: Normal range of motion. Neck supple. No JVD present.   Cardiovascular: Normal rate, regular rhythm and normal heart sounds. Exam reveals no gallop and no friction rub.   No murmur heard.  Pulmonary/Chest: Effort normal and breath sounds normal. No respiratory distress. She has no wheezes. She has no rales. She exhibits no tenderness.   Abdominal: Soft. Bowel sounds are normal. She exhibits no distension. There is no tenderness.   Musculoskeletal: Normal range of motion. She exhibits no edema or tenderness.   Neurological: She is alert and oriented to person, place, and time.   Skin: Skin is warm and dry. No erythema. No pallor.       Significant Labs:   Recent Lab Results       10/26/18  0956        Immature Granulocytes 0.4     Immature Grans (Abs) 0.03  Comment:  Mild elevation in immature granulocytes is non specific and   can be seen in a variety of conditions including stress response,   acute inflammation, trauma and pregnancy. Correlation with other   laboratory and clinical findings is essential.       Anion Gap 11     aPTT 26.5  Comment:  aPTT therapeutic range =  39-69 seconds     Baso # 0.04     Basophil% 0.5     BUN, Bld 17     Calcium 9.5     Chloride 103     CO2 28     Creatinine 0.9     Differential Method Automated     eGFR if African American >60.0     eGFR if non  >60.0  Comment:  Calculation used to obtain the estimated glomerular filtration  rate (eGFR) is the CKD-EPI equation.        Eos # 0.3     Eosinophil% 4.4     Glucose 121     Gran # (ANC) 6.0     Gran% 80.1     Hematocrit 37.7     Hemoglobin 11.8     Coumadin Monitoring INR 1.3  Comment:  Coumadin Therapy:  2.0 - 3.0 for INR for all indicators except mechanical heart valves  and antiphospholipid syndromes which should use 2.5 - 3.5.       Lymph # 0.5     Lymph% 6.3     MCH 29.6     MCHC 31.3     MCV 95     Mono # 0.6     Mono% 8.3     MPV 10.4     nRBC 0     Platelets 308     Potassium 3.8     Protime 13.3     RBC 3.98     RDW 15.7     Sodium 142     WBC 7.50           Significant Imaging: as per hpi    Assessment and Plan:     Typical atrial flutter    PLAN:  1. LOUANN for evaluation of LA/JARON    -The risks, benefits & alternatives of the procedure were explained to the patient.    -The risks of transesophageal echo include but are not limited to:  Dental trauma, esophageal trauma/perforation, bleeding, laryngospasm/brochospasm, aspiration, sore throat/hoarseness, & dislodgement of the endotracheal tube/nasogastric tube (where applicable).    -The risks of moderate sedation include hypotension, respiratory depression, arrhythmias, bronchospasm, & death.    -Informed consent was obtained & the patient is agreeable to proceed with the procedure.    I will discuss with the attending physician. Attending addendum is to follow.     Further recommendations per attending addendum    Jony Cancino MD  PGY-V Cardiology Fellow  [phone]

## 2018-10-26 NOTE — ASSESSMENT & PLAN NOTE
PLAN:  1. LOUANN for evaluation of LA/JARON    -The risks, benefits & alternatives of the procedure were explained to the patient.    -The risks of transesophageal echo include but are not limited to:  Dental trauma, esophageal trauma/perforation, bleeding, laryngospasm/brochospasm, aspiration, sore throat/hoarseness, & dislodgement of the endotracheal tube/nasogastric tube (where applicable).    -The risks of moderate sedation include hypotension, respiratory depression, arrhythmias, bronchospasm, & death.    -Informed consent was obtained & the patient is agreeable to proceed with the procedure.    I will discuss with the attending physician. Attending addendum is to follow.     Further recommendations per attending addendum    Jony Cancino MD  PGY-V Cardiology Fellow  [phone]

## 2018-10-26 NOTE — INTERVAL H&P NOTE
The patient has been examined and the H&P has been reviewed: EKG : typical atrial flutter in a patient with  H/o CABG  I concur with the findings and no changes have occurred since H&P was written.  We discussed the procedural details, risks and benefits of ablation with the patient and family.   In addition to the procedural details, we also discussed that while unlikely, catheter ablation is associated with several potential complications including, but not limited to, infection, bleeding , vascular complications, myocardial infarction, stroke, pericardial effusion/tamponade and, rarely, death. Patient appeared to understand the whole discussion and verbalized that all questions were answered to satisfaction. After deliberating over the options, explanation, and risks/ benefits of the ablation procedure, patient agreed to proceed with the procedure.     Active Hospital Problems    Diagnosis  POA    PAF (paroxysmal atrial fibrillation) [I48.0]  Yes    Typical atrial flutter [I48.3]  Yes      Resolved Hospital Problems   No resolved problems to display.       No current facility-administered medications on file prior to encounter.      Current Outpatient Medications on File Prior to Encounter   Medication Sig Dispense Refill    clopidogrel (PLAVIX) 75 mg tablet Take 1 tablet (75 mg total) by mouth once daily. (Patient taking differently: Take 75 mg by mouth every other day. ) 30 tablet 11    cranberry 400 mg Cap Take 1 capsule by mouth.       furosemide (LASIX) 20 MG tablet TAKE 1 TABLET BY MOUTH EVERY DAY 30 tablet 1    pantoprazole (PROTONIX) 40 MG tablet Take 1 tablet (40 mg total) by mouth once daily. 30 tablet 11    potassium chloride (MICRO-K) 10 MEQ CpSR TAKE 1 CAPSULE (10 MEQ TOTAL) BY MOUTH ONCE DAILY. (Patient taking differently: Take 10 mEq by mouth. Saturday Sunday Tuesday Thursday) 5 capsule 0    rivaroxaban (XARELTO) 15 mg Tab Take 1 tablet (15 mg total) by mouth 2 (two) times daily with meals.  15 mg PO BID with meals x 21 days, then 20 mg PO daily with dinner (Patient taking differently: Take 20 mg by mouth once daily. 15 mg PO BID with meals x 21 days, then 20 mg PO daily with dinner)      rosuvastatin (CRESTOR) 20 MG tablet Take 1 tablet (20 mg total) by mouth every evening. (Patient taking differently: Take 5 mg by mouth every evening. ) 90 tablet 3    sacubitril-valsartan (ENTRESTO) 24-26 mg per tablet Take 1 tablet by mouth 2 (two) times daily. 60 tablet 2    sertraline (ZOLOFT) 25 MG tablet Take 25 mg by mouth 2 (two) times daily.       spironolactone (ALDACTONE) 25 MG tablet Take 0.5 tablets (12.5 mg total) by mouth once daily. (Patient taking differently: Take 12.5 mg by mouth. ) 15 tablet 11    TOPROL XL 25 mg 24 hr tablet Take 12.5 mg by mouth 2 (two) times daily.       vit C,J-Mn-oobud-lutein-zeaxan (PRESERVISION AREDS 2) 803-729-15-1 mg-unit-mg-mg Cap Take by mouth 2 (two) times daily.      VITAMIN D2 50,000 unit capsule TAKE 1 CAPSULE BY MOUTH THREE TIMES A WEEK  1    acyclovir 5% (ZOVIRAX) 5 % Crea 5 x per day 2 g 1

## 2018-10-27 VITALS
RESPIRATION RATE: 16 BRPM | HEART RATE: 82 BPM | DIASTOLIC BLOOD PRESSURE: 56 MMHG | OXYGEN SATURATION: 95 % | SYSTOLIC BLOOD PRESSURE: 102 MMHG | HEIGHT: 65 IN | WEIGHT: 162.06 LBS | TEMPERATURE: 99 F | BODY MASS INDEX: 27 KG/M2

## 2018-10-27 PROCEDURE — 25000003 PHARM REV CODE 250: Performed by: INTERNAL MEDICINE

## 2018-10-27 RX ORDER — ACETAMINOPHEN 325 MG/1
650 TABLET ORAL EVERY 6 HOURS PRN
Refills: 0 | Status: ON HOLD | COMMUNITY
Start: 2018-10-27 | End: 2021-06-28 | Stop reason: HOSPADM

## 2018-10-27 RX ADMIN — RIVAROXABAN 15 MG: 15 TABLET, FILM COATED ORAL at 08:10

## 2018-10-27 RX ADMIN — SERTRALINE HYDROCHLORIDE 25 MG: 25 TABLET ORAL at 08:10

## 2018-10-27 RX ADMIN — CLOPIDOGREL 75 MG: 75 TABLET, FILM COATED ORAL at 08:10

## 2018-10-27 RX ADMIN — PANTOPRAZOLE SODIUM 40 MG: 40 TABLET, DELAYED RELEASE ORAL at 08:10

## 2018-10-27 NOTE — PLAN OF CARE
Problem: Patient Care Overview  Goal: Plan of Care Review  Outcome: Ongoing (interventions implemented as appropriate)  AAOx4. No signs of distress, unlabored breathing. Safety/ medical/ medication protocol initiated. S/P ablation. No signs of hematoma or bleeding. Life vest bedside.Today's plan of care explained. Pt has no questions/ concerns at this time. Will continue to monitor.

## 2018-10-27 NOTE — PROGRESS NOTES
Pt discharged via wheelchair with all gathered personal belongings accompanied by family. Vitals stable. Temp: 98.6  HR: 82 RR: 16 O2 Sat: 95% on room air BP: 102/56. Denies pain. Shows no signs of distress. Peripheral IV removed with pressure held x 3 min. IV catheter intact with no bleeding at site at time of discharge. Discharge instructions/educational information given and taught and pt verbalized understanding.

## 2018-10-27 NOTE — DISCHARGE SUMMARY
[facility]  Discharge Summary      Admit Date: 10/26/2018    Discharge Date and Time:  10/27/2018 10:26 AM    Attending Physician: Bonilla Fitzgerald MD     Reason for Admission: elective flutter ablation     Procedures Performed: Procedure(s) (LRB):  Ablation (N/A)  ECHOCARDIOGRAM, TRANSESOPHAGEAL (N/A)    Hospital Course :    Patient is s/p typical CTI dependent A flutter ablation:   Tolerated procedure well. No acute complication noted.  No new sustained events on tele overnight. She had rare Apcs.   Prn tylenol .  Resume xeralto   L CFV access - hemostasis with manual pressure. Access site no hematoma, soft NT  Hold spironolactone for now- resume in clinic as Bp tolerates.  Overall she looks stable and will be d/s home.       Echo : predominantly same as pre procedure:     1 - Severely depressed left ventricular systolic function (EF 25-30%).     2 - Wall motion abnormalities.     3 - Eccentric hypertrophy.     4 - Biatrial enlargement.     5 - Mildly depressed right ventricular systolic function .     6 - Trivial posterior pericardial effusion.     7 - Right pleural effusion.    Final Diagnoses:    Principal Problem: <principal problem not specified>   Secondary Diagnoses:   Active Hospital Problems    Diagnosis  POA    PAF (paroxysmal atrial fibrillation) [I48.0]  Yes    Typical atrial flutter [I48.3]  Yes      Resolved Hospital Problems   No resolved problems to display.       Discharged Condition: stable    Disposition: Home or Self Care    Follow Up/Patient Instructions:     Medications:  Reconciled Home Medications:      Medication List      CHANGE how you take these medications    clopidogrel 75 mg tablet  Commonly known as:  PLAVIX  Take 1 tablet (75 mg total) by mouth once daily.  What changed:  when to take this     potassium chloride 10 MEQ Cpsr  Commonly known as:  MICRO-K  TAKE 1 CAPSULE (10 MEQ TOTAL) BY MOUTH ONCE DAILY.  What changed:    · when to take this  · additional  instructions     rivaroxaban 15 mg Tab  Commonly known as:  XARELTO  Take 1 tablet (15 mg total) by mouth 2 (two) times daily with meals. 15 mg PO BID with meals x 21 days, then 20 mg PO daily with dinner  What changed:    · how much to take  · when to take this  · additional instructions     rosuvastatin 20 MG tablet  Commonly known as:  CRESTOR  Take 1 tablet (20 mg total) by mouth every evening.  What changed:  how much to take        CONTINUE taking these medications    acyclovir 5% 5 % Crea  Commonly known as:  ZOVIRAX  5 x per day     cranberry 400 mg Cap  Take 1 capsule by mouth.     furosemide 20 MG tablet  Commonly known as:  LASIX  TAKE 1 TABLET BY MOUTH EVERY DAY     pantoprazole 40 MG tablet  Commonly known as:  PROTONIX  Take 1 tablet (40 mg total) by mouth once daily.     PRESERVISION AREDS-2 006-133-08-1 mg-unit-mg-mg Cap  Generic drug:  vit C,R-Yi-opszr-lutein-zeaxan  Take by mouth 2 (two) times daily.     sacubitril-valsartan 24-26 mg per tablet  Commonly known as:  ENTRESTO  Take 1 tablet by mouth 2 (two) times daily.     sertraline 25 MG tablet  Commonly known as:  ZOLOFT  Take 25 mg by mouth 2 (two) times daily.     TOPROL XL 25 MG 24 hr tablet  Generic drug:  metoprolol succinate  Take 12.5 mg by mouth 2 (two) times daily.     VITAMIN D2 50,000 unit Cap  Generic drug:  ergocalciferol  TAKE 1 CAPSULE BY MOUTH THREE TIMES A WEEK        STOP taking these medications    spironolactone 25 MG tablet  Commonly known as:  ALDACTONE          Discharge Procedure Orders   Diet Cardiac     No driving until:     Notify your health care provider if you experience any of the following:  temperature >100.4     Notify your health care provider if you experience any of the following:  persistent nausea and vomiting or diarrhea     Notify your health care provider if you experience any of the following:  severe uncontrolled pain     Notify your health care provider if you experience any of the following:   difficulty breathing or increased cough     Notify your health care provider if you experience any of the following:  severe persistent headache     Notify your health care provider if you experience any of the following:  worsening rash     Notify your health care provider if you experience any of the following:  persistent dizziness, light-headedness, or visual disturbances     No dressing needed     Follow-up Information     Bonilla Fitzgerald MD In 6 weeks.    Specialties:  Electrophysiology, Cardiology  Why:  post atrial flutter   Contact information:  [facility] [address]  [facility] [address]  [phone]

## 2018-10-29 ENCOUNTER — TELEPHONE (OUTPATIENT)
Dept: ELECTROPHYSIOLOGY | Facility: CLINIC | Age: 78
End: 2018-10-29

## 2018-10-29 DIAGNOSIS — I48.0 PAROXYSMAL ATRIAL FIBRILLATION: Primary | ICD-10-CM

## 2018-10-29 NOTE — TELEPHONE ENCOUNTER
Discussed earlier message with Dr Fitzgerald. He wants patient to have EKG. Spoke with patient and scheduled EKG for tomorrow morning at 9am in the [place] office. She verbalized understanding. Will call her after Dr Fitzgerald reviews the EKG and makes recommendations.

## 2018-10-29 NOTE — TELEPHONE ENCOUNTER
Spoke with patient. She had AFl ablation (typical) on 10/26/18. States that as long as she is not moving she feels fine. Any exertion makes her feel like her heart starts racing and she gets SOB until it slows down again. Will discuss with DR Fitzgerald and call her back with his recommendations. Denies being in acute distress, states she has felt like this since she got home.

## 2018-10-29 NOTE — TELEPHONE ENCOUNTER
"----- Message from Nena Lopez MA sent at 10/29/2018  2:54 PM CDT -----  Contact: Patient      ----- Message -----  From: Deuce Paredes  Sent: 10/29/2018   2:37 PM  To: [facility] Staff    Type: Needs Medical Advice    Who Called:  Patient  Best Call Back Number: [phone]   Additional Information: Patient had ablation on Friday 10/26/18 and having problems with breathing. "Shortness of breath". Patient would like to know if she needs to be seen.  "

## 2018-10-30 ENCOUNTER — CLINICAL SUPPORT (OUTPATIENT)
Dept: CARDIOLOGY | Facility: CLINIC | Age: 78
End: 2018-10-30
Payer: MEDICARE

## 2018-10-30 ENCOUNTER — TELEPHONE (OUTPATIENT)
Dept: ELECTROPHYSIOLOGY | Facility: CLINIC | Age: 78
End: 2018-10-30

## 2018-10-30 DIAGNOSIS — I48.0 PAROXYSMAL ATRIAL FIBRILLATION: ICD-10-CM

## 2018-10-30 PROCEDURE — 93010 ELECTROCARDIOGRAM REPORT: CPT | Mod: S$PBB,,, | Performed by: INTERNAL MEDICINE

## 2018-10-30 PROCEDURE — 93005 ELECTROCARDIOGRAM TRACING: CPT | Mod: PBBFAC,PO | Performed by: INTERNAL MEDICINE

## 2018-10-30 NOTE — TELEPHONE ENCOUNTER
Follow up of yesterday's message. Patient had EKG done today in [place]. It showed NSR with HR=79. Spoke with patient and advised her that I spoke with Dr Fitzgerald yesterday and he recommended that if EKG showed NSR, she should follow up with primary cardiology or primary care for her sob. She verbalizes understanding and actually has appts with both on 11/12/18 and 11/16/18. Advised to reach out to us for any other questions or concerns.

## 2018-11-12 ENCOUNTER — OFFICE VISIT (OUTPATIENT)
Dept: CARDIOLOGY | Facility: CLINIC | Age: 78
End: 2018-11-12
Payer: MEDICARE

## 2018-11-12 VITALS
BODY MASS INDEX: 26.19 KG/M2 | SYSTOLIC BLOOD PRESSURE: 106 MMHG | HEIGHT: 65 IN | WEIGHT: 157.19 LBS | DIASTOLIC BLOOD PRESSURE: 60 MMHG | HEART RATE: 77 BPM

## 2018-11-12 DIAGNOSIS — I48.3 TYPICAL ATRIAL FLUTTER: ICD-10-CM

## 2018-11-12 DIAGNOSIS — I25.10 CORONARY ARTERY DISEASE INVOLVING NATIVE CORONARY ARTERY OF NATIVE HEART WITHOUT ANGINA PECTORIS: ICD-10-CM

## 2018-11-12 DIAGNOSIS — Z95.1 S/P CABG (CORONARY ARTERY BYPASS GRAFT): ICD-10-CM

## 2018-11-12 DIAGNOSIS — I42.0 ISCHEMIC DILATED CARDIOMYOPATHY: Primary | ICD-10-CM

## 2018-11-12 DIAGNOSIS — I25.5 ISCHEMIC DILATED CARDIOMYOPATHY: Primary | ICD-10-CM

## 2018-11-12 PROCEDURE — 3078F DIAST BP <80 MM HG: CPT | Mod: S$GLB,,, | Performed by: INTERNAL MEDICINE

## 2018-11-12 PROCEDURE — 1101F PT FALLS ASSESS-DOCD LE1/YR: CPT | Mod: S$GLB,,, | Performed by: INTERNAL MEDICINE

## 2018-11-12 PROCEDURE — 99214 OFFICE O/P EST MOD 30 MIN: CPT | Mod: S$GLB,,, | Performed by: INTERNAL MEDICINE

## 2018-11-12 PROCEDURE — 3074F SYST BP LT 130 MM HG: CPT | Mod: S$GLB,,, | Performed by: INTERNAL MEDICINE

## 2018-11-12 PROCEDURE — 99999 PR PBB SHADOW E&M-EST. PATIENT-LVL III: CPT | Mod: PBBFAC,,, | Performed by: INTERNAL MEDICINE

## 2018-11-12 RX ORDER — SPIRONOLACTONE 25 MG/1
25 TABLET ORAL DAILY
Qty: 30 TABLET | Refills: 5 | Status: SHIPPED | OUTPATIENT
Start: 2018-11-12 | End: 2019-01-29 | Stop reason: SDUPTHER

## 2018-11-12 RX ORDER — SPIRONOLACTONE 25 MG/1
25 TABLET ORAL DAILY
COMMUNITY
End: 2018-11-12 | Stop reason: SDUPTHER

## 2018-11-12 NOTE — PROGRESS NOTES
Subjective:    Patient ID:  Sari Biswas is a 78 y.o. female who presents for follow-up of ICM    HPI  Had RFA for atrial flutter 2 weeks ago  Slowly getting better  Still with moderate SOB    Review of Systems   Constitution: Negative for decreased appetite, weakness, malaise/fatigue, weight gain and weight loss.   Cardiovascular: Positive for dyspnea on exertion. Negative for chest pain, leg swelling, palpitations and syncope.   Respiratory: Negative for cough and shortness of breath.    Gastrointestinal: Negative.    All other systems reviewed and are negative.       Objective:      Physical Exam   Constitutional: She is oriented to person, place, and time. She appears well-developed and well-nourished.   HENT:   Head: Normocephalic.   Eyes: Pupils are equal, round, and reactive to light.   Neck: Normal range of motion. Neck supple. No JVD present. Carotid bruit is not present. No thyromegaly present.   Cardiovascular: Normal rate, regular rhythm, normal heart sounds, intact distal pulses and normal pulses. PMI is not displaced. Exam reveals no gallop.   No murmur heard.  Pulmonary/Chest: Effort normal and breath sounds normal.   Abdominal: Soft. Normal appearance. She exhibits no mass. There is no hepatosplenomegaly. There is no tenderness.   Musculoskeletal: Normal range of motion. She exhibits no edema.   Neurological: She is alert and oriented to person, place, and time. She has normal strength and normal reflexes. No sensory deficit.   Skin: Skin is warm and intact.   Psychiatric: She has a normal mood and affect.   Nursing note and vitals reviewed.        Assessment:       1. Ischemic dilated cardiomyopathy    2. Typical atrial flutter    3. Coronary artery disease involving native coronary artery of native heart without angina pectoris    4. S/P CABG (coronary artery bypass graft)         Plan:     6 week f/u with [facility], labs

## 2018-11-13 ENCOUNTER — TELEPHONE (OUTPATIENT)
Dept: CARDIOLOGY | Facility: CLINIC | Age: 78
End: 2018-11-13

## 2018-11-14 ENCOUNTER — TELEPHONE (OUTPATIENT)
Dept: CARDIOLOGY | Facility: CLINIC | Age: 78
End: 2018-11-14

## 2018-11-14 NOTE — TELEPHONE ENCOUNTER
----- Message from Sari Gamez sent at 11/14/2018 11:23 AM CST -----  Type: Needs Medical Advice    Who Called:  Patient  Best Call Back Number: [phone]  Additional Information: Patient is calling regarding paperwork for a life vest--she said she spoke with someone yesterday at the [employer] and they sent it out electronically on 11/8/18 and we didn't have the paperwork yet so she is calling today to see if we got it.     Also, her pulmonologist wanted office to okay getting off of some of her medications before procedure. Please call to advise.

## 2018-11-15 NOTE — TELEPHONE ENCOUNTER
----- Message from Sana Mckinley sent at 11/15/2018  1:30 PM CST -----  Contact: Sari  Type: Needs Medical Advice    Who Called:  patient  Best Call Back Number: [phone]  Additional Information:  Following up to check if received fax from [employer]. Thanks!

## 2018-11-16 ENCOUNTER — OFFICE VISIT (OUTPATIENT)
Dept: FAMILY MEDICINE | Facility: CLINIC | Age: 78
End: 2018-11-16
Payer: MEDICARE

## 2018-11-16 VITALS
TEMPERATURE: 98 F | WEIGHT: 154.56 LBS | BODY MASS INDEX: 25.75 KG/M2 | HEIGHT: 65 IN | DIASTOLIC BLOOD PRESSURE: 58 MMHG | SYSTOLIC BLOOD PRESSURE: 108 MMHG | OXYGEN SATURATION: 96 % | HEART RATE: 76 BPM

## 2018-11-16 DIAGNOSIS — I10 ESSENTIAL HYPERTENSION: ICD-10-CM

## 2018-11-16 DIAGNOSIS — I25.10 CORONARY ARTERY DISEASE, ANGINA PRESENCE UNSPECIFIED, UNSPECIFIED VESSEL OR LESION TYPE, UNSPECIFIED WHETHER NATIVE OR TRANSPLANTED HEART: ICD-10-CM

## 2018-11-16 DIAGNOSIS — J90 PLEURAL EFFUSION: ICD-10-CM

## 2018-11-16 DIAGNOSIS — I48.20 CHRONIC ATRIAL FIBRILLATION: Primary | ICD-10-CM

## 2018-11-16 PROCEDURE — 1101F PT FALLS ASSESS-DOCD LE1/YR: CPT | Mod: S$GLB,,, | Performed by: FAMILY MEDICINE

## 2018-11-16 PROCEDURE — G0008 ADMIN INFLUENZA VIRUS VAC: HCPCS | Mod: S$GLB,,, | Performed by: FAMILY MEDICINE

## 2018-11-16 PROCEDURE — 90662 IIV NO PRSV INCREASED AG IM: CPT | Mod: S$GLB,,, | Performed by: FAMILY MEDICINE

## 2018-11-16 PROCEDURE — 99999 PR PBB SHADOW E&M-EST. PATIENT-LVL III: CPT | Mod: PBBFAC,,, | Performed by: FAMILY MEDICINE

## 2018-11-16 PROCEDURE — 99214 OFFICE O/P EST MOD 30 MIN: CPT | Mod: 25,S$GLB,, | Performed by: FAMILY MEDICINE

## 2018-11-16 PROCEDURE — 3074F SYST BP LT 130 MM HG: CPT | Mod: S$GLB,,, | Performed by: FAMILY MEDICINE

## 2018-11-16 PROCEDURE — 3078F DIAST BP <80 MM HG: CPT | Mod: S$GLB,,, | Performed by: FAMILY MEDICINE

## 2018-11-16 RX ORDER — CLOBETASOL PROPIONATE 0.5 MG/G
CREAM TOPICAL 2 TIMES DAILY
Qty: 60 G | Refills: 5 | Status: SHIPPED | OUTPATIENT
Start: 2018-11-16 | End: 2023-05-29

## 2018-11-16 NOTE — PROGRESS NOTES
Subjective:       Patient ID: Sari Biswas is a 78 y.o. female.    Chief Complaint: Follow-up (2 month f/u)    HPI     Here for a f/u.     S/p rfa for a flutter approx 4 weeks ago.      Saw Dr. Jang, pulmonologist, approx 2 weeks ago.  Found to have bilat pleural effusion with right > left.  Will get thoracentesis next week.  Taking oxygen prn for sob on exertion.     Currently, has life vest for defibrillation.  Reports that her cardiologist in 1/2019 will decide whether she needs a pacemaker/defibrillator.     On occasion, gets dry skin. Requesting rf of temovate.      Review of Systems      Review of Systems   Constitutional: Negative for fever and chills.   HENT: Negative for hearing loss and neck stiffness.    Eyes: Negative for redness and itching.   Respiratory: Negative for cough and choking.    Cardiovascular: Negative for chest pain and leg swelling.  Abdomen: Negative for abdominal pain and blood in stool.   Genitourinary: Negative for dysuria and flank pain.   Musculoskeletal: Negative for back pain and gait problem.   Neurological: Negative for light-headedness and headaches.   Hematological: Negative for adenopathy.   Psychiatric/Behavioral: Negative for behavioral problems.       Objective:      Physical Exam   Constitutional: She appears well-developed.   HENT:   Head: Normocephalic and atraumatic.   Eyes: Conjunctivae are normal. Pupils are equal, round, and reactive to light.   Neck: Normal range of motion.   Cardiovascular: Normal rate and regular rhythm.   No murmur heard.  Pulmonary/Chest: Effort normal.   Dec bs bilat lung bases.    Lymphadenopathy:     She has no cervical adenopathy.       Assessment:       1. Chronic atrial fibrillation    2. Coronary artery disease, angina presence unspecified, unspecified vessel or lesion type, unspecified whether native or transplanted heart    3. Essential hypertension    4. Pleural effusion        Plan:       Chronic atrial  fibrillation    Coronary artery disease, angina presence unspecified, unspecified vessel or lesion type, unspecified whether native or transplanted heart    Essential hypertension    Pleural effusion    Other orders  -     clobetasol (TEMOVATE) 0.05 % cream; Apply topically 2 (two) times daily.  Dispense: 60 g; Refill: 5  -     Influenza - High Dose (65+) (PF) (IM)              Plan:  Cont current meds  F/u with pulmonology and cardiology.        Medication List           Accurate as of 11/16/18 10:26 AM. If you have any questions, ask your nurse or doctor.               START taking these medications    clobetasol 0.05 % cream  Commonly known as:  TEMOVATE  Apply topically 2 (two) times daily.  Started by:  Turner Anne MD        CHANGE how you take these medications    clopidogrel 75 mg tablet  Commonly known as:  PLAVIX  Take 1 tablet (75 mg total) by mouth once daily.  What changed:  when to take this     potassium chloride 10 MEQ Cpsr  Commonly known as:  MICRO-K  TAKE 1 CAPSULE (10 MEQ TOTAL) BY MOUTH ONCE DAILY.  What changed:    · when to take this  · additional instructions     rivaroxaban 15 mg Tab  Commonly known as:  XARELTO  Take 1 tablet (15 mg total) by mouth 2 (two) times daily with meals. 15 mg PO BID with meals x 21 days, then 20 mg PO daily with dinner  What changed:    · how much to take  · when to take this  · additional instructions     rosuvastatin 20 MG tablet  Commonly known as:  CRESTOR  Take 1 tablet (20 mg total) by mouth every evening.  What changed:  how much to take        CONTINUE taking these medications    acetaminophen 325 MG tablet  Commonly known as:  TYLENOL  Take 2 tablets (650 mg total) by mouth every 6 (six) hours as needed.     cranberry 400 mg Cap     furosemide 20 MG tablet  Commonly known as:  LASIX  TAKE 1 TABLET BY MOUTH EVERY DAY     pantoprazole 40 MG tablet  Commonly known as:  PROTONIX  Take 1 tablet (40 mg total) by mouth once daily.     PRESERVISION AREDS-2  [phone] mg-unit-mg-mg Cap  Generic drug:  vit C,M-Lm-ibxll-lutein-zeaxan     sacubitril-valsartan 24-26 mg per tablet  Commonly known as:  ENTRESTO  Take 1 tablet by mouth 2 (two) times daily.     sertraline 25 MG tablet  Commonly known as:  ZOLOFT     spironolactone 25 MG tablet  Commonly known as:  ALDACTONE  Take 1 tablet (25 mg total) by mouth once daily.     TOPROL XL 25 MG 24 hr tablet  Generic drug:  metoprolol succinate     VITAMIN D2 50,000 unit Cap  Generic drug:  ergocalciferol        STOP taking these medications    acyclovir 5% 5 % Crea  Commonly known as:  ZOVIRAX  Stopped by:  Turner Anne MD           Where to Get Your Medications      These medications were sent to [facility]/pharmacy #[id number] - [place] - [address]  [address] [address]    Phone:  [phone]   · clobetasol 0.05 % cream

## 2018-11-23 ENCOUNTER — TELEPHONE (OUTPATIENT)
Dept: CARDIOLOGY | Facility: CLINIC | Age: 78
End: 2018-11-23

## 2018-11-23 NOTE — TELEPHONE ENCOUNTER
----- Message from Vickie Woodson sent at 11/23/2018  2:03 PM CST -----  Type:Requesting to speak with nurse    Name of Caller:  Deonte with [employer]  What do they need to clarify?:  Medical record information  Best Call Back Number:  [phone] ext [phone]  Additional Information:  Wanted doctor's office (not medical records department)

## 2018-11-26 ENCOUNTER — TELEPHONE (OUTPATIENT)
Dept: CARDIOLOGY | Facility: CLINIC | Age: 78
End: 2018-11-26

## 2018-11-26 NOTE — TELEPHONE ENCOUNTER
Attempted to call pt lm. Attempted  to call Stephanie SPARKS with [employer] regarding the pt life vest lm for both

## 2018-11-26 NOTE — TELEPHONE ENCOUNTER
----- Message from Vickie Woodson sent at 11/26/2018  3:24 PM CST -----  Type: Needs Medical Advice    Who Called: Patient  Best Call Back Number: [phone]  Additional Information: Patient needs to know if nurse (Mariam)contacted company [employer] (maker of lifevest)please call back to advise.

## 2018-11-30 ENCOUNTER — PES CALL (OUTPATIENT)
Dept: ADMINISTRATIVE | Facility: CLINIC | Age: 78
End: 2018-11-30

## 2018-12-14 ENCOUNTER — LAB VISIT (OUTPATIENT)
Dept: LAB | Facility: HOSPITAL | Age: 78
End: 2018-12-14
Attending: INTERNAL MEDICINE
Payer: MEDICARE

## 2018-12-14 ENCOUNTER — CLINICAL SUPPORT (OUTPATIENT)
Dept: CARDIOLOGY | Facility: CLINIC | Age: 78
End: 2018-12-14
Attending: INTERNAL MEDICINE
Payer: MEDICARE

## 2018-12-14 VITALS
WEIGHT: 154 LBS | SYSTOLIC BLOOD PRESSURE: 118 MMHG | HEART RATE: 65 BPM | HEIGHT: 65 IN | BODY MASS INDEX: 25.66 KG/M2 | DIASTOLIC BLOOD PRESSURE: 60 MMHG

## 2018-12-14 DIAGNOSIS — I25.10 CORONARY ARTERY DISEASE INVOLVING NATIVE CORONARY ARTERY OF NATIVE HEART WITHOUT ANGINA PECTORIS: ICD-10-CM

## 2018-12-14 DIAGNOSIS — Z95.1 S/P CABG (CORONARY ARTERY BYPASS GRAFT): ICD-10-CM

## 2018-12-14 DIAGNOSIS — I42.0 ISCHEMIC DILATED CARDIOMYOPATHY: ICD-10-CM

## 2018-12-14 DIAGNOSIS — I25.5 ISCHEMIC DILATED CARDIOMYOPATHY: ICD-10-CM

## 2018-12-14 DIAGNOSIS — I48.3 TYPICAL ATRIAL FLUTTER: ICD-10-CM

## 2018-12-14 LAB
ALBUMIN SERPL BCP-MCNC: 3.4 G/DL
ALP SERPL-CCNC: 98 U/L
ALT SERPL W/O P-5'-P-CCNC: 20 U/L
ANION GAP SERPL CALC-SCNC: 8 MMOL/L
AST SERPL-CCNC: 23 U/L
BILIRUB DIRECT SERPL-MCNC: 0.3 MG/DL
BILIRUB SERPL-MCNC: 0.6 MG/DL
BNP SERPL-MCNC: 879 PG/ML
BUN SERPL-MCNC: 18 MG/DL
CALCIUM SERPL-MCNC: 9.4 MG/DL
CHLORIDE SERPL-SCNC: 107 MMOL/L
CHOLEST SERPL-MCNC: 167 MG/DL
CHOLEST/HDLC SERPL: 3.3 {RATIO}
CO2 SERPL-SCNC: 27 MMOL/L
CREAT SERPL-MCNC: 0.9 MG/DL
EST. GFR  (AFRICAN AMERICAN): >60 ML/MIN/1.73 M^2
EST. GFR  (NON AFRICAN AMERICAN): >60 ML/MIN/1.73 M^2
GLUCOSE SERPL-MCNC: 97 MG/DL
HDLC SERPL-MCNC: 51 MG/DL
HDLC SERPL: 30.5 %
LDLC SERPL CALC-MCNC: 92.2 MG/DL
NONHDLC SERPL-MCNC: 116 MG/DL
POTASSIUM SERPL-SCNC: 4.9 MMOL/L
PROT SERPL-MCNC: 6.8 G/DL
SODIUM SERPL-SCNC: 142 MMOL/L
TRIGL SERPL-MCNC: 119 MG/DL

## 2018-12-14 PROCEDURE — 36415 COLL VENOUS BLD VENIPUNCTURE: CPT | Mod: PO

## 2018-12-14 PROCEDURE — 80076 HEPATIC FUNCTION PANEL: CPT

## 2018-12-14 PROCEDURE — 93306 TTE W/DOPPLER COMPLETE: CPT | Mod: S$GLB,,, | Performed by: INTERNAL MEDICINE

## 2018-12-14 PROCEDURE — 80048 BASIC METABOLIC PNL TOTAL CA: CPT

## 2018-12-14 PROCEDURE — 83880 ASSAY OF NATRIURETIC PEPTIDE: CPT

## 2018-12-14 PROCEDURE — 80061 LIPID PANEL: CPT

## 2018-12-14 PROCEDURE — 99999 PR PBB SHADOW E&M-EST. PATIENT-LVL II: CPT | Mod: PBBFAC,,,

## 2018-12-17 LAB
ASCENDING AORTA: 2.58 CM
AV INDEX (PROSTH): 0.81
AV MEAN GRADIENT: 2.6 MMHG
AV PEAK GRADIENT: 4.33 MMHG
AV VALVE AREA: 2.48 CM2
BSA FOR ECHO PROCEDURE: 1.79 M2
CV ECHO LV RWT: 0.26 CM
DOP CALC AO PEAK VEL: 1.04 M/S
DOP CALC AO VTI: 16.61 CM
DOP CALC LVOT AREA: 3.08 CM2
DOP CALC LVOT DIAMETER: 1.98 CM
DOP CALC LVOT STROKE VOLUME: 41.27 CM3
DOP CALCLVOT PEAK VEL VTI: 13.41 CM
E WAVE DECELERATION TIME: 148.8 MSEC
E/A RATIO: 2.53
E/E' RATIO: 16.29
ECHO LV POSTERIOR WALL: 0.74 CM (ref 0.6–1.1)
FRACTIONAL SHORTENING: 16 % (ref 28–44)
INTERVENTRICULAR SEPTUM: 0.8 CM (ref 0.6–1.1)
IVRT: 0.08 MSEC
LA MAJOR: 5.4 CM
LA MINOR: 5.5 CM
LA WIDTH: 4.8 CM
LEFT ATRIUM SIZE: 3.89 CM
LEFT ATRIUM VOLUME INDEX: 48.9 ML/M2
LEFT ATRIUM VOLUME: 86.49 CM3
LEFT INTERNAL DIMENSION IN SYSTOLE: 4.72 CM (ref 2.1–4)
LEFT VENTRICLE DIASTOLIC VOLUME INDEX: 87.24 ML/M2
LEFT VENTRICLE DIASTOLIC VOLUME: 154.42 ML
LEFT VENTRICLE MASS INDEX: 89.2 G/M2
LEFT VENTRICLE SYSTOLIC VOLUME INDEX: 58.5 ML/M2
LEFT VENTRICLE SYSTOLIC VOLUME: 103.47 ML
LEFT VENTRICULAR INTERNAL DIMENSION IN DIASTOLE: 5.61 CM (ref 3.5–6)
LEFT VENTRICULAR MASS: 157.82 G
LV LATERAL E/E' RATIO: 12.67
LV SEPTAL E/E' RATIO: 22.8
MV PEAK A VEL: 0.45 M/S
MV PEAK E VEL: 1.14 M/S
PISA TR MAX VEL: 3.64 M/S
PULM VEIN S/D RATIO: 0.67
PV PEAK D VEL: 0.63 M/S
PV PEAK S VEL: 0.42 M/S
RA MAJOR: 4.92 CM
RA PRESSURE: 3 MMHG
RA WIDTH: 3.69 CM
RIGHT VENTRICULAR END-DIASTOLIC DIMENSION: 3.86 CM
RV TISSUE DOPPLER FREE WALL SYSTOLIC VELOCITY 1 (APICAL 4 CHAMBER VIEW): 7.52 M/S
SINUS: 2.7 CM
STJ: 2.51 CM
TDI LATERAL: 0.09
TDI SEPTAL: 0.05
TDI: 0.07
TR MAX PG: 53 MMHG
TRICUSPID ANNULAR PLANE SYSTOLIC EXCURSION: 1.33 CM
TV REST PULMONARY ARTERY PRESSURE: 56 MMHG

## 2018-12-17 NOTE — TELEPHONE ENCOUNTER
----- Message from Maci Manzanares sent at 12/17/2018  8:18 AM CST -----  Type:  RX Refill Request    Who Called:  Self Refill or New Rx:  Refill   RX Name and Strength:  sacubitril-valsartan (ENTRESTO) 24-26 mg per tablet  How is the patient currently taking it? (ex. 1XDay):  2  x day Is this a 30 day or 90 day RX:  30 day supply  Preferred Pharmacy with phone number:  [facility] or Mail Order:  Local   Ordering Provider:  Dr Areli Honeycutt Call Back Number:  [phone]  Additional Information:  Patient called asking for an update for rx refill. Patient is out of the medication.

## 2018-12-17 NOTE — TELEPHONE ENCOUNTER
----- Message from Maci Manzanares sent at 12/17/2018  8:18 AM CST -----  Type:  RX Refill Request    Who Called:  Self Refill or New Rx:  Refill   RX Name and Strength:  sacubitril-valsartan (ENTRESTO) 24-26 mg per tablet  How is the patient currently taking it? (ex. 1XDay):  2  x day Is this a 30 day or 90 day RX:  30 day supply  Preferred Pharmacy with phone number:  [facility] or Mail Order:  Local   Ordering Provider:  Dr Areli Honeycutt Call Back Number:  [phone]  Additional Information:  Patient called asking for an update for rx refill. Patient is out of the medication.

## 2018-12-17 NOTE — TELEPHONE ENCOUNTER
----- Message from Gloria Chappell sent at 12/17/2018  1:36 PM CST -----  Contact: Self  Type:  RX Refill Request    Who Called: Patient  Refill or New Rx:  New RX  RX Name and Strength: sacubitril-valsartan (ENTRESTO) 24-26 mg per tablet  How is the patient currently taking it? (ex. 1XDay):  2XDay  Is this a 30 day or 90 day RX: 90  Preferred Pharmacy with phone number:   [id number] [place] - [address]  [address]  [place] [address]  Phone: [phone] Fax: [phone]  Local or Mail Order: local  Ordering Provider: Last ordered by PA at [facility] but Dr Mina told her he will follow  Best Call Back Number:  [phone] (home)   Additional Information:  She is now out of medicine [facility] started faxing over on 12/8

## 2018-12-21 ENCOUNTER — CLINICAL SUPPORT (OUTPATIENT)
Dept: CARDIOLOGY | Facility: CLINIC | Age: 78
End: 2018-12-21
Attending: INTERNAL MEDICINE
Payer: MEDICARE

## 2018-12-21 ENCOUNTER — OFFICE VISIT (OUTPATIENT)
Dept: CARDIOLOGY | Facility: CLINIC | Age: 78
End: 2018-12-21
Payer: MEDICARE

## 2018-12-21 VITALS
HEIGHT: 65 IN | BODY MASS INDEX: 25.38 KG/M2 | DIASTOLIC BLOOD PRESSURE: 65 MMHG | WEIGHT: 152.31 LBS | SYSTOLIC BLOOD PRESSURE: 106 MMHG | HEART RATE: 65 BPM

## 2018-12-21 DIAGNOSIS — I25.110 CORONARY ARTERY DISEASE INVOLVING NATIVE CORONARY ARTERY OF NATIVE HEART WITH UNSTABLE ANGINA PECTORIS: ICD-10-CM

## 2018-12-21 DIAGNOSIS — I42.0 ISCHEMIC DILATED CARDIOMYOPATHY: ICD-10-CM

## 2018-12-21 DIAGNOSIS — E78.5 HYPERLIPIDEMIA, UNSPECIFIED HYPERLIPIDEMIA TYPE: ICD-10-CM

## 2018-12-21 DIAGNOSIS — I48.0 PAROXYSMAL ATRIAL FIBRILLATION: ICD-10-CM

## 2018-12-21 DIAGNOSIS — I25.5 ISCHEMIC DILATED CARDIOMYOPATHY: ICD-10-CM

## 2018-12-21 DIAGNOSIS — I82.722 ARM DVT (DEEP VENOUS THROMBOEMBOLISM), CHRONIC, LEFT: Primary | ICD-10-CM

## 2018-12-21 DIAGNOSIS — I82.722 ARM DVT (DEEP VENOUS THROMBOEMBOLISM), CHRONIC, LEFT: ICD-10-CM

## 2018-12-21 DIAGNOSIS — I25.10 CORONARY ARTERY DISEASE INVOLVING NATIVE CORONARY ARTERY OF NATIVE HEART WITHOUT ANGINA PECTORIS: ICD-10-CM

## 2018-12-21 DIAGNOSIS — Z95.1 S/P CABG (CORONARY ARTERY BYPASS GRAFT): ICD-10-CM

## 2018-12-21 PROCEDURE — 93971 EXTREMITY STUDY: CPT | Mod: LT,S$GLB,, | Performed by: INTERNAL MEDICINE

## 2018-12-21 PROCEDURE — 99999 PR PBB SHADOW E&M-EST. PATIENT-LVL III: CPT | Mod: PBBFAC,,, | Performed by: INTERNAL MEDICINE

## 2018-12-21 PROCEDURE — 99214 OFFICE O/P EST MOD 30 MIN: CPT | Mod: S$GLB,,, | Performed by: INTERNAL MEDICINE

## 2018-12-21 PROCEDURE — 3074F SYST BP LT 130 MM HG: CPT | Mod: S$GLB,,, | Performed by: INTERNAL MEDICINE

## 2018-12-21 PROCEDURE — 3078F DIAST BP <80 MM HG: CPT | Mod: S$GLB,,, | Performed by: INTERNAL MEDICINE

## 2018-12-21 PROCEDURE — 1101F PT FALLS ASSESS-DOCD LE1/YR: CPT | Mod: S$GLB,,, | Performed by: INTERNAL MEDICINE

## 2018-12-21 NOTE — PROGRESS NOTES
Subjective:    Patient ID:  Sari Biswas is a 78 y.o. female who presents for follow-up of ICM    HPI  She comes with no complaints, no chest pain, no shortness of breath  FC II    Review of Systems   Constitution: Negative for decreased appetite, weakness, malaise/fatigue, weight gain and weight loss.   Cardiovascular: Negative for chest pain, dyspnea on exertion, leg swelling, palpitations and syncope.   Respiratory: Negative for cough and shortness of breath.    Gastrointestinal: Negative.    All other systems reviewed and are negative.       Objective:      Physical Exam   Constitutional: She is oriented to person, place, and time. She appears well-developed and well-nourished.   HENT:   Head: Normocephalic.   Eyes: Pupils are equal, round, and reactive to light.   Neck: Normal range of motion. Neck supple. No JVD present. Carotid bruit is not present. No thyromegaly present.   Cardiovascular: Normal rate, regular rhythm, normal heart sounds, intact distal pulses and normal pulses. PMI is not displaced. Exam reveals no gallop.   No murmur heard.  Pulmonary/Chest: Effort normal and breath sounds normal.   Abdominal: Soft. Normal appearance. She exhibits no mass. There is no hepatosplenomegaly. There is no tenderness.   Musculoskeletal: Normal range of motion. She exhibits no edema.   Neurological: She is alert and oriented to person, place, and time. She has normal strength and normal reflexes. No sensory deficit.   Skin: Skin is warm and intact.   Psychiatric: She has a normal mood and affect.   Nursing note and vitals reviewed.        Assessment:       1. Arm DVT (deep venous thromboembolism), chronic, left    2. Coronary artery disease involving native coronary artery of native heart without angina pectoris    3. Coronary artery disease involving native coronary artery of native heart with unstable angina pectoris    4. Hyperlipidemia, unspecified hyperlipidemia type    5. Paroxysmal atrial fibrillation     6. Ischemic dilated cardiomyopathy    7. S/P CABG (coronary artery bypass graft)         Plan:   Venous doppler left arm, if ok will schedule ICD and dc life-vest  Continue all cardiac medications  Regular exercise program  3 m f/u

## 2018-12-31 ENCOUNTER — TELEPHONE (OUTPATIENT)
Dept: CARDIOLOGY | Facility: CLINIC | Age: 78
End: 2018-12-31

## 2018-12-31 NOTE — TELEPHONE ENCOUNTER
----- Message from Sari Gamez sent at 12/31/2018  9:26 AM CST -----  Type: Needs Medical Advice    Who Called:  Patient  Best Call Back Number: [phone].  Additional Information: Needs to know if a decision has been made on her procedure. Please call to advise.     She also wanted to let the office know that the samples of rivaroxaban (XARELTO) 15 mg Tab . She states that office gave her 15 mg and she states she takes the 20 mg. (15mg was in her file.) She states she can come and change them out if office has the 20 mg there. Please advise.

## 2019-01-02 DIAGNOSIS — I25.5 ISCHEMIC DILATED CARDIOMYOPATHY: Primary | ICD-10-CM

## 2019-01-02 DIAGNOSIS — I25.10 CORONARY ARTERY DISEASE, ANGINA PRESENCE UNSPECIFIED, UNSPECIFIED VESSEL OR LESION TYPE, UNSPECIFIED WHETHER NATIVE OR TRANSPLANTED HEART: ICD-10-CM

## 2019-01-02 DIAGNOSIS — I42.0 ISCHEMIC DILATED CARDIOMYOPATHY: Primary | ICD-10-CM

## 2019-01-02 DIAGNOSIS — I48.0 PAF (PAROXYSMAL ATRIAL FIBRILLATION): ICD-10-CM

## 2019-01-03 RX ORDER — RIVAROXABAN 20 MG/1
TABLET, FILM COATED ORAL
Qty: 30 TABLET | Refills: 3 | Status: SHIPPED | OUTPATIENT
Start: 2019-01-03 | End: 2019-01-07

## 2019-01-06 NOTE — PROGRESS NOTES
Subjective:    Patient ID:  Sari Biswas is a 78 y.o. female who presents for follow-up of Follow-up and Shortness of Breath      HPI 77 yo female with atrial fibrillation, atrial flutter, CAD s/p CABG, ischemic cardiomyopathy, CHF.  Primary cardiologist at this time is Dr. Singleton.  Underwent high risk PCI by Dr. Colon 7/11/18 complicated by LAD perforation and tamponade.  7/11/18 Emergent CABG x 3 + sewing of LAD.  Subsequently had cardiogenic shock with multiple pressors.  Was in atrial fibrillation and atrial flutter during that time.  Placed on amiodarone.    EF was 35% 7/17.  Placed on Lifevest.  Amiodarone discontinued.  Seen by Dr. Singleton.  Noted to be in atrial flutter with cycle length of 270 msec, c/w isthmus dependent atrial flutter.  DCCV 10/8/18.  Amiodarone 200 mg daily resumed at that time.  Echo 10/0818 EF 25%  Notes indicate SVT prior to her surgery.  She reports she had intermittent palpitations that resolved.  Of note, had right femoral hematoma requiring wound care.    RFA 10/26/18 RFA for atrial flutter.  Amiodarone discontinued.  Echo 12/14/18 EF 25%  Still having significant dyspnea on exertion.  Has undergone rt thoracentesis.  Noted improvement after that.    Plan is for dual chamber ICD by Dr. Singleton.    ECG reveals nsr            Review of Systems   Constitution: Negative. Negative for weakness and malaise/fatigue.   Cardiovascular: Negative for chest pain, dyspnea on exertion, irregular heartbeat, leg swelling, near-syncope, orthopnea, palpitations, paroxysmal nocturnal dyspnea and syncope.   Respiratory: Positive for shortness of breath. Negative for cough.    Neurological: Negative for dizziness and light-headedness.   All other systems reviewed and are negative.       Objective:    Physical Exam   Constitutional: She is oriented to person, place, and time. She appears well-developed and well-nourished.   Eyes: Conjunctivae are normal. No scleral icterus.   Neck: No JVD  present. No tracheal deviation present.   Cardiovascular: Normal rate and regular rhythm. PMI is not displaced.   Pulmonary/Chest: Effort normal and breath sounds normal. No respiratory distress.   Abdominal: Soft. There is no hepatosplenomegaly. There is no tenderness.   Musculoskeletal: She exhibits no edema or tenderness.   Neurological: She is alert and oriented to person, place, and time.   Skin: Skin is warm and dry. No rash noted.   Psychiatric: She has a normal mood and affect. Her behavior is normal.         Assessment:       1. Paroxysmal atrial fibrillation    2. Coronary artery disease involving native coronary artery of native heart with unstable angina pectoris    3. Ischemic dilated cardiomyopathy    4. S/P CABG (coronary artery bypass graft)    5. Typical atrial flutter    6. Cardiac tamponade    7. Essential hypertension         Plan:           Doing well s/p RFA for atrial flutter.  Agree with dual chamber ICD.  Would discontinue Xarelto, as her primary arrhythmia was atrial flutter, and she will have an atrial lead for monitoring for AF.  F/u PRN with EP.

## 2019-01-07 ENCOUNTER — OFFICE VISIT (OUTPATIENT)
Dept: CARDIOLOGY | Facility: CLINIC | Age: 79
End: 2019-01-07
Payer: MEDICARE

## 2019-01-07 VITALS
BODY MASS INDEX: 25.97 KG/M2 | HEIGHT: 65 IN | WEIGHT: 155.88 LBS | DIASTOLIC BLOOD PRESSURE: 58 MMHG | SYSTOLIC BLOOD PRESSURE: 98 MMHG | OXYGEN SATURATION: 96 % | HEART RATE: 65 BPM

## 2019-01-07 DIAGNOSIS — I25.5 ISCHEMIC DILATED CARDIOMYOPATHY: ICD-10-CM

## 2019-01-07 DIAGNOSIS — I48.3 TYPICAL ATRIAL FLUTTER: ICD-10-CM

## 2019-01-07 DIAGNOSIS — I31.4 CARDIAC TAMPONADE: ICD-10-CM

## 2019-01-07 DIAGNOSIS — I42.0 ISCHEMIC DILATED CARDIOMYOPATHY: ICD-10-CM

## 2019-01-07 DIAGNOSIS — I25.110 CORONARY ARTERY DISEASE INVOLVING NATIVE CORONARY ARTERY OF NATIVE HEART WITH UNSTABLE ANGINA PECTORIS: ICD-10-CM

## 2019-01-07 DIAGNOSIS — Z00.00 ROUTINE CHECK-UP: ICD-10-CM

## 2019-01-07 DIAGNOSIS — Z95.1 S/P CABG (CORONARY ARTERY BYPASS GRAFT): ICD-10-CM

## 2019-01-07 DIAGNOSIS — I10 ESSENTIAL HYPERTENSION: ICD-10-CM

## 2019-01-07 DIAGNOSIS — I48.0 PAROXYSMAL ATRIAL FIBRILLATION: Primary | ICD-10-CM

## 2019-01-07 PROCEDURE — 99999 PR PBB SHADOW E&M-EST. PATIENT-LVL III: ICD-10-PCS | Mod: PBBFAC,,, | Performed by: INTERNAL MEDICINE

## 2019-01-07 PROCEDURE — 3074F SYST BP LT 130 MM HG: CPT | Mod: S$GLB,,, | Performed by: INTERNAL MEDICINE

## 2019-01-07 PROCEDURE — 1101F PT FALLS ASSESS-DOCD LE1/YR: CPT | Mod: S$GLB,,, | Performed by: INTERNAL MEDICINE

## 2019-01-07 PROCEDURE — 93005 ELECTROCARDIOGRAM TRACING: CPT | Mod: S$GLB,,, | Performed by: INTERNAL MEDICINE

## 2019-01-07 PROCEDURE — 99999 PR PBB SHADOW E&M-EST. PATIENT-LVL III: CPT | Mod: PBBFAC,,, | Performed by: INTERNAL MEDICINE

## 2019-01-07 PROCEDURE — 93010 ELECTROCARDIOGRAM REPORT: CPT | Mod: S$GLB,,, | Performed by: INTERNAL MEDICINE

## 2019-01-07 PROCEDURE — 3074F PR MOST RECENT SYSTOLIC BLOOD PRESSURE < 130 MM HG: ICD-10-PCS | Mod: S$GLB,,, | Performed by: INTERNAL MEDICINE

## 2019-01-07 PROCEDURE — 1101F PR PT FALLS ASSESS DOC 0-1 FALLS W/OUT INJ PAST YR: ICD-10-PCS | Mod: S$GLB,,, | Performed by: INTERNAL MEDICINE

## 2019-01-07 PROCEDURE — 99214 PR OFFICE/OUTPT VISIT, EST, LEVL IV, 30-39 MIN: ICD-10-PCS | Mod: S$GLB,,, | Performed by: INTERNAL MEDICINE

## 2019-01-07 PROCEDURE — 93005 EKG 12-LEAD: ICD-10-PCS | Mod: S$GLB,,, | Performed by: INTERNAL MEDICINE

## 2019-01-07 PROCEDURE — 3078F PR MOST RECENT DIASTOLIC BLOOD PRESSURE < 80 MM HG: ICD-10-PCS | Mod: S$GLB,,, | Performed by: INTERNAL MEDICINE

## 2019-01-07 PROCEDURE — 3078F DIAST BP <80 MM HG: CPT | Mod: S$GLB,,, | Performed by: INTERNAL MEDICINE

## 2019-01-07 PROCEDURE — 93010 EKG 12-LEAD: ICD-10-PCS | Mod: S$GLB,,, | Performed by: INTERNAL MEDICINE

## 2019-01-07 PROCEDURE — 99214 OFFICE O/P EST MOD 30 MIN: CPT | Mod: S$GLB,,, | Performed by: INTERNAL MEDICINE

## 2019-01-09 DIAGNOSIS — Z00.00 ROUTINE CHECK-UP: Primary | ICD-10-CM

## 2019-01-10 DIAGNOSIS — I42.0 ISCHEMIC DILATED CARDIOMYOPATHY: ICD-10-CM

## 2019-01-10 DIAGNOSIS — Z95.810 CARDIAC DEFIBRILLATOR IN SITU: Primary | ICD-10-CM

## 2019-01-10 DIAGNOSIS — I25.5 ISCHEMIC DILATED CARDIOMYOPATHY: ICD-10-CM

## 2019-01-17 ENCOUNTER — CLINICAL SUPPORT (OUTPATIENT)
Dept: CARDIOLOGY | Facility: CLINIC | Age: 79
End: 2019-01-17
Payer: MEDICARE

## 2019-01-17 ENCOUNTER — TELEPHONE (OUTPATIENT)
Dept: CARDIOLOGY | Facility: CLINIC | Age: 79
End: 2019-01-17

## 2019-01-17 DIAGNOSIS — I42.0 ISCHEMIC DILATED CARDIOMYOPATHY: ICD-10-CM

## 2019-01-17 DIAGNOSIS — Z95.810 CARDIAC DEFIBRILLATOR IN SITU: ICD-10-CM

## 2019-01-17 DIAGNOSIS — I25.5 ISCHEMIC DILATED CARDIOMYOPATHY: ICD-10-CM

## 2019-01-17 LAB
AV DELAY - LONGEST: 460 MSEC
AV DELAY - SHORTEST: 350 MSEC
BATTERY VOLTAGE (V): 3.11 V
CHARGE TIME (SEC): 10.1 SEC
HV IMPEDANCE (OHM): 66 OHM
IMPEDANCE RA LEAD (NATIVE): 478 OHMS
IMPEDANCE RA LEAD: 492 OHMS
OHS CV DC PP MS1: 0.4 MS
OHS CV DC PP MS2: 0.4 MS
OHS CV DC PP V1: NORMAL V
OHS CV DC PP V2: 3.5 V
P/R-WAVE RA LEAD (NATIVE): NORMAL MV
P/R-WAVE RA LEAD: NORMAL MV
PV DELAY - LONGEST: 420 MSEC
PV DELAY - SHORTEST: 310 MSEC
THRESHOLD MS RA LEAD (NATIVE): 0.4 MS
THRESHOLD MS RA LEAD: 0.4 MS
THRESHOLD V RA LEAD (NATIVE): 1 V
THRESHOLD V RA LEAD: 0.6 V

## 2019-01-17 PROCEDURE — 93283 CARDIAC DEVICE CHECK - IN CLINIC & HOSPITAL: ICD-10-PCS | Mod: S$GLB,,, | Performed by: INTERNAL MEDICINE

## 2019-01-17 PROCEDURE — 93283 PRGRMG EVAL IMPLANTABLE DFB: CPT | Mod: S$GLB,,, | Performed by: INTERNAL MEDICINE

## 2019-01-21 ENCOUNTER — DOCUMENTATION ONLY (OUTPATIENT)
Dept: CARDIOLOGY | Facility: CLINIC | Age: 79
End: 2019-01-21

## 2019-01-21 NOTE — PROGRESS NOTES
Pt into Pacemaker clinic for recheck of LCW ICD site/hematoma.  No new swelling, bruising noted to site. No drainage present. Pt reports no fever, or chills.  Site assessed by Dr. Singleton, orders rec'd to remove every other staple, remove remaining staples in 3 days.  Site cleansed with antiseptic cleanser, every other staple removed, steri strips placed.  No swelling or drainage noted after removal of staples.  Pt tolerated well.Pt instructed to report any new swelling, any drainage, and any fever/chills to the clinic.  Pt verbalized understanding.  Return appt scheduled.

## 2019-01-24 ENCOUNTER — DOCUMENTATION ONLY (OUTPATIENT)
Dept: CARDIOLOGY | Facility: CLINIC | Age: 79
End: 2019-01-24

## 2019-01-25 NOTE — PROGRESS NOTES
Late entry from 1/24/2019 0900:    Pt into Pacemaker clinic for LCW ICD wound recheck..  Swelling/hematoma appears stable, no new swelling, or bruising noted.  Pt reports no fever and/or chills, and no drainage.  Site cleansed with antiseptic cleanser, remaining staples removed, steri- strips applied.  Pt instructed to contact clinic if any swelling, new bruising, drainage, or fever/chills occurs. Pt verbalized understanding.

## 2019-01-29 ENCOUNTER — OFFICE VISIT (OUTPATIENT)
Dept: CARDIOLOGY | Facility: CLINIC | Age: 79
End: 2019-01-29
Payer: MEDICARE

## 2019-01-29 VITALS
DIASTOLIC BLOOD PRESSURE: 65 MMHG | BODY MASS INDEX: 26.08 KG/M2 | SYSTOLIC BLOOD PRESSURE: 107 MMHG | WEIGHT: 156.5 LBS | HEART RATE: 75 BPM | HEIGHT: 65 IN

## 2019-01-29 DIAGNOSIS — Z95.1 S/P CABG (CORONARY ARTERY BYPASS GRAFT): ICD-10-CM

## 2019-01-29 DIAGNOSIS — I42.0 ISCHEMIC DILATED CARDIOMYOPATHY: ICD-10-CM

## 2019-01-29 DIAGNOSIS — I25.5 ISCHEMIC DILATED CARDIOMYOPATHY: ICD-10-CM

## 2019-01-29 DIAGNOSIS — I48.3 TYPICAL ATRIAL FLUTTER: ICD-10-CM

## 2019-01-29 DIAGNOSIS — I25.10 CORONARY ARTERY DISEASE INVOLVING NATIVE CORONARY ARTERY OF NATIVE HEART WITHOUT ANGINA PECTORIS: Primary | ICD-10-CM

## 2019-01-29 DIAGNOSIS — I48.0 PAROXYSMAL ATRIAL FIBRILLATION: ICD-10-CM

## 2019-01-29 PROCEDURE — 99024 POSTOP FOLLOW-UP VISIT: CPT | Mod: S$GLB,,, | Performed by: INTERNAL MEDICINE

## 2019-01-29 PROCEDURE — 3074F PR MOST RECENT SYSTOLIC BLOOD PRESSURE < 130 MM HG: ICD-10-PCS | Mod: S$GLB,,, | Performed by: INTERNAL MEDICINE

## 2019-01-29 PROCEDURE — 3078F PR MOST RECENT DIASTOLIC BLOOD PRESSURE < 80 MM HG: ICD-10-PCS | Mod: S$GLB,,, | Performed by: INTERNAL MEDICINE

## 2019-01-29 PROCEDURE — 1101F PR PT FALLS ASSESS DOC 0-1 FALLS W/OUT INJ PAST YR: ICD-10-PCS | Mod: S$GLB,,, | Performed by: INTERNAL MEDICINE

## 2019-01-29 PROCEDURE — 99024 PR POST-OP FOLLOW-UP VISIT: ICD-10-PCS | Mod: S$GLB,,, | Performed by: INTERNAL MEDICINE

## 2019-01-29 PROCEDURE — 99999 PR PBB SHADOW E&M-EST. PATIENT-LVL III: CPT | Mod: PBBFAC,,, | Performed by: INTERNAL MEDICINE

## 2019-01-29 PROCEDURE — 3078F DIAST BP <80 MM HG: CPT | Mod: S$GLB,,, | Performed by: INTERNAL MEDICINE

## 2019-01-29 PROCEDURE — 99999 PR PBB SHADOW E&M-EST. PATIENT-LVL III: ICD-10-PCS | Mod: PBBFAC,,, | Performed by: INTERNAL MEDICINE

## 2019-01-29 PROCEDURE — 3074F SYST BP LT 130 MM HG: CPT | Mod: S$GLB,,, | Performed by: INTERNAL MEDICINE

## 2019-01-29 PROCEDURE — 1101F PT FALLS ASSESS-DOCD LE1/YR: CPT | Mod: S$GLB,,, | Performed by: INTERNAL MEDICINE

## 2019-01-29 RX ORDER — FUROSEMIDE 20 MG/1
20 TABLET ORAL DAILY
Qty: 90 TABLET | Refills: 3 | Status: SHIPPED | OUTPATIENT
Start: 2019-01-29 | End: 2020-01-27 | Stop reason: SDUPTHER

## 2019-01-29 RX ORDER — METOPROLOL SUCCINATE 25 MG/1
25 TABLET, EXTENDED RELEASE ORAL DAILY
Qty: 90 TABLET | Refills: 3 | Status: SHIPPED | OUTPATIENT
Start: 2019-01-29 | End: 2019-08-26 | Stop reason: SDUPTHER

## 2019-01-29 RX ORDER — SPIRONOLACTONE 25 MG/1
12.5 TABLET ORAL DAILY
Qty: 46 TABLET | Refills: 3 | Status: SHIPPED | OUTPATIENT
Start: 2019-01-29 | End: 2020-01-27 | Stop reason: SDUPTHER

## 2019-01-29 RX ORDER — CLOPIDOGREL BISULFATE 75 MG/1
75 TABLET ORAL EVERY OTHER DAY
Qty: 46 TABLET | Refills: 3 | Status: SHIPPED | OUTPATIENT
Start: 2019-01-29 | End: 2020-02-09

## 2019-01-29 RX ORDER — ROSUVASTATIN CALCIUM 5 MG/1
5 TABLET, COATED ORAL NIGHTLY
Qty: 90 TABLET | Refills: 3 | Status: SHIPPED | OUTPATIENT
Start: 2019-01-29 | End: 2020-01-27 | Stop reason: SDUPTHER

## 2019-01-29 NOTE — PROGRESS NOTES
Subjective:    Patient ID:  Sari Biswas is a 78 y.o. female who presents for follow-up of ICM    HPI  She comes with no complaints, no chest pain, no shortness of breath  FC II-III    Review of Systems   Constitution: Negative for decreased appetite, weakness, malaise/fatigue, weight gain and weight loss.   Cardiovascular: Negative for chest pain, dyspnea on exertion, leg swelling, palpitations and syncope.   Respiratory: Negative for cough and shortness of breath.    Gastrointestinal: Negative.    All other systems reviewed and are negative.       Objective:      Physical Exam   Constitutional: She is oriented to person, place, and time. She appears well-developed and well-nourished.   HENT:   Head: Normocephalic.   Eyes: Pupils are equal, round, and reactive to light.   Neck: Normal range of motion. Neck supple. No JVD present. Carotid bruit is not present. No thyromegaly present.   Cardiovascular: Normal rate, regular rhythm, normal heart sounds, intact distal pulses and normal pulses. PMI is not displaced. Exam reveals no gallop.   No murmur heard.  Pulmonary/Chest: Effort normal and breath sounds normal.   Abdominal: Soft. Normal appearance. She exhibits no mass. There is no hepatosplenomegaly. There is no tenderness.   Musculoskeletal: Normal range of motion. She exhibits no edema.   Neurological: She is alert and oriented to person, place, and time. She has normal strength and normal reflexes. No sensory deficit.   Skin: Skin is warm and intact.   Psychiatric: She has a normal mood and affect.   Nursing note and vitals reviewed.        Assessment:       1. Coronary artery disease involving native coronary artery of native heart without angina pectoris    2. S/P CABG (coronary artery bypass graft)    3. Paroxysmal atrial fibrillation    4. Typical atrial flutter    5. Ischemic dilated cardiomyopathy         Plan:   Cardiac rehab referral  Continue all cardiac medications  Regular exercise program  3 m  f/u with labs

## 2019-02-05 ENCOUNTER — TELEPHONE (OUTPATIENT)
Dept: CARDIOLOGY | Facility: CLINIC | Age: 79
End: 2019-02-05

## 2019-02-05 NOTE — TELEPHONE ENCOUNTER
----- Message from Tom Anderson sent at 2/5/2019  1:32 PM CST -----  Contact: same  Patient called in and needs to talk to nurse about some changes she needs to make in her medications?  Patient call back number is [phone]

## 2019-02-21 ENCOUNTER — TELEPHONE (OUTPATIENT)
Dept: CARDIOLOGY | Facility: CLINIC | Age: 79
End: 2019-02-21

## 2019-02-21 ENCOUNTER — CLINICAL SUPPORT (OUTPATIENT)
Dept: CARDIOLOGY | Facility: CLINIC | Age: 79
End: 2019-02-21
Payer: MEDICARE

## 2019-02-21 DIAGNOSIS — Z95.810 CARDIAC DEFIBRILLATOR IN SITU: ICD-10-CM

## 2019-02-21 DIAGNOSIS — I42.0 ISCHEMIC DILATED CARDIOMYOPATHY: ICD-10-CM

## 2019-02-21 DIAGNOSIS — I25.5 ISCHEMIC DILATED CARDIOMYOPATHY: ICD-10-CM

## 2019-02-21 NOTE — TELEPHONE ENCOUNTER
----- Message from Mandy Willams sent at 2/21/2019  4:04 PM CST -----  Contact: pt   Patient is getting a tooth extracted.  She would like to know if her medications that she is taking okay with that procedure? Do she needs to stop any of her meds.?

## 2019-02-22 ENCOUNTER — PATIENT MESSAGE (OUTPATIENT)
Dept: ADMINISTRATIVE | Facility: OTHER | Age: 79
End: 2019-02-22

## 2019-02-22 NOTE — TELEPHONE ENCOUNTER
----- Message from Vickie Woodson sent at 2/22/2019  2:43 PM CST -----  Type: Needs Medical Advice    Who Called:  Patient  Best Call Back Number: [phone]  Additional Information: Patient requesting to speak with nurse (Mariam)stated she is having a tooth pulled/needs to know if and when should stop taking blood thinner medication/please call back to advise.

## 2019-03-29 RX ORDER — SERTRALINE HYDROCHLORIDE 25 MG/1
25 TABLET, FILM COATED ORAL 2 TIMES DAILY
Qty: 180 TABLET | Refills: 3 | Status: SHIPPED | OUTPATIENT
Start: 2019-03-29 | End: 2019-04-03 | Stop reason: SDUPTHER

## 2019-03-29 NOTE — TELEPHONE ENCOUNTER
----- Message from Sonja Michele sent at 3/29/2019  9:15 AM CDT -----  Contact: Patient  Type: Needs Medical Advice    Who Called: Patient\  Best Call Back Number: [phone] (home)   Additional Information: Pt said she need a Rx filled Sent to [facility] Sertraline 25 mg asked if it could be done today

## 2019-04-01 ENCOUNTER — CLINICAL SUPPORT (OUTPATIENT)
Dept: CARDIOLOGY | Facility: CLINIC | Age: 79
End: 2019-04-01
Attending: INTERNAL MEDICINE
Payer: MEDICARE

## 2019-04-01 DIAGNOSIS — Z95.810 CARDIAC DEFIBRILLATOR IN SITU: ICD-10-CM

## 2019-04-01 DIAGNOSIS — I42.0 ISCHEMIC DILATED CARDIOMYOPATHY: ICD-10-CM

## 2019-04-01 DIAGNOSIS — I25.5 ISCHEMIC DILATED CARDIOMYOPATHY: ICD-10-CM

## 2019-04-03 ENCOUNTER — TELEPHONE (OUTPATIENT)
Dept: CARDIOLOGY | Facility: CLINIC | Age: 79
End: 2019-04-03

## 2019-04-03 NOTE — TELEPHONE ENCOUNTER
----- Message from Richa Connor sent at 4/3/2019 10:44 AM CDT -----  Contact: pt  Pt states need for the nurse to send a Rx-sertraline 25 mg to the pharmacy,[facility] till her mail order ones are filled...[phone] (home)         .  [facility]/pharmacy #[id number] - [place] - [address]  [address]  [place] [address]  Phone: [phone] Fax: [phone]

## 2019-04-03 NOTE — TELEPHONE ENCOUNTER
----- Message from Adam Edwards sent at 4/3/2019  4:15 PM CDT -----  Contact: Patient  Advised needs a carry over script of 14 pills for:  sertraline (ZOLOFT) 25 MG tablet  She is in short supply at this time  Sent to [facility]  Please call  [phone]      [facility]/pharmacy #[id number] - [place] - [address]  [address]  [place] [address]  Phone: [phone] Fax: [phone]

## 2019-04-04 RX ORDER — SERTRALINE HYDROCHLORIDE 25 MG/1
25 TABLET, FILM COATED ORAL 2 TIMES DAILY
Qty: 14 TABLET | Refills: 0 | Status: SHIPPED | OUTPATIENT
Start: 2019-04-04 | End: 2019-08-26 | Stop reason: HOSPADM

## 2019-04-08 DIAGNOSIS — E55.9 VITAMIN D DEFICIENCY: ICD-10-CM

## 2019-04-08 DIAGNOSIS — I25.5 ISCHEMIC DILATED CARDIOMYOPATHY: Primary | ICD-10-CM

## 2019-04-08 DIAGNOSIS — I42.0 ISCHEMIC DILATED CARDIOMYOPATHY: Primary | ICD-10-CM

## 2019-04-08 NOTE — TELEPHONE ENCOUNTER
----- Message from Mingo Taylor sent at 4/8/2019  9:43 AM CDT -----  Contact: Patient  Type:  RX Refill Request    Who Called:  Patient  Refill or New Rx:  New Rx  RX Name and Strength:    potassium chloride (MICRO-K) 10 MEQ CpSR - x4 weekly  VITAMIN D2 50,000 unit capsule - x3 weekly  Is this a 30 day or 90 day RX:  90  Preferred Pharmacy with phone number:    [facility]  [address] [address]  Phone: [phone] Fax: [phone]  Local or Mail Order:  Mail  Ordering Provider:  Omid Honeycutt Call Back Number:  [phone]  Additional Information:  Please advise patient when complete.

## 2019-04-10 ENCOUNTER — TELEPHONE (OUTPATIENT)
Dept: FAMILY MEDICINE | Facility: CLINIC | Age: 79
End: 2019-04-10

## 2019-04-10 RX ORDER — POTASSIUM CHLORIDE 750 MG/1
10 CAPSULE, EXTENDED RELEASE ORAL DAILY
Qty: 48 CAPSULE | Refills: 0 | Status: SHIPPED | OUTPATIENT
Start: 2019-04-10 | End: 2019-06-24 | Stop reason: SDUPTHER

## 2019-04-10 RX ORDER — ERGOCALCIFEROL 1.25 MG/1
CAPSULE ORAL
Qty: 36 CAPSULE | Refills: 0 | Status: SHIPPED | OUTPATIENT
Start: 2019-04-10 | End: 2019-04-18 | Stop reason: SDUPTHER

## 2019-04-10 NOTE — TELEPHONE ENCOUNTER
See refill message .  Pt checking on status on refill. Clarified w/ pt ,  Pt takes Potassium  10 meq 1 tablet on Tues, Thurs, Sat and Sun .  Pt says Dr Luu was prescribing Vit D 50,000 three times week ( Mon, Wed, Fri ) .--lp

## 2019-04-10 NOTE — TELEPHONE ENCOUNTER
----- Message from Tom Anderson sent at 4/10/2019 10:57 AM CDT -----  Contact: same  Patient called in and wanted to check the status of the following 2 Rx's that she had called for refills on 4/8/19.    1.  VITAMIN D2 50,000 unit capsule, TAKE 1 CAPSULE BY MOUTH THREE TIMES A WEEK , listed as historical med    2.  potassium chloride (MICRO-K) 10 MEQ CpSR, Route: TAKE 1 CAPSULE (10 MEQ TOTAL) BY MOUTH ONCE DAILY. - Oral    Patient would like these sent to her mail order pharmacy:    [facility]  for [address] [address]  Phone: [phone] Fax: [phone]    Patient call back is [phone])

## 2019-04-10 NOTE — PROGRESS NOTES
Refill Authorization Note     is requesting a refill authorization.    Brief assessment and rationale for refill: DEFER; nco (vit d)/ not previously prescribed by you   Name and strength of medication:  VITAMIN D2 50,000  (ergocalciferol) unit capsule  / potassium chloride (MICRO-K) 10 MEQ CpSR       Medication Therapy Plan: vit d- not commented on; NTBO(VIt D) ; not previously prescribed by you; defer to you     Medication reconciliation completed: No              How patient will take medication: tud           Comments:   Last Vitamin D: >20ng/mL (12 months)  No results found for: [id number]     Of note pt has current (09/28/2018) Vit D level in MEDIA Tab, from [employer], although the results are not legible in the scan.     Last Calcium (12 months)  Lab Results   Component Value Date    CALCIUM 9.4 12/14/2018    CALCIUM 9.5 10/26/2018    CALCIUM 8.4 08/02/2018        Last Potassium/Creatinine: (6 months)  Lab Results   Component Value Date    K 4.9 12/14/2018    K 3.8 10/26/2018    K 3.7 08/02/2018    Lab Results   Component Value Date    CREATININE 0.9 12/14/2018    CREATININE 0.9 10/26/2018    CREATININE 0.89 08/02/2018        APPOINTMENTS (past 12 m or future 3m authorizing provider)  LAST VISIT DATE  Turner Anne MD 11/16/2018         NEXT VISIT DATE  Turner Anne MD 5/27/2019

## 2019-04-10 NOTE — TELEPHONE ENCOUNTER
I have reviewed and agree with the assessment below.  ..  Thanks     Vitamin D  Unclear who is requesting 3x weekly dosing for vitamin D.  Cannot read scanned labs.  Recommend to repeat labs as 3x weekly of 50K is very high.    Potassium Chloride  Patient takes intermittently.  Has been prescribed 5 tablets here and there.  Unclear how she is determining need to use.

## 2019-04-18 ENCOUNTER — LAB VISIT (OUTPATIENT)
Dept: LAB | Facility: HOSPITAL | Age: 79
End: 2019-04-18
Attending: INTERNAL MEDICINE
Payer: MEDICARE

## 2019-04-18 DIAGNOSIS — I48.0 PAROXYSMAL ATRIAL FIBRILLATION: ICD-10-CM

## 2019-04-18 DIAGNOSIS — I25.5 ISCHEMIC DILATED CARDIOMYOPATHY: ICD-10-CM

## 2019-04-18 DIAGNOSIS — I25.10 CORONARY ARTERY DISEASE INVOLVING NATIVE CORONARY ARTERY OF NATIVE HEART WITHOUT ANGINA PECTORIS: ICD-10-CM

## 2019-04-18 DIAGNOSIS — Z95.1 S/P CABG (CORONARY ARTERY BYPASS GRAFT): ICD-10-CM

## 2019-04-18 DIAGNOSIS — I42.0 ISCHEMIC DILATED CARDIOMYOPATHY: ICD-10-CM

## 2019-04-18 DIAGNOSIS — E55.9 VITAMIN D DEFICIENCY: ICD-10-CM

## 2019-04-18 DIAGNOSIS — I48.3 TYPICAL ATRIAL FLUTTER: ICD-10-CM

## 2019-04-18 LAB
25(OH)D3+25(OH)D2 SERPL-MCNC: 42 NG/ML (ref 30–96)
ALBUMIN SERPL BCP-MCNC: 3.6 G/DL (ref 3.5–5.2)
ALP SERPL-CCNC: 106 U/L (ref 55–135)
ALT SERPL W/O P-5'-P-CCNC: 13 U/L (ref 10–44)
ANION GAP SERPL CALC-SCNC: 6 MMOL/L (ref 8–16)
AST SERPL-CCNC: 20 U/L (ref 10–40)
BILIRUB DIRECT SERPL-MCNC: 0.3 MG/DL (ref 0.1–0.3)
BILIRUB SERPL-MCNC: 0.6 MG/DL (ref 0.1–1)
BNP SERPL-MCNC: 454 PG/ML (ref 0–99)
BUN SERPL-MCNC: 19 MG/DL (ref 8–23)
CALCIUM SERPL-MCNC: 9.1 MG/DL (ref 8.7–10.5)
CHLORIDE SERPL-SCNC: 105 MMOL/L (ref 95–110)
CHOLEST SERPL-MCNC: 147 MG/DL (ref 120–199)
CHOLEST/HDLC SERPL: 3.2 {RATIO} (ref 2–5)
CO2 SERPL-SCNC: 30 MMOL/L (ref 23–29)
CREAT SERPL-MCNC: 1 MG/DL (ref 0.5–1.4)
EST. GFR  (AFRICAN AMERICAN): >60 ML/MIN/1.73 M^2
EST. GFR  (NON AFRICAN AMERICAN): 53.7 ML/MIN/1.73 M^2
GLUCOSE SERPL-MCNC: 89 MG/DL (ref 70–110)
HDLC SERPL-MCNC: 46 MG/DL (ref 40–75)
HDLC SERPL: 31.3 % (ref 20–50)
LDLC SERPL CALC-MCNC: 84.2 MG/DL (ref 63–159)
NONHDLC SERPL-MCNC: 101 MG/DL
POTASSIUM SERPL-SCNC: 5 MMOL/L (ref 3.5–5.1)
PROT SERPL-MCNC: 6.6 G/DL (ref 6–8.4)
SODIUM SERPL-SCNC: 141 MMOL/L (ref 136–145)
TRIGL SERPL-MCNC: 84 MG/DL (ref 30–150)

## 2019-04-18 PROCEDURE — 80048 BASIC METABOLIC PNL TOTAL CA: CPT

## 2019-04-18 PROCEDURE — 80076 HEPATIC FUNCTION PANEL: CPT

## 2019-04-18 PROCEDURE — 83880 ASSAY OF NATRIURETIC PEPTIDE: CPT

## 2019-04-18 PROCEDURE — 80061 LIPID PANEL: CPT

## 2019-04-18 PROCEDURE — 82306 VITAMIN D 25 HYDROXY: CPT

## 2019-04-18 PROCEDURE — 36415 COLL VENOUS BLD VENIPUNCTURE: CPT | Mod: PO

## 2019-04-18 NOTE — TELEPHONE ENCOUNTER
----- Message from Jakub Vaca sent at 4/18/2019  8:11 AM CDT -----  Contact: Ashley with [facility]  Type:  Pharmacy Calling to Clarify an RX    Name of Caller:  Ashley  Pharmacy Name:  [facility]  Prescription Name:  VITAMIN D2 50,000 unit capsule  What do they need to clarify?:  instructions  Best Call Back Number:  [phone]  Additional Information:  Ashley said they have been trying to get this resolved since 4/11/19. This is their 3rd attempt to reach the office. Please call to advise.  Ref #: [id number]

## 2019-04-20 RX ORDER — ERGOCALCIFEROL 1.25 MG/1
CAPSULE ORAL
Qty: 36 CAPSULE | Refills: 0 | Status: SHIPPED | OUTPATIENT
Start: 2019-04-20 | End: 2019-09-03 | Stop reason: SDUPTHER

## 2019-04-25 ENCOUNTER — OFFICE VISIT (OUTPATIENT)
Dept: CARDIOLOGY | Facility: CLINIC | Age: 79
End: 2019-04-25
Payer: MEDICARE

## 2019-04-25 VITALS
HEIGHT: 64 IN | HEART RATE: 81 BPM | WEIGHT: 154.56 LBS | DIASTOLIC BLOOD PRESSURE: 54 MMHG | BODY MASS INDEX: 26.39 KG/M2 | SYSTOLIC BLOOD PRESSURE: 99 MMHG

## 2019-04-25 DIAGNOSIS — I42.0 ISCHEMIC DILATED CARDIOMYOPATHY: ICD-10-CM

## 2019-04-25 DIAGNOSIS — I25.5 ISCHEMIC DILATED CARDIOMYOPATHY: ICD-10-CM

## 2019-04-25 DIAGNOSIS — I25.110 CORONARY ARTERY DISEASE INVOLVING NATIVE CORONARY ARTERY OF NATIVE HEART WITH UNSTABLE ANGINA PECTORIS: Primary | ICD-10-CM

## 2019-04-25 DIAGNOSIS — I10 ESSENTIAL HYPERTENSION: ICD-10-CM

## 2019-04-25 DIAGNOSIS — I48.0 PAF (PAROXYSMAL ATRIAL FIBRILLATION): ICD-10-CM

## 2019-04-25 PROCEDURE — 1101F PT FALLS ASSESS-DOCD LE1/YR: CPT | Mod: S$GLB,,, | Performed by: INTERNAL MEDICINE

## 2019-04-25 PROCEDURE — 3074F PR MOST RECENT SYSTOLIC BLOOD PRESSURE < 130 MM HG: ICD-10-PCS | Mod: S$GLB,,, | Performed by: INTERNAL MEDICINE

## 2019-04-25 PROCEDURE — 99999 PR PBB SHADOW E&M-EST. PATIENT-LVL III: CPT | Mod: PBBFAC,,, | Performed by: INTERNAL MEDICINE

## 2019-04-25 PROCEDURE — 1101F PR PT FALLS ASSESS DOC 0-1 FALLS W/OUT INJ PAST YR: ICD-10-PCS | Mod: S$GLB,,, | Performed by: INTERNAL MEDICINE

## 2019-04-25 PROCEDURE — 99214 PR OFFICE/OUTPT VISIT, EST, LEVL IV, 30-39 MIN: ICD-10-PCS | Mod: S$GLB,,, | Performed by: INTERNAL MEDICINE

## 2019-04-25 PROCEDURE — 99214 OFFICE O/P EST MOD 30 MIN: CPT | Mod: S$GLB,,, | Performed by: INTERNAL MEDICINE

## 2019-04-25 PROCEDURE — 99999 PR PBB SHADOW E&M-EST. PATIENT-LVL III: ICD-10-PCS | Mod: PBBFAC,,, | Performed by: INTERNAL MEDICINE

## 2019-04-25 PROCEDURE — 3074F SYST BP LT 130 MM HG: CPT | Mod: S$GLB,,, | Performed by: INTERNAL MEDICINE

## 2019-04-25 PROCEDURE — 3078F PR MOST RECENT DIASTOLIC BLOOD PRESSURE < 80 MM HG: ICD-10-PCS | Mod: S$GLB,,, | Performed by: INTERNAL MEDICINE

## 2019-04-25 PROCEDURE — 99499 RISK ADDL DX/OHS AUDIT: ICD-10-PCS | Mod: S$GLB,,, | Performed by: INTERNAL MEDICINE

## 2019-04-25 PROCEDURE — 99499 UNLISTED E&M SERVICE: CPT | Mod: S$GLB,,, | Performed by: INTERNAL MEDICINE

## 2019-04-25 PROCEDURE — 3078F DIAST BP <80 MM HG: CPT | Mod: S$GLB,,, | Performed by: INTERNAL MEDICINE

## 2019-04-25 NOTE — PROGRESS NOTES
Subjective:    Patient ID:  Sari Biswas is a 78 y.o. female who presents for follow-up of ICM    HPI  She comes with no complaints, no chest pain, no shortness of breath  FC II    Review of Systems   Constitution: Negative for decreased appetite, malaise/fatigue, weight gain and weight loss.   Cardiovascular: Negative for chest pain, dyspnea on exertion, leg swelling, palpitations and syncope.   Respiratory: Negative for cough and shortness of breath.    Gastrointestinal: Negative.    Neurological: Negative for weakness.   All other systems reviewed and are negative.       Objective:      Physical Exam   Constitutional: She is oriented to person, place, and time. She appears well-developed and well-nourished.   HENT:   Head: Normocephalic.   Eyes: Pupils are equal, round, and reactive to light.   Neck: Normal range of motion. Neck supple. No JVD present. Carotid bruit is not present. No thyromegaly present.   Cardiovascular: Normal rate, regular rhythm, normal heart sounds, intact distal pulses and normal pulses. PMI is not displaced. Exam reveals no gallop.   No murmur heard.  Pulmonary/Chest: Effort normal and breath sounds normal.   Abdominal: Soft. Normal appearance. She exhibits no mass. There is no hepatosplenomegaly. There is no tenderness.   Musculoskeletal: Normal range of motion. She exhibits no edema.   Neurological: She is alert and oriented to person, place, and time. She has normal strength and normal reflexes. No sensory deficit.   Skin: Skin is warm and intact.   Psychiatric: She has a normal mood and affect.   Nursing note and vitals reviewed.    Labs reviewed      Assessment:       1. Coronary artery disease involving native coronary artery of native heart with unstable angina pectoris    2. PAF (paroxysmal atrial fibrillation)    3. Essential hypertension    4. Ischemic dilated cardiomyopathy         Plan:     Continue all cardiac medications  Regular exercise program  4 m f/u with  echo

## 2019-04-30 ENCOUNTER — CLINICAL SUPPORT (OUTPATIENT)
Dept: CARDIOLOGY | Facility: CLINIC | Age: 79
End: 2019-04-30
Attending: INTERNAL MEDICINE
Payer: MEDICARE

## 2019-04-30 DIAGNOSIS — Z95.810 CARDIAC DEFIBRILLATOR IN SITU: ICD-10-CM

## 2019-04-30 DIAGNOSIS — I42.0 ISCHEMIC DILATED CARDIOMYOPATHY: ICD-10-CM

## 2019-04-30 DIAGNOSIS — I25.5 ISCHEMIC DILATED CARDIOMYOPATHY: ICD-10-CM

## 2019-05-02 ENCOUNTER — TELEPHONE (OUTPATIENT)
Dept: CARDIOLOGY | Facility: CLINIC | Age: 79
End: 2019-05-02

## 2019-05-02 ENCOUNTER — DOCUMENTATION ONLY (OUTPATIENT)
Dept: CARDIOLOGY | Facility: CLINIC | Age: 79
End: 2019-05-02

## 2019-05-02 NOTE — TELEPHONE ENCOUNTER
"Pt instructed to increase Toprol-XL 25mg to 50mg per Dr. Crystal and ER physician but she wants to check w/Dr. Singleton first.  ---------------------------------------------------------------------------  Pt was active yesterday, got dizzy, felt hard "thump" around defibrillator implant. Pt went to ER after speaking with .   Pt reports she was told she had an "episode" but she was back in normal rhythm.     I explained to the pt that he is out until Tuesday but I will send a message to him.   "

## 2019-05-02 NOTE — TELEPHONE ENCOUNTER
----- Message from Corrine Sanchez sent at 5/2/2019  8:07 AM CDT -----  Contact: self   Patient was in the ER last night and has a issue with her defibrillator, please call back [phone].

## 2019-05-02 NOTE — PROGRESS NOTES
Rec'd notification from [facility] monitoring for episode w/ATP and shock therapy.  Pt noted to have went to ER.    Rhythm presentation is consistent with 1:1 atrial flutter vs VT.  Morpholgy is suggestive of Atrial flutter w/RVR.  Dr. Kee notified.

## 2019-05-02 NOTE — TELEPHONE ENCOUNTER
Dr. Kee notified of rhythm receiving shock therapy presents as Atrial flutter 1:1 vs VT.  MD requesting Dr. Singleton be notified.  Dr. Singleton notified, also notified of Toprol increase from 25 mg to 50 mg.  MD states he is ok with the increase in Toprol but for the pt to take Toprol 25 mg BID, and also to f/u with Dr. Fitzgerald on Monday 5/6/19. PT was notified of MD med orders and orders to f/u with Dr. Fitzgerald, will notifiy Afua to schedule appt.

## 2019-05-05 NOTE — PROGRESS NOTES
Subjective:    Patient ID:  Sari Biswas is a 78 y.o. female who presents for follow-up of Hospital Follow Up (defib went off 5/1)      HPI 79 yo female with atrial fibrillation, atrial flutter, CAD s/p CABG, ischemic cardiomyopathy, CHF.  Primary cardiologist at this time is Dr. Singleton.  Underwent high risk PCI by Dr. Colon 7/11/18 complicated by LAD perforation and tamponade.  7/11/18 Emergent CABG x 3 + sewing of LAD.  Subsequently had cardiogenic shock with multiple pressors.  Was in atrial fibrillation and atrial flutter during that time.  Placed on amiodarone.    EF was 35% 7/17.  Placed on Lifevest.  Seen by Dr. Singleton.  Noted to be in atrial flutter with cycle length of 270 msec, c/w isthmus dependent atrial flutter.  DCCV 10/8/18.   Echo 10/0818 EF 25%  Notes indicate SVT prior to her surgery.  She reports she had intermittent palpitations that resolved.  Of note, had right femoral hematoma requiring wound care.     RFA 10/26/18 RFA for atrial flutter.  She was in atrial flutter at time of the procedure.  Typical flutter confirmed.  Amiodarone discontinued after procedure.  Echo 12/14/18 EF 25%    Dual chamber ICD implanted 1/10/19 (Dr. Singleton).    Device interrogations reveal stable function of the leads.  Atrial burden 0%.    2/21/19 Monomorphic VT, cycle length 300 msec, treated successfully with ATP.  This was asymptomatic.  5/1/19 Inappropriate shock for 2:1 Atrial tachycardia/flutter.  Cycle length of ventricular response averaged 315 msec.  She noted dizziness prior to the event.    She noted hair loss while on amiodarone.  This has persisted despite discontinuing amiodarone in October.  Overall feeling well.  Looking better as well.  She believes she is 90% back to normal.    Review of Systems   Constitution: Negative. Negative for malaise/fatigue.   Cardiovascular: Negative for chest pain, dyspnea on exertion, irregular heartbeat, leg swelling, near-syncope, orthopnea, palpitations,  paroxysmal nocturnal dyspnea and syncope.   Respiratory: Negative for cough and shortness of breath.    Neurological: Negative for dizziness, light-headedness and weakness.   All other systems reviewed and are negative.       Objective:    Physical Exam   Constitutional: She is oriented to person, place, and time. She appears well-developed and well-nourished.   Eyes: Conjunctivae are normal. No scleral icterus.   Neck: No JVD present. No tracheal deviation present.   Cardiovascular: Normal rate and regular rhythm. PMI is not displaced.   Pulmonary/Chest: Effort normal and breath sounds normal. No respiratory distress.   Abdominal: Soft. There is no hepatosplenomegaly. There is no tenderness.   Musculoskeletal: She exhibits no edema or tenderness.   Neurological: She is alert and oriented to person, place, and time.   Skin: Skin is warm and dry. No rash noted.   Psychiatric: She has a normal mood and affect. Her behavior is normal.         Assessment:       1. Paroxysmal atrial fibrillation    2. Acute on chronic combined systolic (congestive) and diastolic (congestive) heart failure    3. Coronary artery disease involving native coronary artery of native heart with unstable angina pectoris    4. Essential hypertension    5. Ischemic dilated cardiomyopathy    6. S/P CABG (coronary artery bypass graft)    7. Typical atrial flutter    8. Ventricular tachycardia         Plan:       Paroxysmal AT/flutter, suspect left sided.  Also had an episode of VT.    Recommend Sotalol 80 mg BID.  Admit for Sotalol monitoring.  She prefers to do this locally.  Reprogram the device to allow for better discrimination.  Turn on morphology discriminator.        F/u in 6 months.

## 2019-05-06 ENCOUNTER — DOCUMENTATION ONLY (OUTPATIENT)
Dept: CARDIOLOGY | Facility: CLINIC | Age: 79
End: 2019-05-06

## 2019-05-06 ENCOUNTER — OFFICE VISIT (OUTPATIENT)
Dept: CARDIOLOGY | Facility: CLINIC | Age: 79
End: 2019-05-06
Payer: MEDICARE

## 2019-05-06 VITALS
WEIGHT: 155.44 LBS | SYSTOLIC BLOOD PRESSURE: 98 MMHG | BODY MASS INDEX: 25.9 KG/M2 | DIASTOLIC BLOOD PRESSURE: 59 MMHG | HEART RATE: 59 BPM | HEIGHT: 65 IN

## 2019-05-06 DIAGNOSIS — I50.43 ACUTE ON CHRONIC COMBINED SYSTOLIC (CONGESTIVE) AND DIASTOLIC (CONGESTIVE) HEART FAILURE: ICD-10-CM

## 2019-05-06 DIAGNOSIS — I25.5 ISCHEMIC DILATED CARDIOMYOPATHY: ICD-10-CM

## 2019-05-06 DIAGNOSIS — I48.0 PAF (PAROXYSMAL ATRIAL FIBRILLATION): Primary | ICD-10-CM

## 2019-05-06 DIAGNOSIS — Z95.1 S/P CABG (CORONARY ARTERY BYPASS GRAFT): ICD-10-CM

## 2019-05-06 DIAGNOSIS — I42.0 ISCHEMIC DILATED CARDIOMYOPATHY: ICD-10-CM

## 2019-05-06 DIAGNOSIS — I48.0 PAROXYSMAL ATRIAL FIBRILLATION: Primary | ICD-10-CM

## 2019-05-06 DIAGNOSIS — I48.0 PAF (PAROXYSMAL ATRIAL FIBRILLATION): ICD-10-CM

## 2019-05-06 DIAGNOSIS — I25.110 CORONARY ARTERY DISEASE INVOLVING NATIVE CORONARY ARTERY OF NATIVE HEART WITH UNSTABLE ANGINA PECTORIS: ICD-10-CM

## 2019-05-06 DIAGNOSIS — I10 ESSENTIAL HYPERTENSION: ICD-10-CM

## 2019-05-06 DIAGNOSIS — I47.20 VENTRICULAR TACHYCARDIA: ICD-10-CM

## 2019-05-06 DIAGNOSIS — I48.3 TYPICAL ATRIAL FLUTTER: ICD-10-CM

## 2019-05-06 PROCEDURE — 93010 ELECTROCARDIOGRAM REPORT: CPT | Mod: S$GLB,,, | Performed by: INTERNAL MEDICINE

## 2019-05-06 PROCEDURE — 1101F PR PT FALLS ASSESS DOC 0-1 FALLS W/OUT INJ PAST YR: ICD-10-PCS | Mod: S$GLB,,, | Performed by: INTERNAL MEDICINE

## 2019-05-06 PROCEDURE — 93010 EKG 12-LEAD: ICD-10-PCS | Mod: S$GLB,,, | Performed by: INTERNAL MEDICINE

## 2019-05-06 PROCEDURE — 3074F SYST BP LT 130 MM HG: CPT | Mod: S$GLB,,, | Performed by: INTERNAL MEDICINE

## 2019-05-06 PROCEDURE — 99214 PR OFFICE/OUTPT VISIT, EST, LEVL IV, 30-39 MIN: ICD-10-PCS | Mod: S$GLB,,, | Performed by: INTERNAL MEDICINE

## 2019-05-06 PROCEDURE — 93005 EKG 12-LEAD: ICD-10-PCS | Mod: S$GLB,,, | Performed by: INTERNAL MEDICINE

## 2019-05-06 PROCEDURE — 99999 PR PBB SHADOW E&M-EST. PATIENT-LVL III: ICD-10-PCS | Mod: PBBFAC,,, | Performed by: INTERNAL MEDICINE

## 2019-05-06 PROCEDURE — 99499 UNLISTED E&M SERVICE: CPT | Mod: S$GLB,,, | Performed by: INTERNAL MEDICINE

## 2019-05-06 PROCEDURE — 93005 ELECTROCARDIOGRAM TRACING: CPT | Mod: S$GLB,,, | Performed by: INTERNAL MEDICINE

## 2019-05-06 PROCEDURE — 3078F DIAST BP <80 MM HG: CPT | Mod: S$GLB,,, | Performed by: INTERNAL MEDICINE

## 2019-05-06 PROCEDURE — 3078F PR MOST RECENT DIASTOLIC BLOOD PRESSURE < 80 MM HG: ICD-10-PCS | Mod: S$GLB,,, | Performed by: INTERNAL MEDICINE

## 2019-05-06 PROCEDURE — 99999 PR PBB SHADOW E&M-EST. PATIENT-LVL III: CPT | Mod: PBBFAC,,, | Performed by: INTERNAL MEDICINE

## 2019-05-06 PROCEDURE — 1101F PT FALLS ASSESS-DOCD LE1/YR: CPT | Mod: S$GLB,,, | Performed by: INTERNAL MEDICINE

## 2019-05-06 PROCEDURE — 99499 RISK ADDL DX/OHS AUDIT: ICD-10-PCS | Mod: S$GLB,,, | Performed by: INTERNAL MEDICINE

## 2019-05-06 PROCEDURE — 3074F PR MOST RECENT SYSTOLIC BLOOD PRESSURE < 130 MM HG: ICD-10-PCS | Mod: S$GLB,,, | Performed by: INTERNAL MEDICINE

## 2019-05-06 PROCEDURE — 99214 OFFICE O/P EST MOD 30 MIN: CPT | Mod: S$GLB,,, | Performed by: INTERNAL MEDICINE

## 2019-05-06 NOTE — PROGRESS NOTES
Pt in this AM for clinic visit with Dr. Fitzgerald s/p shock therapy for Atrial flutter.  New orders rec'd for reprogramming:    Morphology match: OFF->ON  VT1 zone: ATP/shock->Monitor only  VT1 zone detection rate: 171 bpm->150 bpm  VT2 zone detection rate: 188 bpm->182 bpm

## 2019-05-08 ENCOUNTER — TELEPHONE (OUTPATIENT)
Dept: CARDIOLOGY | Facility: CLINIC | Age: 79
End: 2019-05-08

## 2019-05-08 NOTE — TELEPHONE ENCOUNTER
Please advise: please review Dr Fitzgerald notes from 5/6. Please place Sotalol orders for pt to be admitted

## 2019-05-08 NOTE — TELEPHONE ENCOUNTER
----- Message from Sana Mckinley sent at 5/8/2019 10:33 AM CDT -----  Contact: Sari  Type: Needs Medical Advice    Who Called:  patient  Best Call Back Number: [phone]  Additional Information:  Calling to check is nurse has spoken to Dr Mina regarding questions that she needs/wants an answer. Thanks!

## 2019-05-13 ENCOUNTER — PATIENT OUTREACH (OUTPATIENT)
Dept: ADMINISTRATIVE | Facility: HOSPITAL | Age: 79
End: 2019-05-13

## 2019-05-13 ENCOUNTER — TELEPHONE (OUTPATIENT)
Dept: CARDIOLOGY | Facility: CLINIC | Age: 79
End: 2019-05-13

## 2019-05-13 NOTE — TELEPHONE ENCOUNTER
Patient asked if she can come in the office to see Dr Mina to discuss medication change. She stated she does not want to be admitted.

## 2019-05-13 NOTE — PROGRESS NOTES
Health Maintenance Due   Topic Date Due    Shingles Vaccine (1 of 2) [birth year]/1989    DEXA SCAN  12/18/2018     Chart review complete/scrubbed 05/13/2019  Future Appointments   Date Time Provider Department Center   5/13/2019  1:00 PM [facility]   5/15/2019  1:00 PM [facility]   5/27/2019  3:00 PM Turner Anne MD [facility] FAM MED Welcome   8/19/2019 10:45 AM [facility] CARDIO Welcome   8/26/2019  9:45 AM Rodo Singleton MD [facility] CARDIO Welcome

## 2019-05-13 NOTE — TELEPHONE ENCOUNTER
----- Message from Lawson Douglass sent at 5/13/2019 10:53 AM CDT -----  Type: Needs Medical Advice    Who Called:  Patient    Best Call Back Number: [phone] (Before 12:30)  ([facility]) [phone]  Additional Information: Patient states that she has been waiting since 05/08 for a callback.  Please call as soon as possible

## 2019-05-14 ENCOUNTER — OFFICE VISIT (OUTPATIENT)
Dept: CARDIOLOGY | Facility: CLINIC | Age: 79
End: 2019-05-14
Payer: MEDICARE

## 2019-05-14 VITALS
SYSTOLIC BLOOD PRESSURE: 107 MMHG | DIASTOLIC BLOOD PRESSURE: 58 MMHG | BODY MASS INDEX: 26.33 KG/M2 | WEIGHT: 158.06 LBS | HEIGHT: 65 IN | HEART RATE: 69 BPM

## 2019-05-14 DIAGNOSIS — Z95.810 ICD (IMPLANTABLE CARDIOVERTER-DEFIBRILLATOR) IN PLACE: ICD-10-CM

## 2019-05-14 DIAGNOSIS — I48.0 PAF (PAROXYSMAL ATRIAL FIBRILLATION): Primary | ICD-10-CM

## 2019-05-14 DIAGNOSIS — I25.110 CORONARY ARTERY DISEASE INVOLVING NATIVE CORONARY ARTERY OF NATIVE HEART WITH UNSTABLE ANGINA PECTORIS: ICD-10-CM

## 2019-05-14 DIAGNOSIS — I42.0 ISCHEMIC DILATED CARDIOMYOPATHY: ICD-10-CM

## 2019-05-14 DIAGNOSIS — Z95.1 S/P CABG (CORONARY ARTERY BYPASS GRAFT): ICD-10-CM

## 2019-05-14 DIAGNOSIS — I25.5 ISCHEMIC DILATED CARDIOMYOPATHY: ICD-10-CM

## 2019-05-14 PROCEDURE — 1101F PT FALLS ASSESS-DOCD LE1/YR: CPT | Mod: S$GLB,,, | Performed by: INTERNAL MEDICINE

## 2019-05-14 PROCEDURE — 3078F DIAST BP <80 MM HG: CPT | Mod: S$GLB,,, | Performed by: INTERNAL MEDICINE

## 2019-05-14 PROCEDURE — 1101F PR PT FALLS ASSESS DOC 0-1 FALLS W/OUT INJ PAST YR: ICD-10-PCS | Mod: S$GLB,,, | Performed by: INTERNAL MEDICINE

## 2019-05-14 PROCEDURE — 99499 UNLISTED E&M SERVICE: CPT | Mod: S$GLB,,, | Performed by: INTERNAL MEDICINE

## 2019-05-14 PROCEDURE — 99214 PR OFFICE/OUTPT VISIT, EST, LEVL IV, 30-39 MIN: ICD-10-PCS | Mod: S$GLB,,, | Performed by: INTERNAL MEDICINE

## 2019-05-14 PROCEDURE — 3074F PR MOST RECENT SYSTOLIC BLOOD PRESSURE < 130 MM HG: ICD-10-PCS | Mod: S$GLB,,, | Performed by: INTERNAL MEDICINE

## 2019-05-14 PROCEDURE — 99214 OFFICE O/P EST MOD 30 MIN: CPT | Mod: S$GLB,,, | Performed by: INTERNAL MEDICINE

## 2019-05-14 PROCEDURE — 99999 PR PBB SHADOW E&M-EST. PATIENT-LVL III: ICD-10-PCS | Mod: PBBFAC,,, | Performed by: INTERNAL MEDICINE

## 2019-05-14 PROCEDURE — 3078F PR MOST RECENT DIASTOLIC BLOOD PRESSURE < 80 MM HG: ICD-10-PCS | Mod: S$GLB,,, | Performed by: INTERNAL MEDICINE

## 2019-05-14 PROCEDURE — 99999 PR PBB SHADOW E&M-EST. PATIENT-LVL III: CPT | Mod: PBBFAC,,, | Performed by: INTERNAL MEDICINE

## 2019-05-14 PROCEDURE — 99499 RISK ADDL DX/OHS AUDIT: ICD-10-PCS | Mod: S$GLB,,, | Performed by: INTERNAL MEDICINE

## 2019-05-14 PROCEDURE — 3074F SYST BP LT 130 MM HG: CPT | Mod: S$GLB,,, | Performed by: INTERNAL MEDICINE

## 2019-05-14 NOTE — PROGRESS NOTES
Subjective:    Patient ID:  Sari Biswas is a 78 y.o. female who presents for follow-up of PAF    HPI  She comes with no complaints, no chest pain, no shortness of breath  Does not want to take sotalol      Review of Systems   Constitution: Negative for decreased appetite, malaise/fatigue, weight gain and weight loss.   Cardiovascular: Negative for chest pain, dyspnea on exertion, leg swelling, palpitations and syncope.   Respiratory: Negative for cough and shortness of breath.    Gastrointestinal: Negative.    Neurological: Negative for weakness.   All other systems reviewed and are negative.       Objective:      Physical Exam   Constitutional: She is oriented to person, place, and time. She appears well-developed and well-nourished.   HENT:   Head: Normocephalic.   Eyes: Pupils are equal, round, and reactive to light.   Neck: Normal range of motion. Neck supple. No JVD present. Carotid bruit is not present. No thyromegaly present.   Cardiovascular: Normal rate, regular rhythm, normal heart sounds, intact distal pulses and normal pulses. PMI is not displaced. Exam reveals no gallop.   No murmur heard.  Pulmonary/Chest: Effort normal and breath sounds normal.   Abdominal: Soft. Normal appearance. She exhibits no mass. There is no hepatosplenomegaly. There is no tenderness.   Musculoskeletal: Normal range of motion. She exhibits no edema.   Neurological: She is alert and oriented to person, place, and time. She has normal strength and normal reflexes. No sensory deficit.   Skin: Skin is warm and intact.   Psychiatric: She has a normal mood and affect.   Nursing note and vitals reviewed.        Assessment:       1. PAF (paroxysmal atrial fibrillation)    2. Coronary artery disease involving native coronary artery of native heart with unstable angina pectoris    3. Ischemic dilated cardiomyopathy    4. S/P CABG (coronary artery bypass graft)         Plan:     Continue all cardiac medications  Regular exercise  program  Amiodarone 200 mg qd  Keep apt for august/2019

## 2019-05-27 ENCOUNTER — OFFICE VISIT (OUTPATIENT)
Dept: FAMILY MEDICINE | Facility: CLINIC | Age: 79
End: 2019-05-27
Payer: MEDICARE

## 2019-05-27 VITALS
BODY MASS INDEX: 26.55 KG/M2 | HEIGHT: 65 IN | WEIGHT: 159.38 LBS | SYSTOLIC BLOOD PRESSURE: 108 MMHG | HEART RATE: 68 BPM | OXYGEN SATURATION: 96 % | TEMPERATURE: 98 F | DIASTOLIC BLOOD PRESSURE: 64 MMHG

## 2019-05-27 DIAGNOSIS — Z00.00 ROUTINE MEDICAL EXAM: Primary | ICD-10-CM

## 2019-05-27 DIAGNOSIS — I48.20 CHRONIC ATRIAL FIBRILLATION: ICD-10-CM

## 2019-05-27 DIAGNOSIS — I25.10 CORONARY ARTERY DISEASE, ANGINA PRESENCE UNSPECIFIED, UNSPECIFIED VESSEL OR LESION TYPE, UNSPECIFIED WHETHER NATIVE OR TRANSPLANTED HEART: ICD-10-CM

## 2019-05-27 DIAGNOSIS — I10 ESSENTIAL HYPERTENSION: ICD-10-CM

## 2019-05-27 DIAGNOSIS — Z78.0 MENOPAUSE: ICD-10-CM

## 2019-05-27 PROCEDURE — 3074F SYST BP LT 130 MM HG: CPT | Mod: S$GLB,,, | Performed by: FAMILY MEDICINE

## 2019-05-27 PROCEDURE — 99999 PR PBB SHADOW E&M-EST. PATIENT-LVL III: CPT | Mod: PBBFAC,,, | Performed by: FAMILY MEDICINE

## 2019-05-27 PROCEDURE — 99397 PR PREVENTIVE VISIT,EST,65 & OVER: ICD-10-PCS | Mod: S$GLB,,, | Performed by: FAMILY MEDICINE

## 2019-05-27 PROCEDURE — 3078F PR MOST RECENT DIASTOLIC BLOOD PRESSURE < 80 MM HG: ICD-10-PCS | Mod: S$GLB,,, | Performed by: FAMILY MEDICINE

## 2019-05-27 PROCEDURE — 99397 PER PM REEVAL EST PAT 65+ YR: CPT | Mod: S$GLB,,, | Performed by: FAMILY MEDICINE

## 2019-05-27 PROCEDURE — 3074F PR MOST RECENT SYSTOLIC BLOOD PRESSURE < 130 MM HG: ICD-10-PCS | Mod: S$GLB,,, | Performed by: FAMILY MEDICINE

## 2019-05-27 PROCEDURE — 3078F DIAST BP <80 MM HG: CPT | Mod: S$GLB,,, | Performed by: FAMILY MEDICINE

## 2019-05-27 PROCEDURE — 99999 PR PBB SHADOW E&M-EST. PATIENT-LVL III: ICD-10-PCS | Mod: PBBFAC,,, | Performed by: FAMILY MEDICINE

## 2019-05-27 RX ORDER — DOXYCYCLINE 100 MG/1
CAPSULE ORAL
Refills: 0 | COMMUNITY
Start: 2019-05-21 | End: 2019-08-26 | Stop reason: HOSPADM

## 2019-05-27 RX ORDER — NEOMYCIN SULFATE, POLYMYXIN B SULFATE AND DEXAMETHASONE 3.5; 10000; 1 MG/ML; [USP'U]/ML; MG/ML
SUSPENSION/ DROPS OPHTHALMIC
Refills: 0 | COMMUNITY
Start: 2019-05-14 | End: 2019-08-26 | Stop reason: HOSPADM

## 2019-05-27 RX ORDER — NEOMYCIN SULFATE, POLYMYXIN B SULFATE, AND DEXAMETHASONE 3.5; 10000; 1 MG/G; [USP'U]/G; MG/G
OINTMENT OPHTHALMIC
Refills: 1 | COMMUNITY
Start: 2019-05-21 | End: 2019-08-26 | Stop reason: HOSPADM

## 2019-05-27 NOTE — PROGRESS NOTES
Subjective:       Patient ID: Sari Biswas is a 79 y.o. female.    Chief Complaint: Annual Exam    HPI     Here for a check up.     paf stable.     Requesting to get off the zoloft. Has been on it for the past 1 year.    hld stable.     Pt requesting to stop protonix. gerd currently stable.     Reports bulge on right inguinal area that happens infrequently over the past 3-4 years. Occurs after eating. Ct abdomen in 2016 showed small fat right inguinal hernia.     Review of Systems      Review of Systems   Constitutional: Negative for fever and chills.   HENT: Negative for hearing loss and neck stiffness.    Eyes: Negative for redness and itching.   Respiratory: Negative for cough and choking.    Cardiovascular: Negative for chest pain and leg swelling.  Abdomen: Negative for abdominal pain and blood in stool.   Genitourinary: Negative for dysuria and flank pain.   Musculoskeletal: Negative for back pain and gait problem.   Neurological: Negative for light-headedness and headaches.   Hematological: Negative for adenopathy.   Psychiatric/Behavioral: Negative for behavioral problems.     Objective:      Physical Exam   Constitutional: She is oriented to person, place, and time. She appears well-developed. No distress.   HENT:   Head: Normocephalic and atraumatic.   Mouth/Throat: Oropharynx is clear and moist.   Eyes: Pupils are equal, round, and reactive to light. Conjunctivae are normal.   Neck: Normal range of motion. Neck supple. No thyromegaly present.   Cardiovascular: Normal rate, regular rhythm and normal heart sounds. Exam reveals no friction rub.   No murmur heard.  Pulmonary/Chest: Effort normal and breath sounds normal. She has no wheezes. She has no rales.   Abdominal: Soft. Bowel sounds are normal. She exhibits no distension and no mass. There is no tenderness.   Musculoskeletal: Normal range of motion. She exhibits no edema or tenderness.   Lymphadenopathy:     She has no cervical adenopathy.    Neurological: She is alert and oriented to person, place, and time. She has normal reflexes. No cranial nerve deficit.   Skin: Skin is warm. No rash noted. No erythema.   Psychiatric: She has a normal mood and affect. Thought content normal.       Assessment:       1. Routine medical exam    2. Menopause    3. Chronic atrial fibrillation    4. Coronary artery disease, angina presence unspecified, unspecified vessel or lesion type, unspecified whether native or transplanted heart    5. Essential hypertension        Plan:       Routine medical exam    Menopause  -     DXA Bone Density Spine And Hip; Future; Expected date: 05/27/2019    Chronic atrial fibrillation    Coronary artery disease, angina presence unspecified, unspecified vessel or lesion type, unspecified whether native or transplanted heart    Essential hypertension                Plan:  Wean off zoloft over the next 3 weeks  Stop the protonix  See order  Cont all other meds        Medication List with Changes/Refills   Current Medications    ACETAMINOPHEN (TYLENOL) 325 MG TABLET    Take 2 tablets (650 mg total) by mouth every 6 (six) hours as needed.    BIOTIN ORAL    Take 1 capsule by mouth once daily.     CLOBETASOL (TEMOVATE) 0.05 % CREAM    Apply topically 2 (two) times daily.    CLOPIDOGREL (PLAVIX) 75 MG TABLET    Take 1 tablet (75 mg total) by mouth every other day.    CRANBERRY 400 MG CAP    Take 1 capsule by mouth once daily.     DOXYCYCLINE (VIBRAMYCIN) 100 MG CAP    1 CAPSULE EACH DAY BY MOUTH FOR 30 DAYS.    FUROSEMIDE (LASIX) 20 MG TABLET    Take 1 tablet (20 mg total) by mouth once daily.    NEOMYCIN-POLYMYXIN-DEXAMETHASONE (DEXACINE) 3.5 MG/G-10,000 UNIT/G-0.1 % OINT    LOCATION: RIGHT EYE. APPLY TO LIDS TWICE A DAY AND IN THE EYE ONCE A DAY, RIGHT EYE ONLY.    NEOMYCIN-POLYMYXIN-DEXAMETHASONE (MAXITROL) 3.5MG/ML-10,000 UNIT/ML-0.1 % DRPS    INSTILL 1 DROP INTO RIGHT EYE 4 TIMES A DAY FOR 5 DAYS THEN TWICE A DAY FOR 5 DAYS INTO RIGHT EYE     POTASSIUM CHLORIDE (MICRO-K) 10 MEQ CPSR    Take 1 capsule (10 mEq total) by mouth once daily. Saturday Sunday Tuesday Thursday    ROSUVASTATIN (CRESTOR) 5 MG TABLET    Take 1 tablet (5 mg total) by mouth every evening.    SACUBITRIL-VALSARTAN (ENTRESTO) 24-26 MG PER TABLET    Take 1 tablet by mouth 2 (two) times daily.    SERTRALINE (ZOLOFT) 25 MG TABLET    Take 1 tablet (25 mg total) by mouth 2 (two) times daily.    SPIRONOLACTONE (ALDACTONE) 25 MG TABLET    Take 0.5 tablets (12.5 mg total) by mouth once daily.    TOPROL XL 25 MG 24 HR TABLET    Take 1 tablet (25 mg total) by mouth once daily.    VIT C,R-MJ-AKOCZ-LUTEIN-ZEAXAN (PRESERVISION AREDS 2) [phone] MG-UNIT-MG-MG CAP    Take by mouth 2 (two) times daily.    VITAMIN D2 50,000 UNIT CAPSULE    TAKE 1 CAPSULE BY MOUTH THREE TIMES A WEEK  Fxasr   Discontinued Medications    PANTOPRAZOLE (PROTONIX) 40 MG TABLET    Take 1 tablet (40 mg total) by mouth once daily.

## 2019-05-30 ENCOUNTER — CLINICAL SUPPORT (OUTPATIENT)
Dept: CARDIOLOGY | Facility: CLINIC | Age: 79
End: 2019-05-30
Attending: INTERNAL MEDICINE
Payer: MEDICARE

## 2019-05-30 ENCOUNTER — TELEPHONE (OUTPATIENT)
Dept: CARDIOLOGY | Facility: CLINIC | Age: 79
End: 2019-05-30

## 2019-05-30 DIAGNOSIS — Z95.810 CARDIAC DEFIBRILLATOR IN SITU: ICD-10-CM

## 2019-05-30 DIAGNOSIS — I25.5 ISCHEMIC DILATED CARDIOMYOPATHY: ICD-10-CM

## 2019-05-30 DIAGNOSIS — I42.0 ISCHEMIC DILATED CARDIOMYOPATHY: ICD-10-CM

## 2019-05-30 NOTE — TELEPHONE ENCOUNTER
----- Message from Junior Evans sent at 5/30/2019  9:54 AM CDT -----  Contact: patient  Type: Needs Medical Advice    Who Called:  patient  Symptoms (please be specific):    How long has patient had these symptoms:    Pharmacy name and phone #:    Best Call Back Number: [phone]  Additional Information: called to advise that heart rate has been low,and thinks its the medication amiodarone spoke with cindy was advise to send message.

## 2019-06-04 ENCOUNTER — HOSPITAL ENCOUNTER (OUTPATIENT)
Dept: RADIOLOGY | Facility: HOSPITAL | Age: 79
Discharge: HOME | End: 2019-06-04
Attending: FAMILY MEDICINE
Payer: MEDICARE

## 2019-06-04 DIAGNOSIS — Z78.0 MENOPAUSE: ICD-10-CM

## 2019-06-04 PROCEDURE — 77080 DXA BONE DENSITY AXIAL: CPT | Mod: TC,PO

## 2019-06-04 PROCEDURE — 77080 DXA BONE DENSITY AXIAL: CPT | Mod: 26,,, | Performed by: RADIOLOGY

## 2019-06-04 PROCEDURE — 77080 DEXA BONE DENSITY SPINE HIP: ICD-10-PCS | Mod: 26,,, | Performed by: RADIOLOGY

## 2019-06-06 ENCOUNTER — TELEPHONE (OUTPATIENT)
Dept: CARDIOLOGY | Facility: CLINIC | Age: 79
End: 2019-06-06

## 2019-06-06 ENCOUNTER — NURSE TRIAGE (OUTPATIENT)
Dept: ADMINISTRATIVE | Facility: CLINIC | Age: 79
End: 2019-06-06

## 2019-06-06 NOTE — TELEPHONE ENCOUNTER
----- Message from Simone Nolasco sent at 6/6/2019  8:04 AM CDT -----  Contact: self   Placed call to pod, patient want to speak with a nurse regarding heart rate running mid 50's, sent to on call nurse also wanted to speak with speak as well please call back at [phone] (home)

## 2019-06-06 NOTE — TELEPHONE ENCOUNTER
Reason for Disposition   History of heart disease (i.e., heart attack, bypass surgery, angina, angioplasty)    Protocols used: ST HEART RATE AND HEARTBEAT [id number]    Pt reports that HR has been in the low 5o's for the past week or so. Pt is having periods of dizziness but denies any at this time. Per protocol patient should be seen today. Please contact patient to advise

## 2019-06-07 ENCOUNTER — LAB VISIT (OUTPATIENT)
Dept: LAB | Facility: HOSPITAL | Age: 79
End: 2019-06-07
Attending: INTERNAL MEDICINE
Payer: MEDICARE

## 2019-06-07 ENCOUNTER — DOCUMENTATION ONLY (OUTPATIENT)
Dept: CARDIOLOGY | Facility: CLINIC | Age: 79
End: 2019-06-07

## 2019-06-07 DIAGNOSIS — I50.43 ACUTE ON CHRONIC COMBINED SYSTOLIC (CONGESTIVE) AND DIASTOLIC (CONGESTIVE) HEART FAILURE: Primary | ICD-10-CM

## 2019-06-07 DIAGNOSIS — I50.43 ACUTE ON CHRONIC COMBINED SYSTOLIC (CONGESTIVE) AND DIASTOLIC (CONGESTIVE) HEART FAILURE: ICD-10-CM

## 2019-06-07 LAB
ANION GAP SERPL CALC-SCNC: 8 MMOL/L (ref 8–16)
BNP SERPL-MCNC: 681 PG/ML (ref 0–99)
BUN SERPL-MCNC: 22 MG/DL (ref 8–23)
CALCIUM SERPL-MCNC: 9.3 MG/DL (ref 8.7–10.5)
CHLORIDE SERPL-SCNC: 106 MMOL/L (ref 95–110)
CO2 SERPL-SCNC: 28 MMOL/L (ref 23–29)
CREAT SERPL-MCNC: 1 MG/DL (ref 0.5–1.4)
EST. GFR  (AFRICAN AMERICAN): >60 ML/MIN/1.73 M^2
EST. GFR  (NON AFRICAN AMERICAN): 53 ML/MIN/1.73 M^2
GLUCOSE SERPL-MCNC: 102 MG/DL (ref 70–110)
POTASSIUM SERPL-SCNC: 4.2 MMOL/L (ref 3.5–5.1)
SODIUM SERPL-SCNC: 142 MMOL/L (ref 136–145)

## 2019-06-07 PROCEDURE — 83880 ASSAY OF NATRIURETIC PEPTIDE: CPT

## 2019-06-07 PROCEDURE — 36415 COLL VENOUS BLD VENIPUNCTURE: CPT | Mod: PO

## 2019-06-07 PROCEDURE — 80048 BASIC METABOLIC PNL TOTAL CA: CPT | Mod: PO

## 2019-06-07 NOTE — PROGRESS NOTES
Pt called into clinic on 6/6/19 concerned that her heart rate has been in the 50's and she sometimes feels dizzy.  Discussed with Dr. Singleton, new orders rec'd to increase ICD base rate to 60 bpm.      Pt into pacemaker clinic on 6/7/19, Base rate increased from 50 bpm to 60 bpm, per Dr. Singleton's order.  Pt states she woke up this morning much more short of breath than she usually is.  Thoracic Impedance on ICD stable.  Pulse ox 97%.  Dr. Singleton notified, new orders rec'd for BNP, BMP to be done today.  Orders entered, pt notified, she will have labs done today.    She will notify clinic if her dizziness, SOB does not improve.

## 2019-06-15 RX ORDER — ALENDRONATE SODIUM 70 MG/1
70 TABLET ORAL
Qty: 4 TABLET | Refills: 11 | Status: SHIPPED | OUTPATIENT
Start: 2019-06-15 | End: 2019-07-12 | Stop reason: SDUPTHER

## 2019-06-15 NOTE — PROGRESS NOTES
inform pt via phone that I reviewed the test results and note the following:    bmd shows osteoporosis. I e sent in fosamax prescription to your pharmacy.

## 2019-06-17 ENCOUNTER — TELEPHONE (OUTPATIENT)
Dept: FAMILY MEDICINE | Facility: CLINIC | Age: 79
End: 2019-06-17

## 2019-06-17 NOTE — TELEPHONE ENCOUNTER
----- Message from Sari Gamez sent at 6/17/2019  3:01 PM CDT -----  Type:  Patient Returning Call    Who Called:  Patient  Who Left Message for Patient:  Mika  Does the patient know what this is regarding?:  no  Best Call Back Number:  [phone] (home)

## 2019-06-24 DIAGNOSIS — I10 ESSENTIAL HYPERTENSION: Primary | ICD-10-CM

## 2019-06-24 RX ORDER — AMIODARONE HYDROCHLORIDE 200 MG/1
200 TABLET ORAL DAILY
Qty: 90 TABLET | Refills: 3 | Status: SHIPPED | OUTPATIENT
Start: 2019-06-24 | End: 2020-03-02

## 2019-06-24 NOTE — PROGRESS NOTES
Refill Authorization Note     is requesting a refill authorization.    Brief assessment and rationale for refill: APPROVE: prr  Name and strength of medication: potassium chloride (MICRO-K) 10 MEQ CpSR       Medication Therapy Plan: Labs WNL 6/19; BP stable; approve 3 more    Medication reconciliation completed: No              How patient will take medication: Take 1 capsule (10 mEq total) by mouth once daily. Saturday Sunday Tuesday Thursday          Comments:   Last Potassium/Creatinine: (6 months)  Lab Results   Component Value Date    K 4.2 06/07/2019    K 5.0 04/18/2019    K 4.9 12/14/2018    Lab Results   Component Value Date    CREATININE 1.0 06/07/2019    CREATININE 1.0 04/18/2019    CREATININE 0.9 12/14/2018        APPOINTMENTS (past 12m or future 3m authorizing provider)  LAST VISIT DATE  Turner Anne MD 5/27/2019         NEXT VISIT DATE  Turner Anne MD 11/26/2019

## 2019-06-24 NOTE — TELEPHONE ENCOUNTER
----- Message from Gloria Chappell sent at 6/24/2019  8:22 AM CDT -----  Contact: self  Type:  RX Refill Request    Who Called:  patient  Refill or New Rx:  refill  RX Name and Strength:  potassium chloride (MICRO-K) 10 MEQ CpSR  How is the patient currently taking it? (ex. 1XDay):  1 every other day  Is this a 30 day or 90 day RX:  90  Preferred Pharmacy with phone number:    [facility] [place] [address] [address]  Phone: [phone] Fax: [phone]  Local or Mail Order:  Mail order  Ordering Provider:  Dr Omid Honeycutt Call Back Number:  [phone] (home)   Additional Information:  alexa

## 2019-06-24 NOTE — TELEPHONE ENCOUNTER
----- Message from Gloria Jorgejordan sent at 6/24/2019  8:24 AM CDT -----  Contact: self  Type:  RX Refill Request    Who Called:  patient  Refill or New Rx:  New RX  RX Name and Strength:  Amiodarone 200 MG  How is the patient currently taking it? (ex. 1XDay):  1/2 tab 2XDay  Is this a 30 day or 90 day RX:  90  Preferred Pharmacy with phone number:    [facility] [place] [address] [address]  Phone: [phone] Fax: [phone]  Local or Mail Order:  Mail order  Ordering Provider:  Dr Areli Honeycutt Call Back Number:  [phone] (home)   Additional Information:  Patient was put back on this medicine last time she saw the doctor, this is going [facility].  Thanks

## 2019-06-26 RX ORDER — POTASSIUM CHLORIDE 750 MG/1
10 CAPSULE, EXTENDED RELEASE ORAL DAILY
Qty: 48 CAPSULE | Refills: 0 | Status: SHIPPED | OUTPATIENT
Start: 2019-06-26 | End: 2019-09-14 | Stop reason: SDUPTHER

## 2019-07-01 ENCOUNTER — CLINICAL SUPPORT (OUTPATIENT)
Dept: CARDIOLOGY | Facility: CLINIC | Age: 79
End: 2019-07-01
Attending: INTERNAL MEDICINE
Payer: MEDICARE

## 2019-07-01 DIAGNOSIS — Z95.810 CARDIAC DEFIBRILLATOR IN SITU: ICD-10-CM

## 2019-07-01 DIAGNOSIS — I25.5 ISCHEMIC DILATED CARDIOMYOPATHY: ICD-10-CM

## 2019-07-01 DIAGNOSIS — I42.0 ISCHEMIC DILATED CARDIOMYOPATHY: ICD-10-CM

## 2019-07-08 DIAGNOSIS — I25.5 ISCHEMIC DILATED CARDIOMYOPATHY: ICD-10-CM

## 2019-07-08 DIAGNOSIS — I42.0 ISCHEMIC DILATED CARDIOMYOPATHY: ICD-10-CM

## 2019-07-08 DIAGNOSIS — Z95.810 CARDIAC DEFIBRILLATOR IN SITU: Primary | ICD-10-CM

## 2019-07-09 ENCOUNTER — TELEPHONE (OUTPATIENT)
Dept: FAMILY MEDICINE | Facility: CLINIC | Age: 79
End: 2019-07-09

## 2019-07-09 NOTE — TELEPHONE ENCOUNTER
----- Message from Sonja Michele sent at 7/9/2019  8:29 AM CDT -----  Contact: Self  Type: Needs Medical Advice    Who Called: Patient  Best Call Back Number: [phone] (cell)   Additional Information:Patient is calling in regards to her medication said the nurse needs to talk to the Dr please call to be advised.

## 2019-07-09 NOTE — TELEPHONE ENCOUNTER
"Spoke to pt. Stated I have a couple of questions:   1. The fosamax it was sent to [facility] is this something to stay on long term if so [facility] to [facility].     2. The potassium she is taking "will i be on it forever it is expensive".  Advised pt. I would send a message to the doctor and call her back pt. Verbalized understanding. Phone call ended. Please advise  "

## 2019-07-12 RX ORDER — ALENDRONATE SODIUM 70 MG/1
70 TABLET ORAL
Qty: 12 TABLET | Refills: 3 | Status: SHIPPED | OUTPATIENT
Start: 2019-07-12 | End: 2020-06-10

## 2019-07-12 NOTE — TELEPHONE ENCOUNTER
.inform pt via phone:    [facility]    Needs to call her insurance re: alternative to potassium.

## 2019-07-12 NOTE — TELEPHONE ENCOUNTER
Call placed to patient, advised of recommendations and discussed necessity of continued use of potassium.   Voiced understanding.

## 2019-07-12 NOTE — TELEPHONE ENCOUNTER
Informed patient Rx sent, states potassium is $200 she will find out if there is something cheaper.

## 2019-07-29 ENCOUNTER — CLINICAL SUPPORT (OUTPATIENT)
Dept: CARDIOLOGY | Facility: CLINIC | Age: 79
End: 2019-07-29
Attending: INTERNAL MEDICINE
Payer: MEDICARE

## 2019-07-29 DIAGNOSIS — I25.5 ISCHEMIC DILATED CARDIOMYOPATHY: ICD-10-CM

## 2019-07-29 DIAGNOSIS — I42.0 ISCHEMIC DILATED CARDIOMYOPATHY: ICD-10-CM

## 2019-07-29 DIAGNOSIS — Z95.810 CARDIAC DEFIBRILLATOR IN SITU: ICD-10-CM

## 2019-08-19 ENCOUNTER — CLINICAL SUPPORT (OUTPATIENT)
Dept: CARDIOLOGY | Facility: CLINIC | Age: 79
End: 2019-08-19
Attending: INTERNAL MEDICINE
Payer: MEDICARE

## 2019-08-19 VITALS
DIASTOLIC BLOOD PRESSURE: 72 MMHG | BODY MASS INDEX: 26.49 KG/M2 | SYSTOLIC BLOOD PRESSURE: 128 MMHG | WEIGHT: 159 LBS | HEIGHT: 65 IN | HEART RATE: 67 BPM

## 2019-08-19 DIAGNOSIS — I48.0 PAF (PAROXYSMAL ATRIAL FIBRILLATION): ICD-10-CM

## 2019-08-19 DIAGNOSIS — I10 ESSENTIAL HYPERTENSION: ICD-10-CM

## 2019-08-19 DIAGNOSIS — I25.110 CORONARY ARTERY DISEASE INVOLVING NATIVE CORONARY ARTERY OF NATIVE HEART WITH UNSTABLE ANGINA PECTORIS: ICD-10-CM

## 2019-08-19 DIAGNOSIS — I42.0 ISCHEMIC DILATED CARDIOMYOPATHY: ICD-10-CM

## 2019-08-19 DIAGNOSIS — I25.5 ISCHEMIC DILATED CARDIOMYOPATHY: ICD-10-CM

## 2019-08-19 PROCEDURE — 99999 PR PBB SHADOW E&M-EST. PATIENT-LVL II: CPT | Mod: PBBFAC,,,

## 2019-08-19 PROCEDURE — 93306 TTE W/DOPPLER COMPLETE: CPT | Mod: S$GLB,,, | Performed by: INTERNAL MEDICINE

## 2019-08-19 PROCEDURE — 99999 PR PBB SHADOW E&M-EST. PATIENT-LVL II: ICD-10-PCS | Mod: PBBFAC,,,

## 2019-08-19 PROCEDURE — 93306 TRANSTHORACIC ECHO (TTE) COMPLETE (CUPID ONLY): ICD-10-PCS | Mod: S$GLB,,, | Performed by: INTERNAL MEDICINE

## 2019-08-20 LAB
ASCENDING AORTA: 2.23 CM
AV INDEX (PROSTH): 0.59
AV MEAN GRADIENT: 4 MMHG
AV PEAK GRADIENT: 8 MMHG
AV VALVE AREA: 1.91 CM2
AV VELOCITY RATIO: 0.62
BSA FOR ECHO PROCEDURE: 1.82 M2
CV ECHO LV RWT: 0.37 CM
DOP CALC AO PEAK VEL: 1.38 M/S
DOP CALC AO VTI: 31.55 CM
DOP CALC LVOT AREA: 3.2 CM2
DOP CALC LVOT DIAMETER: 2.03 CM
DOP CALC LVOT PEAK VEL: 0.86 M/S
DOP CALC LVOT STROKE VOLUME: 60.14 CM3
DOP CALCLVOT PEAK VEL VTI: 18.59 CM
E WAVE DECELERATION TIME: 219.09 MSEC
E/A RATIO: 0.56
E/E' RATIO: 8.92 M/S
ECHO LV POSTERIOR WALL: 1.04 CM (ref 0.6–1.1)
FRACTIONAL SHORTENING: 25 % (ref 28–44)
INTERVENTRICULAR SEPTUM: 1.1 CM (ref 0.6–1.1)
IVRT: 0.16 MSEC
LA MAJOR: 5.55 CM
LA MINOR: 5.96 CM
LA WIDTH: 4.25 CM
LEFT ATRIUM SIZE: 4.29 CM
LEFT ATRIUM VOLUME INDEX: 49.6 ML/M2
LEFT ATRIUM VOLUME: 89.08 CM3
LEFT INTERNAL DIMENSION IN SYSTOLE: 4.24 CM (ref 2.1–4)
LEFT VENTRICLE DIASTOLIC VOLUME INDEX: 86.38 ML/M2
LEFT VENTRICLE DIASTOLIC VOLUME: 154.99 ML
LEFT VENTRICLE MASS INDEX: 135 G/M2
LEFT VENTRICLE SYSTOLIC VOLUME INDEX: 44.7 ML/M2
LEFT VENTRICLE SYSTOLIC VOLUME: 80.17 ML
LEFT VENTRICULAR INTERNAL DIMENSION IN DIASTOLE: 5.62 CM (ref 3.5–6)
LEFT VENTRICULAR MASS: 241.7 G
LV LATERAL E/E' RATIO: 7.25 M/S
LV SEPTAL E/E' RATIO: 11.6 M/S
MV PEAK A VEL: 1.03 M/S
MV PEAK E VEL: 0.58 M/S
PISA TR MAX VEL: 2.73 M/S
PULM VEIN S/D RATIO: 2.03
PV PEAK D VEL: 0.29 M/S
PV PEAK S VEL: 0.59 M/S
RA MAJOR: 5.11 CM
RA PRESSURE: 3 MMHG
RA WIDTH: 4.02 CM
RIGHT VENTRICULAR END-DIASTOLIC DIMENSION: 3.38 CM
SINUS: 2.81 CM
STJ: 1.92 CM
TDI LATERAL: 0.08 M/S
TDI SEPTAL: 0.05 M/S
TDI: 0.07 M/S
TR MAX PG: 30 MMHG
TRICUSPID ANNULAR PLANE SYSTOLIC EXCURSION: 1.56 CM
TV REST PULMONARY ARTERY PRESSURE: 33 MMHG

## 2019-08-26 ENCOUNTER — OFFICE VISIT (OUTPATIENT)
Dept: CARDIOLOGY | Facility: CLINIC | Age: 79
End: 2019-08-26
Payer: MEDICARE

## 2019-08-26 VITALS
BODY MASS INDEX: 26.77 KG/M2 | SYSTOLIC BLOOD PRESSURE: 101 MMHG | HEIGHT: 65 IN | WEIGHT: 160.69 LBS | DIASTOLIC BLOOD PRESSURE: 56 MMHG | HEART RATE: 71 BPM

## 2019-08-26 DIAGNOSIS — I42.0 ISCHEMIC DILATED CARDIOMYOPATHY: ICD-10-CM

## 2019-08-26 DIAGNOSIS — I48.0 PAF (PAROXYSMAL ATRIAL FIBRILLATION): Primary | ICD-10-CM

## 2019-08-26 DIAGNOSIS — Z95.810 ICD (IMPLANTABLE CARDIOVERTER-DEFIBRILLATOR) IN PLACE: ICD-10-CM

## 2019-08-26 DIAGNOSIS — I25.5 ISCHEMIC DILATED CARDIOMYOPATHY: ICD-10-CM

## 2019-08-26 DIAGNOSIS — E78.5 HYPERLIPIDEMIA, UNSPECIFIED HYPERLIPIDEMIA TYPE: ICD-10-CM

## 2019-08-26 DIAGNOSIS — Z95.1 S/P CABG (CORONARY ARTERY BYPASS GRAFT): ICD-10-CM

## 2019-08-26 DIAGNOSIS — I10 ESSENTIAL HYPERTENSION: ICD-10-CM

## 2019-08-26 PROCEDURE — 99999 PR PBB SHADOW E&M-EST. PATIENT-LVL III: CPT | Mod: PBBFAC,,, | Performed by: INTERNAL MEDICINE

## 2019-08-26 PROCEDURE — 99499 RISK ADDL DX/OHS AUDIT: ICD-10-PCS | Mod: S$GLB,,, | Performed by: INTERNAL MEDICINE

## 2019-08-26 PROCEDURE — 99999 PR PBB SHADOW E&M-EST. PATIENT-LVL III: ICD-10-PCS | Mod: PBBFAC,,, | Performed by: INTERNAL MEDICINE

## 2019-08-26 PROCEDURE — 99214 OFFICE O/P EST MOD 30 MIN: CPT | Mod: S$GLB,,, | Performed by: INTERNAL MEDICINE

## 2019-08-26 PROCEDURE — 99499 UNLISTED E&M SERVICE: CPT | Mod: S$GLB,,, | Performed by: INTERNAL MEDICINE

## 2019-08-26 PROCEDURE — 3074F PR MOST RECENT SYSTOLIC BLOOD PRESSURE < 130 MM HG: ICD-10-PCS | Mod: S$GLB,,, | Performed by: INTERNAL MEDICINE

## 2019-08-26 PROCEDURE — 3074F SYST BP LT 130 MM HG: CPT | Mod: S$GLB,,, | Performed by: INTERNAL MEDICINE

## 2019-08-26 PROCEDURE — 99214 PR OFFICE/OUTPT VISIT, EST, LEVL IV, 30-39 MIN: ICD-10-PCS | Mod: S$GLB,,, | Performed by: INTERNAL MEDICINE

## 2019-08-26 PROCEDURE — 3078F PR MOST RECENT DIASTOLIC BLOOD PRESSURE < 80 MM HG: ICD-10-PCS | Mod: S$GLB,,, | Performed by: INTERNAL MEDICINE

## 2019-08-26 PROCEDURE — 1101F PT FALLS ASSESS-DOCD LE1/YR: CPT | Mod: S$GLB,,, | Performed by: INTERNAL MEDICINE

## 2019-08-26 PROCEDURE — 1101F PR PT FALLS ASSESS DOC 0-1 FALLS W/OUT INJ PAST YR: ICD-10-PCS | Mod: S$GLB,,, | Performed by: INTERNAL MEDICINE

## 2019-08-26 PROCEDURE — 3078F DIAST BP <80 MM HG: CPT | Mod: S$GLB,,, | Performed by: INTERNAL MEDICINE

## 2019-08-26 RX ORDER — METOPROLOL SUCCINATE 25 MG/1
25 TABLET, EXTENDED RELEASE ORAL 2 TIMES DAILY
Qty: 20 TABLET | Refills: 0 | Status: SHIPPED | OUTPATIENT
Start: 2019-08-26 | End: 2019-08-27 | Stop reason: ALTCHOICE

## 2019-08-26 RX ORDER — METOPROLOL SUCCINATE 25 MG/1
25 TABLET, EXTENDED RELEASE ORAL 2 TIMES DAILY
Qty: 180 TABLET | Refills: 3 | Status: SHIPPED | OUTPATIENT
Start: 2019-08-26 | End: 2019-08-26 | Stop reason: SDUPTHER

## 2019-08-26 NOTE — PROGRESS NOTES
Subjective:    Patient ID:  Sari Biswas is a 79 y.o. female who presents for follow-up of ICM    HPI  She comes with no complaints, no chest pain, no shortness of breath  FC II    Review of Systems   Constitution: Negative for decreased appetite, malaise/fatigue, weight gain and weight loss.   Cardiovascular: Negative for chest pain, dyspnea on exertion, leg swelling, palpitations and syncope.   Respiratory: Negative for cough and shortness of breath.    Gastrointestinal: Negative.    Neurological: Negative for weakness.   All other systems reviewed and are negative.       Objective:      Physical Exam   Constitutional: She is oriented to person, place, and time. She appears well-developed and well-nourished.   HENT:   Head: Normocephalic.   Eyes: Pupils are equal, round, and reactive to light.   Neck: Normal range of motion. Neck supple. No JVD present. Carotid bruit is not present. No thyromegaly present.   Cardiovascular: Normal rate, regular rhythm, normal heart sounds, intact distal pulses and normal pulses. PMI is not displaced. Exam reveals no gallop.   No murmur heard.  Pulmonary/Chest: Effort normal and breath sounds normal.   Abdominal: Soft. Normal appearance. She exhibits no mass. There is no hepatosplenomegaly. There is no tenderness.   Musculoskeletal: Normal range of motion. She exhibits no edema.   Neurological: She is alert and oriented to person, place, and time. She has normal strength and normal reflexes. No sensory deficit.   Skin: Skin is warm and intact.   Psychiatric: She has a normal mood and affect.   Nursing note and vitals reviewed.    Echocardiogram reviewed      Assessment:       1. PAF (paroxysmal atrial fibrillation)    2. S/P CABG (coronary artery bypass graft)    3. Ischemic dilated cardiomyopathy    4. ICD (implantable cardioverter-defibrillator) in place    5. Hyperlipidemia, unspecified hyperlipidemia type    6. Essential hypertension         Plan:     Continue all cardiac  medications  Regular exercise program  Weight loss  6 m f/u with labs

## 2019-08-26 NOTE — TELEPHONE ENCOUNTER
Patient requesting generic medication of Toprol XL 25 mg, one tab by mouth two times daily to be called into the pharmacy

## 2019-08-26 NOTE — TELEPHONE ENCOUNTER
Medication change request forwared to doctor----- Message from Charity Julian sent at 8/26/2019  4:12 PM CDT -----  Contact: Patient  Type: Needs Medical Advice    Who Called:  Patient  Symptoms (please be specific):  na  How long has patient had these symptoms:  na  Pharmacy name and phone #:    [facility] [place] [address] [address]  Phone: [phone] Fax: [phone]     Best Call Back Number: [phone] (home)    Additional Information: States that she needs a new prescription for the generic medication that she was written a prescription for this morning, Toprol. Please call to advise/discuss, thank you!

## 2019-08-28 RX ORDER — METOPROLOL SUCCINATE 25 MG/1
25 TABLET, EXTENDED RELEASE ORAL 2 TIMES DAILY
Qty: 180 TABLET | Refills: 3 | Status: SHIPPED | OUTPATIENT
Start: 2019-08-28 | End: 2020-11-07

## 2019-08-28 RX ORDER — METOPROLOL SUCCINATE 50 MG/1
50 TABLET, EXTENDED RELEASE ORAL 2 TIMES DAILY
Qty: 180 TABLET | Refills: 3 | Status: SHIPPED | OUTPATIENT
Start: 2019-08-28 | End: 2019-09-04 | Stop reason: CLARIF

## 2019-08-28 NOTE — TELEPHONE ENCOUNTER
----- Message from Kalani Hidalgo sent at 8/28/2019  3:27 PM CDT -----  Contact: [phone]  Patient is requesting a call back from the nurse stated its in regards to her medication, patient stated she need a new prescription and want to know why her prescription was canceled.    Please call the patient upon request at phone number [phone].

## 2019-09-03 NOTE — TELEPHONE ENCOUNTER
----- Message from Dina Hankins sent at 9/3/2019  8:30 AM CDT -----  Type:  RX Refill Request    Who Called:  self  Refill or New Rx:  Refill   RX Name and Strength:   VITAMIN D2 50,000 unit capsule  How is the patient currently taking it? (ex. 1XDay):     Is this a 30 day or 90 day RX:     Preferred Pharmacy with phone number:    [id number] [place] - [address]  [address]  [place] [address]  Phone: [phone] Fax: [phone]       Local or Mail Order:  local   Ordering Provider:  Dr Omid Honeycutt Call Back Number:  [phone] (home)     Additional Information:

## 2019-09-04 NOTE — PROGRESS NOTES
Refill Authorization Note     is requesting a refill authorization.    Brief assessment and rationale for refill: DEFER; nco   Name and strength of medication: VITAMIN D2 50,000 unit capsule (ergocalciferol)  Medication-related problems identified: Therapeutic duplication    Medication Therapy Plan: Vitamin D/ Osteo- nco,  cakked pt regarding duplicate metoprolol, see call notes, Defer to you     Medication reconciliation completed: Yes   Pharmacist Review Requested: Yes          How patient will take medication: t1c po tiq (mw)          Comments:   Last Vitamin D >20ng/mL 12 months   Lab Results   Component Value Date    [id number] 42 04/18/2019         Last Calcium 12 months   Lab Results   Component Value Date    CALCIUM 9.3 06/07/2019    CALCIUM 9.1 04/18/2019    CALCIUM 9.4 12/14/2018         APPOINTMENTS past 12m or future 3m    Date Provider   LAST VISIT  5/27/2019 Turner Anne MD   NEXT VISIT  11/26/2019 Turner Anne MD

## 2019-09-04 NOTE — TELEPHONE ENCOUNTER
Number called: [phone]  Call Time: 11:10  Spoke with: Sari    Called to verify that current strength/ regimen pt is supposed to be taking regarding the metoprolol.     Patient is currently taking metoprolol 25mg t1t po BID    She stated that she is aware she received the 50mg in error and will cut those in half since [facility].     Advised her that we would ensure her chart reflected the current dosage.       Last Prescribed Info:     Ordering User:  Rodo Singleton MD               Pharmacy:  [facility] [address]        metoprolol succinate (TOPROL-XL) 25 MG 24 hr tablet 180 tablet 3 8/28/2019 8/27/2020    Sig - Route: Take 1 tablet (25 mg total) by mouth 2 (two) times daily. - Oral    Sent to pharmacy as: metoprolol succinate (TOPROL-XL) 25 MG 24 hr tablet    E-Prescribing Status: Receipt confirmed by pharmacy (8/28/2019  8:47 PM CDT)          metoprolol succinate (TOPROL-XL) 50 MG 24 hr tablet 180 tablet 3 8/28/2019 8/27/2020    Sig - Route: Take 1 tablet (50 mg total) by mouth 2 (two) times daily. Generic only - Oral    Sent to pharmacy as: metoprolol succinate (TOPROL-XL) 50 MG 24 hr tablet    E-Prescribing Status: Receipt confirmed by pharmacy (8/28/2019  8:43 PM CDT)

## 2019-09-05 RX ORDER — ERGOCALCIFEROL 1.25 MG/1
CAPSULE ORAL
Qty: 36 CAPSULE | Refills: 5 | Status: SHIPPED | OUTPATIENT
Start: 2019-09-05 | End: 2020-09-24 | Stop reason: SDUPTHER

## 2019-09-14 DIAGNOSIS — I10 ESSENTIAL HYPERTENSION: ICD-10-CM

## 2019-09-16 RX ORDER — POTASSIUM CHLORIDE 750 MG/1
CAPSULE, EXTENDED RELEASE ORAL
Qty: 48 CAPSULE | Refills: 0 | Status: SHIPPED | OUTPATIENT
Start: 2019-09-16 | End: 2019-11-30 | Stop reason: SDUPTHER

## 2019-09-16 NOTE — PROGRESS NOTES
Refill Authorization Note     is requesting a refill authorization.    Brief assessment and rationale for refill: APPROVE; prr   Name and strength of medication:  POT CHLOR ER CAPS 10MEQ       Medication Therapy Plan: LCO/LOV(5/19) cont. with current meds; labs wnl(6/19);FOVS; approve 3 more                    How patient will take medication: UTD           Comments:   Lab Results   Component Value Date    CREATININE 1.0 06/07/2019    BUN 22 06/07/2019     06/07/2019    K 4.2 06/07/2019     06/07/2019    CO2 28 06/07/2019       Appointments (past 12m or future 3m authorizing provider)   Date Provider   Last Visit 5/27/2019 Turner Anne MD   Next visit  11/26/2019 Turner Anne MD

## 2019-09-17 ENCOUNTER — CLINICAL SUPPORT (OUTPATIENT)
Dept: CARDIOLOGY | Facility: CLINIC | Age: 79
End: 2019-09-17
Payer: MEDICARE

## 2019-09-17 PROCEDURE — 93299 CARDIAC DEVICE CHECK - REMOTE: CPT | Mod: S$GLB,,, | Performed by: INTERNAL MEDICINE

## 2019-09-17 PROCEDURE — 93297 REM INTERROG DEV EVAL ICPMS: CPT | Mod: S$GLB,,, | Performed by: INTERNAL MEDICINE

## 2019-09-17 PROCEDURE — 93299 CARDIAC DEVICE CHECK - REMOTE: ICD-10-PCS | Mod: S$GLB,,, | Performed by: INTERNAL MEDICINE

## 2019-09-17 PROCEDURE — 93297 CARDIAC DEVICE CHECK - REMOTE: ICD-10-PCS | Mod: S$GLB,,, | Performed by: INTERNAL MEDICINE

## 2019-10-04 DIAGNOSIS — G89.29 CHRONIC PAIN OF RIGHT KNEE: Primary | ICD-10-CM

## 2019-10-04 DIAGNOSIS — M25.561 CHRONIC PAIN OF RIGHT KNEE: Primary | ICD-10-CM

## 2019-10-29 ENCOUNTER — IMMUNIZATION (OUTPATIENT)
Dept: FAMILY MEDICINE | Facility: CLINIC | Age: 79
End: 2019-10-29
Payer: MEDICARE

## 2019-10-29 PROCEDURE — 90662 IIV NO PRSV INCREASED AG IM: CPT | Mod: S$GLB,,, | Performed by: INTERNAL MEDICINE

## 2019-10-29 PROCEDURE — 90662 FLU VACCINE - HIGH DOSE (65+) PRESERVATIVE FREE IM: ICD-10-PCS | Mod: S$GLB,,, | Performed by: INTERNAL MEDICINE

## 2019-10-29 PROCEDURE — G0008 ADMIN INFLUENZA VIRUS VAC: HCPCS | Mod: S$GLB,,, | Performed by: INTERNAL MEDICINE

## 2019-10-29 PROCEDURE — G0008 FLU VACCINE - HIGH DOSE (65+) PRESERVATIVE FREE IM: ICD-10-PCS | Mod: S$GLB,,, | Performed by: INTERNAL MEDICINE

## 2019-11-16 ENCOUNTER — CLINICAL SUPPORT (OUTPATIENT)
Dept: CARDIOLOGY | Facility: CLINIC | Age: 79
End: 2019-11-16
Payer: MEDICARE

## 2019-11-19 ENCOUNTER — TELEPHONE (OUTPATIENT)
Dept: FAMILY MEDICINE | Facility: CLINIC | Age: 79
End: 2019-11-19

## 2019-11-19 ENCOUNTER — PES CALL (OUTPATIENT)
Dept: ADMINISTRATIVE | Facility: CLINIC | Age: 79
End: 2019-11-19

## 2019-11-19 NOTE — TELEPHONE ENCOUNTER
----- Message from Jamilah Horne sent at 11/19/2019 11:42 AM CST -----  Contact: patient  Type:  Patient Returning Call    Who Called:  Patient  Who Left Message for Patient:  Felicia  Does the patient know what this is regarding?:    Best Call Back Number:  [phone]  Additional Information:  Patient states that she will be leaving around 12:30 and is requesting nurse to leave a message for her

## 2019-11-19 NOTE — TELEPHONE ENCOUNTER
----- Message from Lamar Douglass sent at 11/19/2019  2:30 PM CST -----  Contact: self  Type:  Patient Returning Call    Who Called:pt  Who Left Message for Patient:renea  Does the patient know what this is regarding?:no  Would the patient rather a call back or a response via [email or web address]? Call back  Best Call Back Number:[phone]  Additional Information: none    Thanks,  Lamar Douglass

## 2019-11-21 ENCOUNTER — TELEPHONE (OUTPATIENT)
Dept: FAMILY MEDICINE | Facility: CLINIC | Age: 79
End: 2019-11-21

## 2019-11-21 NOTE — TELEPHONE ENCOUNTER
----- Message from Corrine Sanchez sent at 11/21/2019  8:57 AM CST -----  Contact: patient   Type:  Patient Returning Call    Who Called:  Patient   Who Left Message for Patient:  Marlena   Does the patient know what this is regarding?:  n/a  Best Call Back Number:  [phone] (home)   Additional Information: pt returning call from 11/19

## 2019-11-21 NOTE — TELEPHONE ENCOUNTER
Called pt, she is unsure why she was called prior on 11/19/19. I was unable to find any reason why. Checked appts, no need to reschedule.

## 2019-11-26 ENCOUNTER — HOSPITAL ENCOUNTER (OUTPATIENT)
Dept: RADIOLOGY | Facility: HOSPITAL | Age: 79
Discharge: HOME | End: 2019-11-26
Attending: FAMILY MEDICINE
Payer: MEDICARE

## 2019-11-26 ENCOUNTER — OFFICE VISIT (OUTPATIENT)
Dept: FAMILY MEDICINE | Facility: CLINIC | Age: 79
End: 2019-11-26
Payer: MEDICARE

## 2019-11-26 ENCOUNTER — TELEPHONE (OUTPATIENT)
Dept: FAMILY MEDICINE | Facility: CLINIC | Age: 79
End: 2019-11-26

## 2019-11-26 VITALS
DIASTOLIC BLOOD PRESSURE: 60 MMHG | HEART RATE: 71 BPM | BODY MASS INDEX: 27.1 KG/M2 | HEIGHT: 65 IN | WEIGHT: 162.69 LBS | SYSTOLIC BLOOD PRESSURE: 98 MMHG | OXYGEN SATURATION: 99 %

## 2019-11-26 DIAGNOSIS — I48.20 CHRONIC ATRIAL FIBRILLATION: Primary | ICD-10-CM

## 2019-11-26 DIAGNOSIS — I25.10 CORONARY ARTERY DISEASE, ANGINA PRESENCE UNSPECIFIED, UNSPECIFIED VESSEL OR LESION TYPE, UNSPECIFIED WHETHER NATIVE OR TRANSPLANTED HEART: ICD-10-CM

## 2019-11-26 DIAGNOSIS — R05.9 COUGH: ICD-10-CM

## 2019-11-26 DIAGNOSIS — I10 ESSENTIAL HYPERTENSION: ICD-10-CM

## 2019-11-26 DIAGNOSIS — Z12.39 BREAST CANCER SCREENING: ICD-10-CM

## 2019-11-26 PROCEDURE — 1101F PR PT FALLS ASSESS DOC 0-1 FALLS W/OUT INJ PAST YR: ICD-10-PCS | Mod: S$GLB,,, | Performed by: FAMILY MEDICINE

## 2019-11-26 PROCEDURE — 99214 OFFICE O/P EST MOD 30 MIN: CPT | Mod: S$GLB,,, | Performed by: FAMILY MEDICINE

## 2019-11-26 PROCEDURE — 1126F AMNT PAIN NOTED NONE PRSNT: CPT | Mod: S$GLB,,, | Performed by: FAMILY MEDICINE

## 2019-11-26 PROCEDURE — 71046 XR CHEST PA AND LATERAL: ICD-10-PCS | Mod: 26,,, | Performed by: RADIOLOGY

## 2019-11-26 PROCEDURE — 99999 PR PBB SHADOW E&M-EST. PATIENT-LVL III: CPT | Mod: PBBFAC,,, | Performed by: FAMILY MEDICINE

## 2019-11-26 PROCEDURE — 1101F PT FALLS ASSESS-DOCD LE1/YR: CPT | Mod: S$GLB,,, | Performed by: FAMILY MEDICINE

## 2019-11-26 PROCEDURE — 1126F PR PAIN SEVERITY QUANTIFIED, NO PAIN PRESENT: ICD-10-PCS | Mod: S$GLB,,, | Performed by: FAMILY MEDICINE

## 2019-11-26 PROCEDURE — 3078F PR MOST RECENT DIASTOLIC BLOOD PRESSURE < 80 MM HG: ICD-10-PCS | Mod: S$GLB,,, | Performed by: FAMILY MEDICINE

## 2019-11-26 PROCEDURE — 3074F SYST BP LT 130 MM HG: CPT | Mod: S$GLB,,, | Performed by: FAMILY MEDICINE

## 2019-11-26 PROCEDURE — 3074F PR MOST RECENT SYSTOLIC BLOOD PRESSURE < 130 MM HG: ICD-10-PCS | Mod: S$GLB,,, | Performed by: FAMILY MEDICINE

## 2019-11-26 PROCEDURE — 1159F PR MEDICATION LIST DOCUMENTED IN MEDICAL RECORD: ICD-10-PCS | Mod: S$GLB,,, | Performed by: FAMILY MEDICINE

## 2019-11-26 PROCEDURE — 1159F MED LIST DOCD IN RCRD: CPT | Mod: S$GLB,,, | Performed by: FAMILY MEDICINE

## 2019-11-26 PROCEDURE — 99999 PR PBB SHADOW E&M-EST. PATIENT-LVL III: ICD-10-PCS | Mod: PBBFAC,,, | Performed by: FAMILY MEDICINE

## 2019-11-26 PROCEDURE — 71046 X-RAY EXAM CHEST 2 VIEWS: CPT | Mod: 26,,, | Performed by: RADIOLOGY

## 2019-11-26 PROCEDURE — 3078F DIAST BP <80 MM HG: CPT | Mod: S$GLB,,, | Performed by: FAMILY MEDICINE

## 2019-11-26 PROCEDURE — 99214 PR OFFICE/OUTPT VISIT, EST, LEVL IV, 30-39 MIN: ICD-10-PCS | Mod: S$GLB,,, | Performed by: FAMILY MEDICINE

## 2019-11-26 PROCEDURE — 71046 X-RAY EXAM CHEST 2 VIEWS: CPT | Mod: TC,FY,PO

## 2019-11-26 NOTE — PROGRESS NOTES
Subjective:       Patient ID: Sari Biswas is a 79 y.o. female.    Chief Complaint: 6month follow up    HPI     Here for a f/u.     paf stable.     hld stable.     C/o cough x 2 weeks. Croupy. Feels cough is loose but unable to bring up.  No fever.  No wheeze or shortness of breath.  No sore throat or rhinorrhea.     Reports that she falls asleep at bed in evenings.  wakes her up 2 hours later so she can put their dog in kennel. Thereafter, hard to fall asleep.       Review of Systems      Review of Systems   Constitutional: Negative for fever and chills.   HENT: Negative for hearing loss and neck stiffness.    Eyes: Negative for redness and itching.   Respiratory: Negative for choking.    Cardiovascular: Negative for chest pain and leg swelling.  Abdomen: Negative for abdominal pain and blood in stool.   Genitourinary: Negative for dysuria and flank pain.   Musculoskeletal: Negative for back pain and gait problem.   Neurological: Negative for light-headedness and headaches.   Hematological: Negative for adenopathy.   Psychiatric/Behavioral: Negative for behavioral problems.     Objective:      Physical Exam   Constitutional: She appears well-developed.   HENT:   Head: Normocephalic and atraumatic.   Eyes: Pupils are equal, round, and reactive to light. Conjunctivae are normal.   Neck: Normal range of motion.   Cardiovascular: Normal rate and regular rhythm.   No murmur heard.  Pulmonary/Chest: Effort normal and breath sounds normal.   Musculoskeletal: She exhibits no edema.   Lymphadenopathy:     She has no cervical adenopathy.       Assessment:       1. Chronic atrial fibrillation    2. Breast cancer screening    3. Coronary artery disease, angina presence unspecified, unspecified vessel or lesion type, unspecified whether native or transplanted heart    4. Essential hypertension    5. Cough        Plan:       Chronic atrial fibrillation    Breast cancer screening  -     Mammo Digital Screening Bilat;  Future; Expected date: 11/26/2019    Coronary artery disease, angina presence unspecified, unspecified vessel or lesion type, unspecified whether native or transplanted heart    Essential hypertension    Cough  -     X-Ray Chest PA And Lateral; Future; Expected date: 11/26/2019                Plan:  cxray today  Cont current meds  Order mmg  Recommend placing dog in kennel before falling asleep at night    Medication List with Changes/Refills   Current Medications    ACETAMINOPHEN (TYLENOL) 325 MG TABLET    Take 2 tablets (650 mg total) by mouth every 6 (six) hours as needed.    ALENDRONATE (FOSAMAX) 70 MG TABLET    Take 1 tablet (70 mg total) by mouth every 7 days.    AMIODARONE (PACERONE) 200 MG TAB    Take 1 tablet (200 mg total) by mouth once daily.    BIOTIN ORAL    Take 1 capsule by mouth once daily.     CLOBETASOL (TEMOVATE) 0.05 % CREAM    Apply topically 2 (two) times daily.    CLOPIDOGREL (PLAVIX) 75 MG TABLET    Take 1 tablet (75 mg total) by mouth every other day.    CRANBERRY 400 MG CAP    Take 1 capsule by mouth once daily.     FUROSEMIDE (LASIX) 20 MG TABLET    Take 1 tablet (20 mg total) by mouth once daily.    METOPROLOL SUCCINATE (TOPROL-XL) 25 MG 24 HR TABLET    Take 1 tablet (25 mg total) by mouth 2 (two) times daily.    POTASSIUM CHLORIDE (MICRO-K) 10 MEQ CPSR    TAKE 1 CAPSULE DAILY ON SATURDAY, SUNDAY, TUESDAY AND THURSDAY    ROSUVASTATIN (CRESTOR) 5 MG TABLET    Take 1 tablet (5 mg total) by mouth every evening.    SACUBITRIL-VALSARTAN (ENTRESTO) 24-26 MG PER TABLET    Take 1 tablet by mouth 2 (two) times daily.    SPIRONOLACTONE (ALDACTONE) 25 MG TABLET    Take 0.5 tablets (12.5 mg total) by mouth once daily.    VIT C,O-DZ-LZPSP-LUTEIN-ZEAXAN (PRESERVISION AREDS 2) [phone] MG-UNIT-MG-MG CAP    Take by mouth 2 (two) times daily.    VITAMIN D2 50,000 UNIT CAPSULE    TAKE 1 CAPSULE BY MOUTH THREE TIMES A WEEK  [facility]

## 2019-11-26 NOTE — TELEPHONE ENCOUNTER
----- Message from Angelina Velasco sent at 11/26/2019  3:04 PM CST -----  Type:  Patient Returning Call    Who Called:  Sari  Who Left Message for Patient:  ?  Does the patient know what this is regarding?:  Results of xray  Best Call Back Number:  [phone]  Additional Information:  Thank you!

## 2019-11-27 ENCOUNTER — PES CALL (OUTPATIENT)
Dept: ADMINISTRATIVE | Facility: CLINIC | Age: 79
End: 2019-11-27

## 2019-11-30 DIAGNOSIS — I10 ESSENTIAL HYPERTENSION: ICD-10-CM

## 2019-12-03 RX ORDER — POTASSIUM CHLORIDE 750 MG/1
CAPSULE, EXTENDED RELEASE ORAL
Qty: 48 CAPSULE | Refills: 0 | Status: SHIPPED | OUTPATIENT
Start: 2019-12-03 | End: 2020-01-27 | Stop reason: SDUPTHER

## 2019-12-03 NOTE — PROGRESS NOTES
Refill Authorization Note     is requesting a refill authorization.    Brief assessment and rationale for refill: APPROVE: prr  Name and strength of medication: potassium chloride (MICRO-K) 10 MEQ CpSR            Medication reconciliation completed: No              How patient will take medication: tad 4x weekly           Comments:   Requested Prescriptions   Pending Prescriptions Disp Refills    potassium chloride (MICRO-K) 10 MEQ CpSR [Pharmacy Med Name: POT CHLOR ER CAPS 10MEQ] 48 capsule 0     Sig: TAKE 1 CAPSULE DAILY ON SATURDAY, SUNDAY, TUESDAY AND THURSDAY       Endocrinology:  Minerals - Potassium Supplementation Passed - 12/3/2019 11:45 AM        Passed - Patient is at least 18 years old        Passed - Office visit in past 12 months or future 90 days     Recent Outpatient Visits            1 week ago Chronic atrial fibrillation    [facility] Turner Anne MD    3 months ago PAF (paroxysmal atrial fibrillation)    [facility] Rodo Singleton MD    6 months ago Routine medical exam    [facility] Turner Anne MD    6 months ago PAF (paroxysmal atrial fibrillation)    [facility] Rodo Singleton MD    7 months ago Paroxysmal atrial fibrillation    [place] - Arrhythmia Bonilla Fitzgerald MD          Future Appointments              In 1 week Luci Costa MD [facility] [place]    In 1 week [facility]    In 1 month [facility] [place]    In 2 months HOME MONITOR DEVICE CHECK, [facility] [place] - Card[facility] [place]    In 3 months [facility]    In 5 months Turner Anne MD [facility]                Passed - K in normal range and within 180 days     POC Potassium   Date Value Ref Range Status   05/01/2019 4.2 3.6 - 5.1 mmol/L Final    07/11/2018 2.9 (L) 3.5 - 5.1 mmol/L Final   07/11/2018 3.2 (L) 3.5 - 5.1 mmol/L Final     Potassium   Date Value Ref Range Status   06/07/2019 4.2 3.5 - 5.1 mmol/L Final   04/18/2019 5.0 3.5 - 5.1 mmol/L Final   12/14/2018 4.9 3.5 - 5.1 mmol/L Final              Passed - Cr is 1.4 or below and within 180 days     Creatinine   Date Value Ref Range Status   06/07/2019 1.0 0.5 - 1.4 mg/dL Final   04/18/2019 1.0 0.5 - 1.4 mg/dL Final   12/14/2018 0.9 0.5 - 1.4 mg/dL Final     POC Creatinine   Date Value Ref Range Status   05/01/2019 1.0 0.6 - 1.2 mg/dL Final              Passed - eGFR within 180 days     POC eGFR   Date Value Ref Range Status   05/01/2019 55 (L) 61 - 2,000 mL/min Final     eGFR if non    Date Value Ref Range Status   06/07/2019 53 (A) >60 mL/min/1.73 m^2 Final     Comment:     Calculation used to obtain the estimated glomerular filtration  rate (eGFR) is the CKD-EPI equation.      04/18/2019 53.7 (A) >60 mL/min/1.73 m^2 Final     Comment:     Calculation used to obtain the estimated glomerular filtration  rate (eGFR) is the CKD-EPI equation.      12/14/2018 >60.0 >60 mL/min/1.73 m^2 Final     Comment:     Calculation used to obtain the estimated glomerular filtration  rate (eGFR) is the CKD-EPI equation.                   Appointments with PCP in past 18 months or upcoming 3 months     Date Provider   Last Visit   11/26/2019 Turner Anne MD   Next Visit   5/26/2020 Turner Anne MD

## 2019-12-11 ENCOUNTER — OFFICE VISIT (OUTPATIENT)
Dept: OBSTETRICS AND GYNECOLOGY | Facility: CLINIC | Age: 79
End: 2019-12-11
Payer: MEDICARE

## 2019-12-11 VITALS — WEIGHT: 163.56 LBS | BODY MASS INDEX: 27.25 KG/M2 | HEIGHT: 65 IN

## 2019-12-11 DIAGNOSIS — Z01.419 ROUTINE GYNECOLOGICAL EXAMINATION: Primary | ICD-10-CM

## 2019-12-11 DIAGNOSIS — N81.6 RECTOCELE: ICD-10-CM

## 2019-12-11 DIAGNOSIS — L72.3 SEBACEOUS CYST: ICD-10-CM

## 2019-12-11 PROCEDURE — 99999 PR PBB SHADOW E&M-EST. PATIENT-LVL II: CPT | Mod: PBBFAC,,, | Performed by: OBSTETRICS & GYNECOLOGY

## 2019-12-11 PROCEDURE — G0101 CA SCREEN;PELVIC/BREAST EXAM: HCPCS | Mod: S$GLB,,, | Performed by: OBSTETRICS & GYNECOLOGY

## 2019-12-11 PROCEDURE — G0101 PR CA SCREEN;PELVIC/BREAST EXAM: ICD-10-PCS | Mod: S$GLB,,, | Performed by: OBSTETRICS & GYNECOLOGY

## 2019-12-11 PROCEDURE — 99999 PR PBB SHADOW E&M-EST. PATIENT-LVL II: ICD-10-PCS | Mod: PBBFAC,,, | Performed by: OBSTETRICS & GYNECOLOGY

## 2019-12-11 NOTE — PROGRESS NOTES
Chief Complaint   Patient presents with    [facility]    Well Woman     History of Present Illness: Sari Biswas is a 79 y.o. female that presents today 12/11/2019 for well gyn visit.    Past Medical History:   Diagnosis Date    Anticoagulant long-term use     Arthritis     Cancer     CHF (congestive heart failure)     COPD (chronic obstructive pulmonary disease)     Coronary artery disease     Coronary artery disease involving native coronary artery of native heart 7/21/2018    Depression     DVT (deep venous thrombosis) 2012    left calf    Encounter for blood transfusion     GERD (gastroesophageal reflux disease)     History of recurrent UTIs     Hypertension     Paroxysmal atrial fibrillation     Skin cancer     SVT (supraventricular tachycardia)        Past Surgical History:   Procedure Laterality Date    ABLATION N/A 10/26/2018    Procedure: Ablation;  Surgeon: Bonilla Fitzgerald MD;  Location: [facility] CATH LAB;  Service: Cardiology;  Laterality: N/A;  AFL, LOUANN, RFA, PAVAN, MAC, SK ,3prep *Anticipate left femoral approach*    APPENDECTOMY      ATHERECTOMY N/A 7/11/2018    Procedure: Atherectomy;  Surgeon: Kumar Colon MD;  [email or web address];  Service: Cardiology;  Laterality: N/A;    BREAST BIOPSY Left     over 10 yrs. ago    CARDIAC CATHETERIZATION      CARDIAC SURGERY      angiogram    CATARACT EXTRACTION W/  INTRAOCULAR LENS IMPLANT      CHOLECYSTECTOMY      CORONARY ANGIOGRAPHY N/A 6/19/2018    Procedure: ANGIOGRAM-CORONARY;  Surgeon: Kumar Colon MD;  Location: [email or web address] CATH;  Service: Cardiology;  Laterality: N/A;    CORONARY ARTERY BYPASS GRAFT      CORONARY ARTERY BYPASS GRAFT (CABG) N/A 7/11/2018    Procedure: CREATION, BYPASS, ARTERIAL, AORTA TO CORONARY, USING GRAFT  [facility];  Surgeon: Aurora Hunter MD;  Location: [email or web address] OR;  Service: Cardiovascular;  Laterality: N/A;  emergency    CORONARY STENT PLACEMENT N/A 7/11/2018    Procedure: Stent DIXIE coronary;  Surgeon: Kumar  JOCELYNE Colon MD;  Location: [facility] CATH;  Service: Cardiology;  Laterality: N/A;    EYE SURGERY      HYSTERECTOMY      INSERTION OF INTRAVASCULAR MICROAXIAL BLOOD PUMP N/A 7/11/2018    Procedure: INSERTION, IMPELLA;  Surgeon: Kumar Colon MD;  [facility];  Service: Cardiology;  Laterality: N/A;    INSERTION OF TEMPORARY PACEMAKER N/A 7/11/2018    Procedure: INSERTION, PACEMAKER, TEMPORARY;  Surgeon: Kumar Colon MD;  [facility] CATH;  Service: Cardiology;  Laterality: N/A;    KNEE ARTHROSCOPY W/ DEBRIDEMENT  2012    LEFT HEART CATHETERIZATION Left 6/19/2018    Procedure: Left heart cath;  Surgeon: Kumar Colon MD;  [facility] CATH;  Service: Cardiology;  Laterality: Left;    LEFT HEART CATHETERIZATION Left 7/11/2018    Procedure: Left heart cath;  Surgeon: Kumar Colon MD;  [facility] CATH;  Service: Cardiology;  Laterality: Left;    OOPHORECTOMY      PERICARDIOCENTESIS N/A 7/11/2018    Procedure: Pericardiocentesis;  Surgeon: Kumar Colon MD;  [facility] CATH;  Service: Cardiology;  Laterality: N/A;    TRANSESOPHAGEAL ECHOCARDIOGRAPHY N/A 10/26/2018    Procedure: ECHOCARDIOGRAM, TRANSESOPHAGEAL;  Surgeon: Bonilla Fitzgerald MD;  Location: [facility] CATH LAB;  Service: Cardiology;  Laterality: N/A;    TREATMENT OF CARDIAC ARRHYTHMIA N/A 10/8/2018    Procedure: Cardioversion/Defibrillation;  Surgeon: Rodo Singleton MD;  [facility];  Service: Cardiology;  Laterality: N/A;       Current Outpatient Medications   Medication Sig Dispense Refill    acetaminophen (TYLENOL) 325 MG tablet Take 2 tablets (650 mg total) by mouth every 6 (six) hours as needed.  0    alendronate (FOSAMAX) 70 MG tablet Take 1 tablet (70 mg total) by mouth every 7 days. 12 tablet 3    amiodarone (PACERONE) 200 MG Tab Take 1 tablet (200 mg total) by mouth once daily. 90 tablet 3    BIOTIN ORAL Take 1 capsule by mouth once daily.       clopidogrel (PLAVIX) 75 mg tablet Take 1 tablet (75  "mg total) by mouth every other day. 46 tablet 3    cranberry 400 mg Cap Take 1 capsule by mouth once daily.       furosemide (LASIX) 20 MG tablet Take 1 tablet (20 mg total) by mouth once daily. 90 tablet 3    metoprolol succinate (TOPROL-XL) 25 MG 24 hr tablet Take 1 tablet (25 mg total) by mouth 2 (two) times daily. 180 tablet 3    potassium chloride (MICRO-K) 10 MEQ CpSR TAKE 1 CAPSULE DAILY ON SATURDAY, SUNDAY, TUESDAY AND THURSDAY 48 capsule 0    rosuvastatin (CRESTOR) 5 MG tablet Take 1 tablet (5 mg total) by mouth every evening. 90 tablet 3    sacubitril-valsartan (ENTRESTO) 24-26 mg per tablet Take 1 tablet by mouth 2 (two) times daily. 180 tablet 3    spironolactone (ALDACTONE) 25 MG tablet Take 0.5 tablets (12.5 mg total) by mouth once daily. 46 tablet 3    vit C,E-Mw-jnjwn-lutein-zeaxan (PRESERVISION AREDS 2) 236-394-96-1 mg-unit-mg-mg Cap Take by mouth 2 (two) times daily.      VITAMIN D2 50,000 unit capsule TAKE 1 CAPSULE BY MOUTH THREE TIMES A WEEK  MWFxasr 36 capsule 5    clobetasol (TEMOVATE) 0.05 % cream Apply topically 2 (two) times daily. (Patient not taking: Reported on 12/11/2019) 60 g 5     No current facility-administered medications for this visit.        Review of patient's allergies indicates:   Allergen Reactions    Bactrim [sulfamethoxazole-trimethoprim] Anxiety     "It makes me jittery."       Family History   Problem Relation Age of Onset    Cancer Father         lung    Cancer Brother         brain    Cancer Sister         breast    Breast cancer Sister 78    Breast cancer Paternal Aunt        Social History     Socioeconomic History    Marital status:      Spouse name: Not on file    Number of children: Not on file    Years of education: Not on file    Highest education level: Not on file   Occupational History    Not on file   Social Needs    Financial resource strain: Not on file    Food insecurity:     Worry: Not on file     Inability: Not on file    " "Transportation needs:     Medical: Not on file     Non-medical: Not on file   Tobacco Use    Smoking status: Former Smoker     Last attempt to quit:      Years since quittin.9    Smokeless tobacco: Never Used    Tobacco comment: quit smoking in .   Substance and Sexual Activity    Alcohol use: Yes     Alcohol/week: 1.0 standard drinks     Types: 1 Glasses of wine per week     Comment: seldom    Drug use: No    Sexual activity: Not on file   Lifestyle    Physical activity:     Days per week: Not on file     Minutes per session: Not on file    Stress: Not on file   Relationships    Social connections:     Talks on phone: Not on file     Gets together: Not on file     Attends Taoist service: Not on file     Active member of club or organization: Not on file     Attends meetings of clubs or organizations: Not on file     Relationship status: Not on file   Other Topics Concern    Not on file   Social History Narrative    Not on file       OB History    Para Term  AB Living   3 0 0   1     SAB TAB Ectopic Multiple Live Births   1              # Outcome Date GA Lbr Gilbert/2nd Weight Sex Delivery Anes PTL Lv   3       Vag-Spont      2       Vag-Spont      1 SAB                Review of Symptoms:  GENERAL: Denies weight gain or weight loss. Feeling well overall.   SKIN: Denies rash or lesions.   HEAD: Denies head injury or headache.   NODES: Denies enlarged lymph nodes.   CHEST: Denies chest pain or shortness of breath.   CARDIOVASCULAR: Denies palpitations or left sided chest pain.   ABDOMEN: No abdominal pain, constipation, diarrhea, nausea, vomiting or rectal bleeding.   URINARY: No frequency, dysuria, hematuria, or burning on urination.  HEMATOLOGIC: No easy bruisability or excessive bleeding.   MUSCULOSKELETAL: Denies joint pain or swelling.     Ht 5' 5" (1.651 m)   Wt 74.2 kg (163 lb 9.3 oz)   Physical Exam:  APPEARANCE: Well nourished, well developed, in no acute " distress.  SKIN: Normal skin turgor, no lesions.  NECK: Neck symmetric without masses   RESPIRATORY: Normal respiratory effort with no retractions or use of accessory muscles  CARDIOVASCULAR: Peripheral vascular system with no swelling no varicosities and palpation of pulses normal  LYMPHATIC: No enlargements of the lymph nodes noted in the neck, axillae, or groin  ABDOMEN: Soft. No tenderness or masses. No hepatosplenomegaly. No hernias.  BREASTS: Symmetrical, no skin changes or visible lesions. No palpable masses, nipple discharge or adenopathy bilaterally.  PELVIC: Normal external female genitalia without lesions. Normal hair distribution. Adequate perineal body, normal urethral meatus. Urethra with no masses.  Bladder nontender. Vagina moist and well rugated without lesions or discharge. Grade 1-2 rectocele.  Adnexa without masses or tenderness. Urethra and bladder normal.   EXTREMITIES: No clubbing cyanosis or edema.    ASSESSMENT/PLAN:  Routine gynecological examination    Rectocele    Sebaceous cyst          Patient was counseled today on Pap guidelines. We discussed the discontinuing the pap smear after hysterectomy except in certain high risk cases.  We discussed the need for pelvic exams.   We discussed STD screening if at high risk for an STD.  We discussed breast cancer screening with mammograms every other year after the age of 40 and annually after the age of 50.    We discussed colon cancer screening.   Osteoporosis screening with the Dexa Bone Scan discussed when indicated.   She will see her PCP for other health maintenance.       FOLLOW-UP:prn

## 2019-12-16 ENCOUNTER — CLINICAL SUPPORT (OUTPATIENT)
Dept: CARDIOLOGY | Facility: CLINIC | Age: 79
End: 2019-12-16
Payer: MEDICARE

## 2019-12-16 PROCEDURE — 93299 CARDIAC DEVICE CHECK - REMOTE: ICD-10-PCS | Mod: S$GLB,,, | Performed by: INTERNAL MEDICINE

## 2019-12-16 PROCEDURE — 93297 REM INTERROG DEV EVAL ICPMS: CPT | Mod: S$GLB,,, | Performed by: INTERNAL MEDICINE

## 2019-12-16 PROCEDURE — 93297 CARDIAC DEVICE CHECK - REMOTE: ICD-10-PCS | Mod: S$GLB,,, | Performed by: INTERNAL MEDICINE

## 2019-12-16 PROCEDURE — 93299 CARDIAC DEVICE CHECK - REMOTE: CPT | Mod: S$GLB,,, | Performed by: INTERNAL MEDICINE

## 2020-01-07 ENCOUNTER — HOSPITAL ENCOUNTER (OUTPATIENT)
Dept: RADIOLOGY | Facility: HOSPITAL | Age: 80
Discharge: HOME | End: 2020-01-07
Attending: FAMILY MEDICINE
Payer: MEDICARE

## 2020-01-07 DIAGNOSIS — Z12.31 BREAST CANCER SCREENING BY MAMMOGRAM: ICD-10-CM

## 2020-01-07 PROCEDURE — 77067 SCR MAMMO BI INCL CAD: CPT | Mod: 26,,, | Performed by: RADIOLOGY

## 2020-01-07 PROCEDURE — 77063 BREAST TOMOSYNTHESIS BI: CPT | Mod: 26,,, | Performed by: RADIOLOGY

## 2020-01-07 PROCEDURE — 77067 SCR MAMMO BI INCL CAD: CPT | Mod: TC,PO

## 2020-01-07 PROCEDURE — 77063 MAMMO DIGITAL SCREENING BILAT WITH TOMOSYNTHESIS_CAD: ICD-10-PCS | Mod: 26,,, | Performed by: RADIOLOGY

## 2020-01-07 PROCEDURE — 77067 MAMMO DIGITAL SCREENING BILAT WITH TOMOSYNTHESIS_CAD: ICD-10-PCS | Mod: 26,,, | Performed by: RADIOLOGY

## 2020-01-27 DIAGNOSIS — I10 ESSENTIAL HYPERTENSION: ICD-10-CM

## 2020-01-28 RX ORDER — FUROSEMIDE 20 MG/1
20 TABLET ORAL DAILY
Qty: 90 TABLET | Refills: 3 | Status: SHIPPED | OUTPATIENT
Start: 2020-01-28 | End: 2021-01-02 | Stop reason: SDUPTHER

## 2020-01-28 RX ORDER — ROSUVASTATIN CALCIUM 5 MG/1
5 TABLET, COATED ORAL NIGHTLY
Qty: 90 TABLET | Refills: 3 | Status: SHIPPED | OUTPATIENT
Start: 2020-01-28 | End: 2021-02-11

## 2020-01-28 RX ORDER — SPIRONOLACTONE 25 MG/1
12.5 TABLET ORAL DAILY
Qty: 46 TABLET | Refills: 3 | Status: SHIPPED | OUTPATIENT
Start: 2020-01-28 | End: 2021-01-02 | Stop reason: SDUPTHER

## 2020-01-29 RX ORDER — POTASSIUM CHLORIDE 750 MG/1
CAPSULE, EXTENDED RELEASE ORAL
Qty: 48 CAPSULE | Refills: 0 | Status: SHIPPED | OUTPATIENT
Start: 2020-01-29 | End: 2020-05-08

## 2020-01-29 NOTE — PROGRESS NOTES
Refill Authorization Note     is requesting a refill authorization.    Brief assessment and rationale for refill: APPROVE: needs labs     Medication-related problems identified: Requires labs    Medication Therapy Plan: NTBO(CMP)                              Comments:   Requested Prescriptions   Pending Prescriptions Disp Refills    potassium chloride (MICRO-K) 10 MEQ CpSR 144 capsule 0     Sig: TAKE 1 CAPSULE DAILY ON SATURDAY, SUNDAY, TUESDAY AND THURSDAY       Endocrinology:  Minerals - Potassium Supplementation Failed - 1/27/2020 10:59 AM        Failed - K in normal range and within 180 days     POC Potassium   Date Value Ref Range Status   05/01/2019 4.2 3.6 - 5.1 mmol/L Final   07/11/2018 2.9 (L) 3.5 - 5.1 mmol/L Final   07/11/2018 3.2 (L) 3.5 - 5.1 mmol/L Final     Potassium   Date Value Ref Range Status   06/07/2019 4.2 3.5 - 5.1 mmol/L Final   04/18/2019 5.0 3.5 - 5.1 mmol/L Final   12/14/2018 4.9 3.5 - 5.1 mmol/L Final              Failed - Cr is 1.4 or below and within 180 days     Creatinine   Date Value Ref Range Status   06/07/2019 1.0 0.5 - 1.4 mg/dL Final   04/18/2019 1.0 0.5 - 1.4 mg/dL Final   12/14/2018 0.9 0.5 - 1.4 mg/dL Final     POC Creatinine   Date Value Ref Range Status   05/01/2019 1.0 0.6 - 1.2 mg/dL Final              Failed - eGFR within 180 days     POC eGFR   Date Value Ref Range Status   05/01/2019 55 (L) 61 - 2,000 mL/min Final     eGFR if non    Date Value Ref Range Status   06/07/2019 53 (A) >60 mL/min/1.73 m^2 Final     Comment:     Calculation used to obtain the estimated glomerular filtration  rate (eGFR) is the CKD-EPI equation.      04/18/2019 53.7 (A) >60 mL/min/1.73 m^2 Final     Comment:     Calculation used to obtain the estimated glomerular filtration  rate (eGFR) is the CKD-EPI equation.      12/14/2018 >60.0 >60 mL/min/1.73 m^2 Final     Comment:     Calculation used to obtain the estimated glomerular filtration  rate (eGFR) is the CKD-EPI  equation.        eGFR if    Date Value Ref Range Status   06/07/2019 >60 >60 mL/min/1.73 m^2 Final   04/18/2019 >60.0 >60 mL/min/1.73 m^2 Final   12/14/2018 >60.0 >60 mL/min/1.73 m^2 Final              Passed - Patient is at least 18 years old        Passed - Office visit in past 12 months or future 90 days     Recent Outpatient Visits            1 month ago Routine gynecological examination    [facility] Luci Costa MD    2 months ago Chronic atrial fibrillation    [facility] Turner Anne MD    5 months ago PAF (paroxysmal atrial fibrillation)    [facility]iology Rodo Singleton MD    8 months ago Routine medical exam    [facility] Turner Anne MD    8 months ago PAF (paroxysmal atrial fibrillation)    [facility] Cardiology Rodo Singleton MD          Future Appointments              In 2 weeks HOME MONITOR DEVICE CHECK, [facility]    In 3 weeks HOME MONITOR DEVICE CHECK, [facility]    In 1 month MD [facility]  [facility]    In 3 months Turner Anne MD [facility]

## 2020-02-09 RX ORDER — CLOPIDOGREL BISULFATE 75 MG/1
TABLET ORAL
Qty: 46 TABLET | Refills: 4 | Status: SHIPPED | OUTPATIENT
Start: 2020-02-09 | End: 2021-05-31 | Stop reason: SDUPTHER

## 2020-03-02 ENCOUNTER — OFFICE VISIT (OUTPATIENT)
Dept: CARDIOLOGY | Facility: CLINIC | Age: 80
End: 2020-03-02
Payer: MEDICARE

## 2020-03-02 VITALS
BODY MASS INDEX: 27.73 KG/M2 | DIASTOLIC BLOOD PRESSURE: 53 MMHG | WEIGHT: 166.44 LBS | SYSTOLIC BLOOD PRESSURE: 92 MMHG | HEIGHT: 65 IN | HEART RATE: 68 BPM

## 2020-03-02 DIAGNOSIS — Z95.810 ICD (IMPLANTABLE CARDIOVERTER-DEFIBRILLATOR) IN PLACE: ICD-10-CM

## 2020-03-02 DIAGNOSIS — I25.10 CORONARY ARTERY DISEASE INVOLVING NATIVE CORONARY ARTERY OF NATIVE HEART WITHOUT ANGINA PECTORIS: Primary | ICD-10-CM

## 2020-03-02 DIAGNOSIS — E78.5 HYPERLIPIDEMIA, UNSPECIFIED HYPERLIPIDEMIA TYPE: ICD-10-CM

## 2020-03-02 DIAGNOSIS — I10 ESSENTIAL HYPERTENSION: ICD-10-CM

## 2020-03-02 DIAGNOSIS — Z95.1 S/P CABG (CORONARY ARTERY BYPASS GRAFT): ICD-10-CM

## 2020-03-02 DIAGNOSIS — I48.0 PAF (PAROXYSMAL ATRIAL FIBRILLATION): ICD-10-CM

## 2020-03-02 PROCEDURE — 1159F PR MEDICATION LIST DOCUMENTED IN MEDICAL RECORD: ICD-10-PCS | Mod: S$GLB,,, | Performed by: INTERNAL MEDICINE

## 2020-03-02 PROCEDURE — 99999 PR PBB SHADOW E&M-EST. PATIENT-LVL III: CPT | Mod: PBBFAC,,, | Performed by: INTERNAL MEDICINE

## 2020-03-02 PROCEDURE — 3078F DIAST BP <80 MM HG: CPT | Mod: S$GLB,,, | Performed by: INTERNAL MEDICINE

## 2020-03-02 PROCEDURE — 1101F PT FALLS ASSESS-DOCD LE1/YR: CPT | Mod: S$GLB,,, | Performed by: INTERNAL MEDICINE

## 2020-03-02 PROCEDURE — 3074F SYST BP LT 130 MM HG: CPT | Mod: S$GLB,,, | Performed by: INTERNAL MEDICINE

## 2020-03-02 PROCEDURE — 1126F AMNT PAIN NOTED NONE PRSNT: CPT | Mod: S$GLB,,, | Performed by: INTERNAL MEDICINE

## 2020-03-02 PROCEDURE — 1126F PR PAIN SEVERITY QUANTIFIED, NO PAIN PRESENT: ICD-10-PCS | Mod: S$GLB,,, | Performed by: INTERNAL MEDICINE

## 2020-03-02 PROCEDURE — 3078F PR MOST RECENT DIASTOLIC BLOOD PRESSURE < 80 MM HG: ICD-10-PCS | Mod: S$GLB,,, | Performed by: INTERNAL MEDICINE

## 2020-03-02 PROCEDURE — 99214 PR OFFICE/OUTPT VISIT, EST, LEVL IV, 30-39 MIN: ICD-10-PCS | Mod: S$GLB,,, | Performed by: INTERNAL MEDICINE

## 2020-03-02 PROCEDURE — 1159F MED LIST DOCD IN RCRD: CPT | Mod: S$GLB,,, | Performed by: INTERNAL MEDICINE

## 2020-03-02 PROCEDURE — 1101F PR PT FALLS ASSESS DOC 0-1 FALLS W/OUT INJ PAST YR: ICD-10-PCS | Mod: S$GLB,,, | Performed by: INTERNAL MEDICINE

## 2020-03-02 PROCEDURE — 99214 OFFICE O/P EST MOD 30 MIN: CPT | Mod: S$GLB,,, | Performed by: INTERNAL MEDICINE

## 2020-03-02 PROCEDURE — 3074F PR MOST RECENT SYSTOLIC BLOOD PRESSURE < 130 MM HG: ICD-10-PCS | Mod: S$GLB,,, | Performed by: INTERNAL MEDICINE

## 2020-03-02 PROCEDURE — 99999 PR PBB SHADOW E&M-EST. PATIENT-LVL III: ICD-10-PCS | Mod: PBBFAC,,, | Performed by: INTERNAL MEDICINE

## 2020-03-02 NOTE — PROGRESS NOTES
Subjective:    Patient ID:  Sari Biswas is a 79 y.o. female who presents for follow-up of ICM    HPI  She comes with no complaints, no chest pain, no shortness of breath  FC II    Review of Systems   Constitution: Negative for decreased appetite, malaise/fatigue, weight gain and weight loss.   Cardiovascular: Negative for chest pain, dyspnea on exertion, leg swelling, palpitations and syncope.   Respiratory: Negative for cough and shortness of breath.    Gastrointestinal: Negative.    Neurological: Negative for weakness.   All other systems reviewed and are negative.       Objective:      Physical Exam   Constitutional: She is oriented to person, place, and time. She appears well-developed and well-nourished.   HENT:   Head: Normocephalic.   Eyes: Pupils are equal, round, and reactive to light.   Neck: Normal range of motion. Neck supple. No JVD present. Carotid bruit is not present. No thyromegaly present.   Cardiovascular: Normal rate, regular rhythm, normal heart sounds, intact distal pulses and normal pulses. PMI is not displaced. Exam reveals no gallop.   No murmur heard.  Pulmonary/Chest: Effort normal and breath sounds normal.   Abdominal: Soft. Normal appearance. She exhibits no mass. There is no hepatosplenomegaly. There is no tenderness.   Musculoskeletal: Normal range of motion. She exhibits no edema.   Neurological: She is alert and oriented to person, place, and time. She has normal strength and normal reflexes. No sensory deficit.   Skin: Skin is warm and intact.   Psychiatric: She has a normal mood and affect.   Nursing note and vitals reviewed.        Assessment:       1. Coronary artery disease involving native coronary artery of native heart without angina pectoris    2. S/P CABG (coronary artery bypass graft)    3. PAF (paroxysmal atrial fibrillation)    4. Essential hypertension    5. Hyperlipidemia, unspecified hyperlipidemia type    6. ICD (implantable cardioverter-defibrillator) in place          Plan:   Stop amiodarone  Continue all other cardiac medications  Regular exercise program  Weight loss  6 m f/u with [facility]

## 2020-04-17 ENCOUNTER — TELEPHONE (OUTPATIENT)
Dept: CARDIOLOGY | Facility: CLINIC | Age: 80
End: 2020-04-17

## 2020-04-20 ENCOUNTER — TELEPHONE (OUTPATIENT)
Dept: CARDIOLOGY | Facility: CLINIC | Age: 80
End: 2020-04-20

## 2020-04-20 NOTE — TELEPHONE ENCOUNTER
Return call, scheduled ICD f/u      ----- Message from Graciela Eduardo sent at 4/17/2020  4:56 PM CDT -----  Contact: Patient  Type:  Patient Returning Call    Who Called:  Patient  Who Left Message for Patient:  Adelaida  Does the patient know what this is regarding?:  no  Best Call Back Number: [phone]   Additional Information:

## 2020-04-25 ENCOUNTER — CLINICAL SUPPORT (OUTPATIENT)
Dept: CARDIOLOGY | Facility: CLINIC | Age: 80
End: 2020-04-25
Payer: MEDICARE

## 2020-04-25 PROCEDURE — 93297 CARDIAC DEVICE CHECK - REMOTE: ICD-10-PCS | Mod: S$GLB,,, | Performed by: INTERNAL MEDICINE

## 2020-04-25 PROCEDURE — 93297 REM INTERROG DEV EVAL ICPMS: CPT | Mod: S$GLB,,, | Performed by: INTERNAL MEDICINE

## 2020-05-05 ENCOUNTER — PATIENT MESSAGE (OUTPATIENT)
Dept: ADMINISTRATIVE | Facility: HOSPITAL | Age: 80
End: 2020-05-05

## 2020-05-08 DIAGNOSIS — I10 ESSENTIAL HYPERTENSION: ICD-10-CM

## 2020-05-08 RX ORDER — POTASSIUM CHLORIDE 750 MG/1
CAPSULE, EXTENDED RELEASE ORAL
Qty: 48 CAPSULE | Refills: 0 | Status: SHIPPED | OUTPATIENT
Start: 2020-05-08 | End: 2020-05-28 | Stop reason: SDUPTHER

## 2020-05-09 NOTE — PROGRESS NOTES
Please schedule patient for Labs (CMP & LIPID)    Please also check with your provider if any further labs need to be added and scheduled together.    Thanks !

## 2020-05-09 NOTE — PROGRESS NOTES
Refill Authorization Note     is requesting a refill authorization.    Brief assessment and rationale for refill: APPROVE: needs labs     Medication-related problems identified: Requires labs    Medication Therapy Plan: NTBS(CMP/LIPID); FOVS    Medication reconciliation completed: No                         Comments:   Automatic Epic Protocol Generated Data:    Requested Prescriptions   Signed Prescriptions Disp Refills    potassium chloride (MICRO-K) 10 MEQ CpSR 48 capsule 0     Sig: TAKE 1 CAPSULE DAILY ON SATURDAY, SUNDAY, TUESDAY AND THURSDAY       Endocrinology:  Minerals - Potassium Supplementation Failed - 5/8/2020 11:38 AM        Failed - K in normal range and within 180 days     POC Potassium   Date Value Ref Range Status   05/01/2019 4.2 3.6 - 5.1 mmol/L Final   07/11/2018 2.9 (L) 3.5 - 5.1 mmol/L Final   07/11/2018 3.2 (L) 3.5 - 5.1 mmol/L Final     Potassium   Date Value Ref Range Status   06/07/2019 4.2 3.5 - 5.1 mmol/L Final   04/18/2019 5.0 3.5 - 5.1 mmol/L Final   12/14/2018 4.9 3.5 - 5.1 mmol/L Final              Failed - Cr is 1.3 or below and within 180 days     Creatinine   Date Value Ref Range Status   06/07/2019 1.0 0.5 - 1.4 mg/dL Final   04/18/2019 1.0 0.5 - 1.4 mg/dL Final   12/14/2018 0.9 0.5 - 1.4 mg/dL Final     POC Creatinine   Date Value Ref Range Status   05/01/2019 1.0 0.6 - 1.2 mg/dL Final              Failed - eGFR within 180 days     POC eGFR   Date Value Ref Range Status   05/01/2019 55 (L) 61 - 2,000 mL/min Final     eGFR if non    Date Value Ref Range Status   06/07/2019 53 (A) >60 mL/min/1.73 m^2 Final     Comment:     Calculation used to obtain the estimated glomerular filtration  rate (eGFR) is the CKD-EPI equation.      04/18/2019 53.7 (A) >60 mL/min/1.73 m^2 Final     Comment:     Calculation used to obtain the estimated glomerular filtration  rate (eGFR) is the CKD-EPI equation.      12/14/2018 >60.0 >60 mL/min/1.73 m^2 Final     Comment:      Calculation used to obtain the estimated glomerular filtration  rate (eGFR) is the CKD-EPI equation.        eGFR if    Date Value Ref Range Status   06/07/2019 >60 >60 mL/min/1.73 m^2 Final   04/18/2019 >60.0 >60 mL/min/1.73 m^2 Final   12/14/2018 >60.0 >60 mL/min/1.73 m^2 Final              Passed - Patient is at least 18 years old        Passed - Office visit in past 12 months or future 90 days.     Recent Outpatient Visits            2 months ago Coronary artery disease involving native coronary artery of native heart without angina pectoris    [facility] Cardiology Rodo Singleton MD    4 months ago Routine gynecological examination    [facility]    5 months ago Chronic atrial fibrillation    [facility] Medicine Turner Anne MD    8 months ago PAF (paroxysmal atrial fibrillation)    [facility] Cardiology Rodo Singleton MD    11 months ago Routine medical exam    [facility] Turner Anne MD          Future Appointments              In 2 weeks HOME MONITOR DEVICE CHECK, [facility] [place] - Cardiology, [place]    In 2 weeks HOME MONITOR DEVICE CHECK, [facility] [place] - Cardiology, [place]    In 2 weeks Turner Anne MD [facility], [place]    In 2 weeks PACEMAKER, [place] - Cardiology, [place]    In 3 months ECHO, [facility] - Cardiology, [place]    In 4 months Rodo Singleton [place] [facility]                   Appointments  past 12m or future 3m with PCP    Date Provider   Last Visit   11/26/2019 Turner Anne MD   Next Visit   5/26/2020 Turner Anne MD   ED visits in past 90 days: 0     Note composed:8:45 PM 05/08/2020

## 2020-05-18 ENCOUNTER — TELEPHONE (OUTPATIENT)
Dept: CARDIOLOGY | Facility: CLINIC | Age: 80
End: 2020-05-18

## 2020-05-25 ENCOUNTER — CLINICAL SUPPORT (OUTPATIENT)
Dept: CARDIOLOGY | Facility: CLINIC | Age: 80
End: 2020-05-25
Payer: MEDICARE

## 2020-05-25 DIAGNOSIS — Z95.810 PRESENCE OF AUTOMATIC (IMPLANTABLE) CARDIAC DEFIBRILLATOR: ICD-10-CM

## 2020-05-25 PROCEDURE — 93297 REM INTERROG DEV EVAL ICPMS: CPT | Mod: S$GLB,,, | Performed by: INTERNAL MEDICINE

## 2020-05-25 PROCEDURE — 93297 CARDIAC DEVICE CHECK - REMOTE: ICD-10-PCS | Mod: S$GLB,,, | Performed by: INTERNAL MEDICINE

## 2020-05-26 ENCOUNTER — OFFICE VISIT (OUTPATIENT)
Dept: FAMILY MEDICINE | Facility: CLINIC | Age: 80
End: 2020-05-26
Payer: MEDICARE

## 2020-05-26 ENCOUNTER — PATIENT MESSAGE (OUTPATIENT)
Dept: FAMILY MEDICINE | Facility: CLINIC | Age: 80
End: 2020-05-26

## 2020-05-26 VITALS
BODY MASS INDEX: 26.83 KG/M2 | SYSTOLIC BLOOD PRESSURE: 122 MMHG | DIASTOLIC BLOOD PRESSURE: 72 MMHG | WEIGHT: 161.19 LBS | HEART RATE: 77 BPM

## 2020-05-26 DIAGNOSIS — M81.0 OSTEOPOROSIS, UNSPECIFIED OSTEOPOROSIS TYPE, UNSPECIFIED PATHOLOGICAL FRACTURE PRESENCE: ICD-10-CM

## 2020-05-26 DIAGNOSIS — I48.20 CHRONIC ATRIAL FIBRILLATION: Primary | ICD-10-CM

## 2020-05-26 DIAGNOSIS — I10 ESSENTIAL HYPERTENSION: ICD-10-CM

## 2020-05-26 DIAGNOSIS — E55.9 VITAMIN D DEFICIENCY: ICD-10-CM

## 2020-05-26 PROCEDURE — 1101F PT FALLS ASSESS-DOCD LE1/YR: CPT | Mod: ,,, | Performed by: FAMILY MEDICINE

## 2020-05-26 PROCEDURE — 99499 UNLISTED E&M SERVICE: CPT | Mod: 95,,, | Performed by: FAMILY MEDICINE

## 2020-05-26 PROCEDURE — 3074F PR MOST RECENT SYSTOLIC BLOOD PRESSURE < 130 MM HG: ICD-10-PCS | Mod: ,,, | Performed by: FAMILY MEDICINE

## 2020-05-26 PROCEDURE — 1159F MED LIST DOCD IN RCRD: CPT | Mod: ,,, | Performed by: FAMILY MEDICINE

## 2020-05-26 PROCEDURE — 1159F PR MEDICATION LIST DOCUMENTED IN MEDICAL RECORD: ICD-10-PCS | Mod: ,,, | Performed by: FAMILY MEDICINE

## 2020-05-26 PROCEDURE — 3074F SYST BP LT 130 MM HG: CPT | Mod: ,,, | Performed by: FAMILY MEDICINE

## 2020-05-26 PROCEDURE — 3078F DIAST BP <80 MM HG: CPT | Mod: ,,, | Performed by: FAMILY MEDICINE

## 2020-05-26 PROCEDURE — 99499 RISK ADDL DX/OHS AUDIT: ICD-10-PCS | Mod: 95,,, | Performed by: FAMILY MEDICINE

## 2020-05-26 PROCEDURE — 1101F PR PT FALLS ASSESS DOC 0-1 FALLS W/OUT INJ PAST YR: ICD-10-PCS | Mod: ,,, | Performed by: FAMILY MEDICINE

## 2020-05-26 PROCEDURE — 99214 OFFICE O/P EST MOD 30 MIN: CPT | Mod: 95,,, | Performed by: FAMILY MEDICINE

## 2020-05-26 PROCEDURE — 99214 PR OFFICE/OUTPT VISIT, EST, LEVL IV, 30-39 MIN: ICD-10-PCS | Mod: 95,,, | Performed by: FAMILY MEDICINE

## 2020-05-26 PROCEDURE — 3078F PR MOST RECENT DIASTOLIC BLOOD PRESSURE < 80 MM HG: ICD-10-PCS | Mod: ,,, | Performed by: FAMILY MEDICINE

## 2020-05-26 NOTE — PROGRESS NOTES
Subjective:       Patient ID: Sari Biswas is a 80 y.o. female.    Chief Complaint: No chief complaint on file.    HPI     The patient location is: home  The chief complaint leading to consultation is: htn    Visit type: audiovisual    Face to Face time with patient:    25 minutes of total time spent on the encounter, which includes face to face time and non-face to face time preparing to see the patient (eg, review of tests), Obtaining and/or reviewing separately obtained history, Documenting clinical information in the electronic or other health record, Independently interpreting results (not separately reported) and communicating results to the patient/family/caregiver, or Care coordination (not separately reported).         Each patient to whom he or she provides medical services by telemedicine is:  (1) informed of the relationship between the physician and patient and the respective role of any other health care provider with respect to management of the patient; and (2) notified that he or she may decline to receive medical services by telemedicine and may withdraw from such care at any time.    Notes:       Here for a f/u.      paf stable.      hld stable.     Taking fosamax for osteoporosis. Started 1/2016. Will take until 1/2021.    On vit d2 for vitamin d deficiency    Review of Systems      Review of Systems   Constitutional: Negative for fever and chills.   HENT: Negative for hearing loss and neck stiffness.    Eyes: Negative for redness and itching.   Respiratory: Negative for cough and choking.    Cardiovascular: Negative for chest pain and leg swelling.  Abdomen: Negative for abdominal pain and blood in stool.   Genitourinary: Negative for dysuria and flank pain.   Musculoskeletal: Negative for back pain and gait problem.   Neurological: Negative for light-headedness and headaches.   Hematological: Negative for adenopathy.       Objective:      Physical Exam   Constitutional: She appears  well-developed.   HENT:   Head: Normocephalic and atraumatic.   Eyes: Pupils are equal, round, and reactive to light. Conjunctivae are normal.   Neck: Normal range of motion.   Cardiovascular: Normal rate and regular rhythm.   No murmur heard.  Pulmonary/Chest: Effort normal and breath sounds normal.   Lymphadenopathy:     She has no cervical adenopathy.       Assessment:       1. Chronic atrial fibrillation    2. Essential hypertension    3. Osteoporosis, unspecified osteoporosis type, unspecified pathological fracture presence    4. Vitamin D deficiency        Plan:       Chronic atrial fibrillation    Essential hypertension    Osteoporosis, unspecified osteoporosis type, unspecified pathological fracture presence    Vitamin D deficiency                Plan:  Cont current meds        Medication List with Changes/Refills   Current Medications    ACETAMINOPHEN (TYLENOL) 325 MG TABLET    Take 2 tablets (650 mg total) by mouth every 6 (six) hours as needed.    ALENDRONATE (FOSAMAX) 70 MG TABLET    Take 1 tablet (70 mg total) by mouth every 7 days.    BIOTIN ORAL    Take 1 capsule by mouth once daily.     CLOBETASOL (TEMOVATE) 0.05 % CREAM    Apply topically 2 (two) times daily.    CLOPIDOGREL (PLAVIX) 75 MG TABLET    TAKE 1 TABLET EVERY OTHER DAY    CRANBERRY 400 MG CAP    Take 1 capsule by mouth once daily.     FUROSEMIDE (LASIX) 20 MG TABLET    Take 1 tablet (20 mg total) by mouth once daily.    METOPROLOL SUCCINATE (TOPROL-XL) 25 MG 24 HR TABLET    Take 1 tablet (25 mg total) by mouth 2 (two) times daily.    POTASSIUM CHLORIDE (MICRO-K) 10 MEQ CPSR    TAKE 1 CAPSULE DAILY ON SATURDAY, SUNDAY, TUESDAY AND THURSDAY    ROSUVASTATIN (CRESTOR) 5 MG TABLET    Take 1 tablet (5 mg total) by mouth every evening.    SACUBITRIL-VALSARTAN (ENTRESTO) 24-26 MG PER TABLET    Take 1 tablet by mouth 2 (two) times daily.    SPIRONOLACTONE (ALDACTONE) 25 MG TABLET    Take 0.5 tablets (12.5 mg total) by mouth once daily.    VIT  C,S-JJ-EVOJJ-LUTEIN-ZEAXAN (PRESERVISION AREDS 2) 063-251-17-1 MG-UNIT-MG-MG CAP    Take by mouth 2 (two) times daily.    VITAMIN D2 50,000 UNIT CAPSULE    TAKE 1 CAPSULE BY MOUTH THREE TIMES A WEEK  [facility]

## 2020-05-27 ENCOUNTER — CLINICAL SUPPORT (OUTPATIENT)
Dept: CARDIOLOGY | Facility: CLINIC | Age: 80
End: 2020-05-27
Attending: INTERNAL MEDICINE
Payer: MEDICARE

## 2020-05-27 DIAGNOSIS — Z95.810 CARDIAC DEFIBRILLATOR IN SITU: ICD-10-CM

## 2020-05-27 DIAGNOSIS — I42.0 ISCHEMIC DILATED CARDIOMYOPATHY: ICD-10-CM

## 2020-05-27 DIAGNOSIS — I25.5 ISCHEMIC DILATED CARDIOMYOPATHY: ICD-10-CM

## 2020-05-27 NOTE — PROGRESS NOTES
Patient, Sari Biswas (MRN #[id number]), presented with a recent Ejection Fraction less than 45% consistent with the definition of cardiomyopathy (ICD10- I42.8).    No results found for: EF  The patient's cardiomyopathy was monitored, evaluated, addressed and/or treated. This addendum to the medical record is made on 05/27/2020.

## 2020-05-28 DIAGNOSIS — I10 ESSENTIAL HYPERTENSION: ICD-10-CM

## 2020-05-28 RX ORDER — POTASSIUM CHLORIDE 750 MG/1
CAPSULE, EXTENDED RELEASE ORAL
Qty: 48 CAPSULE | Refills: 5 | Status: SHIPPED | OUTPATIENT
Start: 2020-05-28 | End: 2021-06-10

## 2020-05-28 NOTE — TELEPHONE ENCOUNTER
Please see request for refill     This patient has not had a a BMP since June 2019.    There is an order in the system from January that has not been drawn yet.     Please advise if lab is necessary before the refill is provided.

## 2020-06-09 NOTE — PROGRESS NOTES
Refill Routing Note    Medication(s) are not appropriate for processing by [facility]:       Outside of protocol           Medication reconciliation completed: No      Automatic Epic Protocol Generated Data:    Requested Prescriptions   Pending Prescriptions Disp Refills    alendronate (FOSAMAX) 70 MG tablet [Pharmacy Med Name: ALENDRONATE SOD TABS 4'S 70MG] 12 tablet 3     Sig: TAKE 1 TABLET EVERY 7 DAYS       Endocrinology: Bisphosphonates - alendronate Failed - 6/8/2020 10:07 AM        Failed - Cr is 1.3 or below and within 360 days     Creatinine   Date Value Ref Range Status   06/07/2019 1.0 0.5 - 1.4 mg/dL Final   04/18/2019 1.0 0.5 - 1.4 mg/dL Final   12/14/2018 0.9 0.5 - 1.4 mg/dL Final     POC Creatinine   Date Value Ref Range Status   05/01/2019 1.0 0.6 - 1.2 mg/dL Final              Failed - Ca in normal range and within 360 days     Calcium   Date Value Ref Range Status   06/07/2019 9.3 8.7 - 10.5 mg/dL Final   04/18/2019 9.1 8.7 - 10.5 mg/dL Final   12/14/2018 9.4 8.7 - 10.5 mg/dL Final     Calcium, POC   Date Value Ref Range Status   05/01/2019 9.2 8.0 - 10.3 mg/dL Final              Failed - eGFR is 35 or above and within 360 days     POC eGFR   Date Value Ref Range Status   05/01/2019 55 (L) 61 - 2,000 mL/min Final     eGFR if non    Date Value Ref Range Status   06/07/2019 53 (A) >60 mL/min/1.73 m^2 Final     Comment:     Calculation used to obtain the estimated glomerular filtration  rate (eGFR) is the CKD-EPI equation.      04/18/2019 53.7 (A) >60 mL/min/1.73 m^2 Final     Comment:     Calculation used to obtain the estimated glomerular filtration  rate (eGFR) is the CKD-EPI equation.      12/14/2018 >60.0 >60 mL/min/1.73 m^2 Final     Comment:     Calculation used to obtain the estimated glomerular filtration  rate (eGFR) is the CKD-EPI equation.        eGFR if    Date Value Ref Range Status   06/07/2019 >60 >60 mL/min/1.73 m^2 Final   04/18/2019 >60.0  >60 mL/min/1.73 m^2 Final   12/14/2018 >60.0 >60 mL/min/1.73 m^2 Final              Failed - Vitamin D in normal range and within 360 days     Vit D, 25-Hydroxy   Date Value Ref Range Status   04/18/2019 42 30 - 96 ng/mL Final     Comment:     Vitamin D deficiency.........<10 ng/mL                              Vitamin D insufficiency......10-29 ng/mL       Vitamin D sufficiency........> or equal to 30 ng/mL  Vitamin D toxicity............>100 ng/mL                Passed - Patient is at least 18 years old        Passed - Office visit in past 12 months or future 90 days.     Recent Outpatient Visits            1 week ago Chronic atrial fibrillation    [facility] Turner Anne MD    3 months ago Coronary artery disease involving native coronary artery of native heart without angina pectoris    [facility] Rodo Singleton MD    6 months ago Routine gynecological examination    [facility] Luci Costa MD    6 months ago Chronic atrial fibrillation    [facility] Turner Anne MD    9 months ago PAF (paroxysmal atrial fibrillation)    [facility] Rodo Singleton MD          Future Appointments              In 2 weeks HOME MONITOR DEVICE CHECK, [facility] [place]    In 2 months HOME MONITOR DEVICE CHECK, [facility]    [facility]  [place]    In 3 months Rodo Singleton MD [facility]                Passed - DEXA completed in last 720 days     Results for orders placed during the hospital encounter of 06/04/19   DXA [facility] 6/4/2019                  Appointments  past 12m or future 3m with PCP    Date Provider   Last Visit   5/26/2020 Turner Anne MD   Next Visit   Visit date not found Turner Anne MD   ED visits in past 90 days: 0     Note composed:7:11 PM 06/08/2020

## 2020-06-10 RX ORDER — ALENDRONATE SODIUM 70 MG/1
TABLET ORAL
Qty: 12 TABLET | Refills: 3 | Status: SHIPPED | OUTPATIENT
Start: 2020-06-10 | End: 2021-03-04

## 2020-07-24 ENCOUNTER — CLINICAL SUPPORT (OUTPATIENT)
Dept: CARDIOLOGY | Facility: CLINIC | Age: 80
End: 2020-07-24
Payer: MEDICARE

## 2020-07-24 DIAGNOSIS — Z95.810 PRESENCE OF AUTOMATIC (IMPLANTABLE) CARDIAC DEFIBRILLATOR: ICD-10-CM

## 2020-07-24 PROCEDURE — 93297 CARDIAC DEVICE CHECK - REMOTE: ICD-10-PCS | Mod: S$GLB,,, | Performed by: INTERNAL MEDICINE

## 2020-07-24 PROCEDURE — 93297 REM INTERROG DEV EVAL ICPMS: CPT | Mod: S$GLB,,, | Performed by: INTERNAL MEDICINE

## 2020-08-17 ENCOUNTER — TELEPHONE (OUTPATIENT)
Dept: CARDIOLOGY | Facility: CLINIC | Age: 80
End: 2020-08-17

## 2020-08-17 DIAGNOSIS — Z95.810 CARDIAC DEFIBRILLATOR IN SITU: Primary | ICD-10-CM

## 2020-08-17 DIAGNOSIS — I10 ESSENTIAL HYPERTENSION: ICD-10-CM

## 2020-08-17 NOTE — TELEPHONE ENCOUNTER
----- Message from Tom Anderson sent at 8/17/2020  9:46 AM CDT -----  Regarding: Labs prior to appt?  Contact: patient  Patient called in and stated she has an Echo scheduled on 9/3/20 prior to her appointment with Dr. Mina on 9/9/20.  Patient wanted to see if she had to get labs done also?    Patient call back number is [phone]

## 2020-08-23 ENCOUNTER — CLINICAL SUPPORT (OUTPATIENT)
Dept: CARDIOLOGY | Facility: CLINIC | Age: 80
End: 2020-08-23
Payer: MEDICARE

## 2020-08-23 DIAGNOSIS — Z95.810 PRESENCE OF AUTOMATIC (IMPLANTABLE) CARDIAC DEFIBRILLATOR: ICD-10-CM

## 2020-08-23 PROCEDURE — 93297 REM INTERROG DEV EVAL ICPMS: CPT | Mod: S$GLB,,, | Performed by: INTERNAL MEDICINE

## 2020-08-23 PROCEDURE — 93297 CARDIAC DEVICE CHECK - REMOTE: ICD-10-PCS | Mod: S$GLB,,, | Performed by: INTERNAL MEDICINE

## 2020-09-03 ENCOUNTER — CLINICAL SUPPORT (OUTPATIENT)
Dept: CARDIOLOGY | Facility: CLINIC | Age: 80
End: 2020-09-03
Attending: INTERNAL MEDICINE
Payer: MEDICARE

## 2020-09-03 VITALS — BODY MASS INDEX: 26.82 KG/M2 | WEIGHT: 161 LBS | HEIGHT: 65 IN

## 2020-09-03 LAB
ASCENDING AORTA: 3.21 CM
AV INDEX (PROSTH): 0.63
AV MEAN GRADIENT: 5 MMHG
AV PEAK GRADIENT: 10 MMHG
AV VALVE AREA: 1.8 CM2
AV VELOCITY RATIO: 0.59
BSA FOR ECHO PROCEDURE: 1.83 M2
CV ECHO LV RWT: 0.37 CM
DOP CALC AO PEAK VEL: 1.58 M/S
DOP CALC AO VTI: 35.05 CM
DOP CALC LVOT AREA: 2.8 CM2
DOP CALC LVOT DIAMETER: 1.9 CM
DOP CALC LVOT PEAK VEL: 0.94 M/S
DOP CALC LVOT STROKE VOLUME: 63.02 CM3
DOP CALCLVOT PEAK VEL VTI: 22.24 CM
E WAVE DECELERATION TIME: 191.27 MSEC
E/A RATIO: 0.6
E/E' RATIO: 9.69 M/S
ECHO LV POSTERIOR WALL: 1.01 CM (ref 0.6–1.1)
FRACTIONAL SHORTENING: 19 % (ref 28–44)
INTERVENTRICULAR SEPTUM: 0.86 CM (ref 0.6–1.1)
IVRT: 108.47 MSEC
LA MAJOR: 5.09 CM
LA MINOR: 5.28 CM
LA WIDTH: 4.33 CM
LEFT ATRIUM SIZE: 4.35 CM
LEFT ATRIUM VOLUME INDEX: 46 ML/M2
LEFT ATRIUM VOLUME: 82.98 CM3
LEFT INTERNAL DIMENSION IN SYSTOLE: 4.41 CM (ref 2.1–4)
LEFT VENTRICLE DIASTOLIC VOLUME INDEX: 79.73 ML/M2
LEFT VENTRICLE DIASTOLIC VOLUME: 143.83 ML
LEFT VENTRICLE MASS INDEX: 106 G/M2
LEFT VENTRICLE SYSTOLIC VOLUME INDEX: 48.9 ML/M2
LEFT VENTRICLE SYSTOLIC VOLUME: 88.16 ML
LEFT VENTRICULAR INTERNAL DIMENSION IN DIASTOLE: 5.44 CM (ref 3.5–6)
LEFT VENTRICULAR MASS: 191.65 G
LV LATERAL E/E' RATIO: 9 M/S
LV SEPTAL E/E' RATIO: 10.5 M/S
MV PEAK A VEL: 1.05 M/S
MV PEAK E VEL: 0.63 M/S
PISA MRMAX VEL: 0.04 M/S
PISA TR MAX VEL: 2.73 M/S
PULM VEIN S/D RATIO: 1.84
PV PEAK D VEL: 0.38 M/S
PV PEAK S VEL: 0.7 M/S
RA MAJOR: 4.69 CM
RA WIDTH: 3.94 CM
RIGHT VENTRICULAR END-DIASTOLIC DIMENSION: 4.43 CM
SINUS: 3.18 CM
STJ: 2.49 CM
TDI LATERAL: 0.07 M/S
TDI SEPTAL: 0.06 M/S
TDI: 0.07 M/S
TR MAX PG: 30 MMHG
TRICUSPID ANNULAR PLANE SYSTOLIC EXCURSION: 1.19 CM

## 2020-09-03 PROCEDURE — 99999 PR PBB SHADOW E&M-EST. PATIENT-LVL I: CPT | Mod: PBBFAC,,,

## 2020-09-03 PROCEDURE — 99999 PR PBB SHADOW E&M-EST. PATIENT-LVL I: ICD-10-PCS | Mod: PBBFAC,,,

## 2020-09-06 ENCOUNTER — PATIENT OUTREACH (OUTPATIENT)
Dept: ADMINISTRATIVE | Facility: OTHER | Age: 80
End: 2020-09-06

## 2020-09-06 NOTE — PROGRESS NOTES
LINKS immunization registry updated  Care Everywhere updated  Health Maintenance updated  Chart reviewed for overdue [facility]active [facility] ([facility]) health maintenance testing (CRS, Breast Ca, Diabetic Eye Exam)   Orders entered:N/A

## 2020-09-09 ENCOUNTER — OFFICE VISIT (OUTPATIENT)
Dept: CARDIOLOGY | Facility: CLINIC | Age: 80
End: 2020-09-09
Attending: INTERNAL MEDICINE
Payer: MEDICARE

## 2020-09-09 VITALS
SYSTOLIC BLOOD PRESSURE: 103 MMHG | DIASTOLIC BLOOD PRESSURE: 56 MMHG | HEIGHT: 64 IN | HEART RATE: 65 BPM | BODY MASS INDEX: 28.42 KG/M2 | WEIGHT: 166.44 LBS

## 2020-09-09 DIAGNOSIS — I25.5 ISCHEMIC DILATED CARDIOMYOPATHY: ICD-10-CM

## 2020-09-09 DIAGNOSIS — I47.20 VENTRICULAR TACHYCARDIA: ICD-10-CM

## 2020-09-09 DIAGNOSIS — I48.0 PAROXYSMAL ATRIAL FIBRILLATION: ICD-10-CM

## 2020-09-09 DIAGNOSIS — Z95.1 S/P CABG (CORONARY ARTERY BYPASS GRAFT): ICD-10-CM

## 2020-09-09 DIAGNOSIS — Z95.810 ICD (IMPLANTABLE CARDIOVERTER-DEFIBRILLATOR) IN PLACE: ICD-10-CM

## 2020-09-09 DIAGNOSIS — I42.0 ISCHEMIC DILATED CARDIOMYOPATHY: ICD-10-CM

## 2020-09-09 DIAGNOSIS — I25.10 CORONARY ARTERY DISEASE INVOLVING NATIVE CORONARY ARTERY OF NATIVE HEART WITHOUT ANGINA PECTORIS: Primary | ICD-10-CM

## 2020-09-09 PROCEDURE — 3078F DIAST BP <80 MM HG: CPT | Mod: S$GLB,,, | Performed by: INTERNAL MEDICINE

## 2020-09-09 PROCEDURE — 99214 OFFICE O/P EST MOD 30 MIN: CPT | Mod: S$GLB,,, | Performed by: INTERNAL MEDICINE

## 2020-09-09 PROCEDURE — 1101F PT FALLS ASSESS-DOCD LE1/YR: CPT | Mod: S$GLB,,, | Performed by: INTERNAL MEDICINE

## 2020-09-09 PROCEDURE — 99214 PR OFFICE/OUTPT VISIT, EST, LEVL IV, 30-39 MIN: ICD-10-PCS | Mod: S$GLB,,, | Performed by: INTERNAL MEDICINE

## 2020-09-09 PROCEDURE — 3074F SYST BP LT 130 MM HG: CPT | Mod: S$GLB,,, | Performed by: INTERNAL MEDICINE

## 2020-09-09 PROCEDURE — 99999 PR PBB SHADOW E&M-EST. PATIENT-LVL III: CPT | Mod: PBBFAC,,, | Performed by: INTERNAL MEDICINE

## 2020-09-09 PROCEDURE — 1159F MED LIST DOCD IN RCRD: CPT | Mod: S$GLB,,, | Performed by: INTERNAL MEDICINE

## 2020-09-09 PROCEDURE — 1126F PR PAIN SEVERITY QUANTIFIED, NO PAIN PRESENT: ICD-10-PCS | Mod: S$GLB,,, | Performed by: INTERNAL MEDICINE

## 2020-09-09 PROCEDURE — 1159F PR MEDICATION LIST DOCUMENTED IN MEDICAL RECORD: ICD-10-PCS | Mod: S$GLB,,, | Performed by: INTERNAL MEDICINE

## 2020-09-09 PROCEDURE — 3074F PR MOST RECENT SYSTOLIC BLOOD PRESSURE < 130 MM HG: ICD-10-PCS | Mod: S$GLB,,, | Performed by: INTERNAL MEDICINE

## 2020-09-09 PROCEDURE — 99999 PR PBB SHADOW E&M-EST. PATIENT-LVL III: ICD-10-PCS | Mod: PBBFAC,,, | Performed by: INTERNAL MEDICINE

## 2020-09-09 PROCEDURE — 1126F AMNT PAIN NOTED NONE PRSNT: CPT | Mod: S$GLB,,, | Performed by: INTERNAL MEDICINE

## 2020-09-09 PROCEDURE — 3078F PR MOST RECENT DIASTOLIC BLOOD PRESSURE < 80 MM HG: ICD-10-PCS | Mod: S$GLB,,, | Performed by: INTERNAL MEDICINE

## 2020-09-09 PROCEDURE — 1101F PR PT FALLS ASSESS DOC 0-1 FALLS W/OUT INJ PAST YR: ICD-10-PCS | Mod: S$GLB,,, | Performed by: INTERNAL MEDICINE

## 2020-09-09 NOTE — PROGRESS NOTES
Subjective:    Patient ID:  Sari Biswas is a 80 y.o. female who presents for follow-up of ICM    HPI  She comes with no complaints, no chest pain, no shortness of breath  FC II    Review of Systems   Constitution: Negative for decreased appetite, malaise/fatigue, weight gain and weight loss.   Cardiovascular: Negative for chest pain, dyspnea on exertion, leg swelling, palpitations and syncope.   Respiratory: Negative for cough and shortness of breath.    Gastrointestinal: Negative.    Neurological: Negative for weakness.   All other systems reviewed and are negative.       Objective:      Physical Exam   Constitutional: She is oriented to person, place, and time. She appears well-developed and well-nourished.   HENT:   Head: Normocephalic.   Eyes: Pupils are equal, round, and reactive to light.   Neck: Normal range of motion. Neck supple. No JVD present. Carotid bruit is not present. No thyromegaly present.   Cardiovascular: Normal rate, regular rhythm, normal heart sounds, intact distal pulses and normal pulses. PMI is not displaced. Exam reveals no gallop.   No murmur heard.  Pulmonary/Chest: Effort normal and breath sounds normal.   Abdominal: Soft. Normal appearance. She exhibits no mass. There is no hepatosplenomegaly. There is no abdominal tenderness.   Musculoskeletal: Normal range of motion.         General: No edema.   Neurological: She is alert and oriented to person, place, and time. She has normal strength and normal reflexes. No sensory deficit.   Skin: Skin is warm and intact.   Psychiatric: She has a normal mood and affect.   Nursing note and vitals reviewed.    Echo reviewed      Assessment:       1. Coronary artery disease involving native coronary artery of native heart without angina pectoris    2. S/P CABG (coronary artery bypass graft)    3. ICD (implantable cardioverter-defibrillator) in place    4. Ischemic dilated cardiomyopathy    5. Ventricular tachycardia    6. Paroxysmal atrial  fibrillation         Plan:   Labs ordered; call with results  Continue all cardiac medications  Regular exercise program  Weight loss  9 m f/u

## 2020-09-10 ENCOUNTER — LAB VISIT (OUTPATIENT)
Dept: LAB | Facility: HOSPITAL | Age: 80
End: 2020-09-10
Attending: FAMILY MEDICINE
Payer: MEDICARE

## 2020-09-10 ENCOUNTER — IMMUNIZATION (OUTPATIENT)
Dept: PHARMACY | Facility: CLINIC | Age: 80
End: 2020-09-10
Payer: MEDICARE

## 2020-09-10 DIAGNOSIS — I10 ESSENTIAL HYPERTENSION: ICD-10-CM

## 2020-09-10 DIAGNOSIS — Z95.810 CARDIAC DEFIBRILLATOR IN SITU: ICD-10-CM

## 2020-09-10 LAB
ALBUMIN SERPL BCP-MCNC: 3.9 G/DL (ref 3.5–5.2)
ALBUMIN SERPL BCP-MCNC: 3.9 G/DL (ref 3.5–5.2)
ALP SERPL-CCNC: 82 U/L (ref 55–135)
ALP SERPL-CCNC: 82 U/L (ref 55–135)
ALT SERPL W/O P-5'-P-CCNC: 13 U/L (ref 10–44)
ALT SERPL W/O P-5'-P-CCNC: 13 U/L (ref 10–44)
ANION GAP SERPL CALC-SCNC: 7 MMOL/L (ref 8–16)
ANION GAP SERPL CALC-SCNC: 7 MMOL/L (ref 8–16)
AST SERPL-CCNC: 19 U/L (ref 10–40)
AST SERPL-CCNC: 19 U/L (ref 10–40)
BILIRUB DIRECT SERPL-MCNC: 0.3 MG/DL (ref 0.1–0.3)
BILIRUB SERPL-MCNC: 0.6 MG/DL (ref 0.1–1)
BILIRUB SERPL-MCNC: 0.6 MG/DL (ref 0.1–1)
BNP SERPL-MCNC: 242 PG/ML (ref 0–99)
BUN SERPL-MCNC: 30 MG/DL (ref 8–23)
BUN SERPL-MCNC: 30 MG/DL (ref 8–23)
CALCIUM SERPL-MCNC: 8.7 MG/DL (ref 8.7–10.5)
CALCIUM SERPL-MCNC: 8.7 MG/DL (ref 8.7–10.5)
CHLORIDE SERPL-SCNC: 108 MMOL/L (ref 95–110)
CHLORIDE SERPL-SCNC: 108 MMOL/L (ref 95–110)
CHOLEST SERPL-MCNC: 134 MG/DL (ref 120–199)
CHOLEST/HDLC SERPL: 2.5 {RATIO} (ref 2–5)
CO2 SERPL-SCNC: 26 MMOL/L (ref 23–29)
CO2 SERPL-SCNC: 26 MMOL/L (ref 23–29)
CREAT SERPL-MCNC: 1.1 MG/DL (ref 0.5–1.4)
CREAT SERPL-MCNC: 1.1 MG/DL (ref 0.5–1.4)
EST. GFR  (AFRICAN AMERICAN): 54.4 ML/MIN/1.73 M^2
EST. GFR  (AFRICAN AMERICAN): 54.4 ML/MIN/1.73 M^2
EST. GFR  (NON AFRICAN AMERICAN): 47.2 ML/MIN/1.73 M^2
EST. GFR  (NON AFRICAN AMERICAN): 47.2 ML/MIN/1.73 M^2
GLUCOSE SERPL-MCNC: 102 MG/DL (ref 70–110)
GLUCOSE SERPL-MCNC: 102 MG/DL (ref 70–110)
HDLC SERPL-MCNC: 53 MG/DL (ref 40–75)
HDLC SERPL: 39.6 % (ref 20–50)
LDLC SERPL CALC-MCNC: 67.6 MG/DL (ref 63–159)
NONHDLC SERPL-MCNC: 81 MG/DL
POTASSIUM SERPL-SCNC: 4.5 MMOL/L (ref 3.5–5.1)
POTASSIUM SERPL-SCNC: 4.5 MMOL/L (ref 3.5–5.1)
PROT SERPL-MCNC: 6.9 G/DL (ref 6–8.4)
PROT SERPL-MCNC: 6.9 G/DL (ref 6–8.4)
SODIUM SERPL-SCNC: 141 MMOL/L (ref 136–145)
SODIUM SERPL-SCNC: 141 MMOL/L (ref 136–145)
TRIGL SERPL-MCNC: 67 MG/DL (ref 30–150)

## 2020-09-10 PROCEDURE — 80076 HEPATIC FUNCTION PANEL: CPT

## 2020-09-10 PROCEDURE — 36415 COLL VENOUS BLD VENIPUNCTURE: CPT | Mod: PO

## 2020-09-10 PROCEDURE — 83880 ASSAY OF NATRIURETIC PEPTIDE: CPT

## 2020-09-10 PROCEDURE — 80061 LIPID PANEL: CPT

## 2020-09-10 PROCEDURE — 80053 COMPREHEN METABOLIC PANEL: CPT

## 2020-09-22 ENCOUNTER — CLINICAL SUPPORT (OUTPATIENT)
Dept: CARDIOLOGY | Facility: CLINIC | Age: 80
End: 2020-09-22
Payer: MEDICARE

## 2020-09-24 NOTE — TELEPHONE ENCOUNTER
----- Message from Cindy Phillips sent at 9/24/2020 10:26 AM CDT -----  Regarding: Rx  Contact: Patient @ [phone]  Good Morning  Patient call need Refill..  Requesting an RX refill or new RX.  Is this a refill or new RX:  Refill  RX name and strength: VITAMIN D2 50,000 unit capsule  Directions (copy/paste from chart):    Is this a 30 day or 90 day RX:    Local pharmacy or mail order pharmacy:  Local  Pharmacy name and phone #[id number] [address]     Comments:

## 2020-09-24 NOTE — TELEPHONE ENCOUNTER
No new care gaps identified.  Powered by [employer]. Reference number: [id number]. 9/24/2020 10:47:22 AM   SUSYT

## 2020-09-25 ENCOUNTER — TELEPHONE (OUTPATIENT)
Dept: FAMILY MEDICINE | Facility: CLINIC | Age: 80
End: 2020-09-25

## 2020-09-25 RX ORDER — ERGOCALCIFEROL 1.25 MG/1
CAPSULE ORAL
Qty: 36 CAPSULE | Refills: 0 | Status: SHIPPED | OUTPATIENT
Start: 2020-09-25 | End: 2020-11-07 | Stop reason: SDUPTHER

## 2020-09-25 NOTE — TELEPHONE ENCOUNTER
----- Message from Vickie Woodson sent at 9/25/2020 11:48 AM CDT -----  Type:  RX Refill Request    Who Called: Patient  RX Name and Strength: VITAMIN D2 50,000 unit capsule  Preferred Pharmacy with phone number:  [facility] on [address]  Best Call Back Number:  [phone]  Additional Information: Requesting it be ordered at above pharmacy ONLY

## 2020-10-22 ENCOUNTER — CLINICAL SUPPORT (OUTPATIENT)
Dept: CARDIOLOGY | Facility: CLINIC | Age: 80
End: 2020-10-22
Payer: MEDICARE

## 2020-10-22 DIAGNOSIS — Z95.810 PRESENCE OF AUTOMATIC (IMPLANTABLE) CARDIAC DEFIBRILLATOR: ICD-10-CM

## 2020-10-22 PROCEDURE — 93297 CARDIAC DEVICE CHECK - REMOTE: ICD-10-PCS | Mod: S$GLB,,, | Performed by: INTERNAL MEDICINE

## 2020-10-22 PROCEDURE — 93297 REM INTERROG DEV EVAL ICPMS: CPT | Mod: S$GLB,,, | Performed by: INTERNAL MEDICINE

## 2020-11-09 RX ORDER — ERGOCALCIFEROL 1.25 MG/1
CAPSULE ORAL
Qty: 36 CAPSULE | Refills: 0 | Status: SHIPPED | OUTPATIENT
Start: 2020-11-09 | End: 2020-11-16 | Stop reason: SDUPTHER

## 2020-11-09 NOTE — PROGRESS NOTES
Refill Routing Note   Medication(s) are not appropriate for processing by [facility] for the following reason(s):     - Outside of protocol    [facility] actions taken in this encounter: Route          Medication reconciliation completed: No   Automatic Epic Generated Protocol Data:        Requested Prescriptions   Pending Prescriptions Disp Refills    VITAMIN D2 1,250 mcg (50,000 unit) capsule 36 capsule 0     Sig: TAKE 1 CAPSULE BY MOUTH THREE TIMES A WEEK  [id number]       There is no refill protocol information for this order           Appointments  past 12m or future 3m with PCP    Date Provider   Last Visit   5/26/2020 Turner Anne MD   Next Visit   Visit date not found Turner Anne MD   ED visits in past 90 days: 0        Note composed:7:11 AM 11/09/2020

## 2020-11-16 NOTE — TELEPHONE ENCOUNTER
----- Message from Bryanna Sherman sent at 11/16/2020  9:47 AM CST -----  Contact: pt  Type:  RX Refill Request    Who Called:  pt    Refill or New Rx:  refill  RX Name and Strength:  VITAMIN D2 1,250 mcg (50,000 unit) capsule  How is the patient currently taking it? (ex. 1XDay):  As Directed  Is this a 30 day or 90 day RX:  as Directed  Preferred Pharmacy with phone number:      [facility] #[id number] [place] [address] [address] [address]  Phone: [phone] Fax: [phone]    Best Call Back Number:  [phone]  Additional Information:  Pt is out of the medication and she as been waiting for the medication for a few days and would like it sent in ASAP.  Please Advise ---Thank you

## 2020-11-18 RX ORDER — ERGOCALCIFEROL 1.25 MG/1
CAPSULE ORAL
Qty: 36 CAPSULE | Refills: 3 | Status: SHIPPED | OUTPATIENT
Start: 2020-11-18 | End: 2021-05-25 | Stop reason: SDUPTHER

## 2020-11-20 ENCOUNTER — CLINICAL SUPPORT (OUTPATIENT)
Dept: CARDIOLOGY | Facility: CLINIC | Age: 80
End: 2020-11-20
Payer: MEDICARE

## 2020-11-20 DIAGNOSIS — Z95.810 PRESENCE OF AUTOMATIC (IMPLANTABLE) CARDIAC DEFIBRILLATOR: ICD-10-CM

## 2020-11-21 ENCOUNTER — CLINICAL SUPPORT (OUTPATIENT)
Dept: CARDIOLOGY | Facility: CLINIC | Age: 80
End: 2020-11-21
Payer: MEDICARE

## 2020-11-21 DIAGNOSIS — Z95.810 PRESENCE OF AUTOMATIC (IMPLANTABLE) CARDIAC DEFIBRILLATOR: ICD-10-CM

## 2020-11-21 PROCEDURE — 93297 CARDIAC DEVICE CHECK - REMOTE: ICD-10-PCS | Mod: S$GLB,,, | Performed by: INTERNAL MEDICINE

## 2020-11-21 PROCEDURE — 93297 REM INTERROG DEV EVAL ICPMS: CPT | Mod: S$GLB,,, | Performed by: INTERNAL MEDICINE

## 2020-12-01 ENCOUNTER — TELEPHONE (OUTPATIENT)
Dept: FAMILY MEDICINE | Facility: CLINIC | Age: 80
End: 2020-12-01

## 2020-12-01 DIAGNOSIS — Z12.31 ENCOUNTER FOR SCREENING MAMMOGRAM FOR MALIGNANT NEOPLASM OF BREAST: Primary | ICD-10-CM

## 2020-12-01 NOTE — TELEPHONE ENCOUNTER
----- Message from Graciela Eduardo sent at 12/1/2020 11:55 AM CST -----  Regarding: Mammo  Contact: Patient  Type:  Mammogram    Caller is requesting to schedule their annual mammogram appointment.  Order is not listed in EPIC.  Please enter order and contact patient to schedule.    Name of Caller:  Patient  Where would they like the mammogram performed?  [facility]  Best Call Back Number:  [phone]  Additional Information:   Requesting order & to schedule for January 2021

## 2020-12-21 ENCOUNTER — CLINICAL SUPPORT (OUTPATIENT)
Dept: CARDIOLOGY | Facility: CLINIC | Age: 80
End: 2020-12-21
Payer: MEDICARE

## 2020-12-21 DIAGNOSIS — Z95.810 PRESENCE OF AUTOMATIC (IMPLANTABLE) CARDIAC DEFIBRILLATOR: ICD-10-CM

## 2020-12-21 PROCEDURE — 93297 REM INTERROG DEV EVAL ICPMS: CPT | Mod: S$GLB,,, | Performed by: INTERNAL MEDICINE

## 2020-12-21 PROCEDURE — 93297 CARDIAC DEVICE CHECK - REMOTE: ICD-10-PCS | Mod: S$GLB,,, | Performed by: INTERNAL MEDICINE

## 2021-01-03 ENCOUNTER — PATIENT MESSAGE (OUTPATIENT)
Dept: CARDIOLOGY | Facility: CLINIC | Age: 81
End: 2021-01-03

## 2021-01-03 ENCOUNTER — PATIENT MESSAGE (OUTPATIENT)
Dept: FAMILY MEDICINE | Facility: CLINIC | Age: 81
End: 2021-01-03

## 2021-01-13 ENCOUNTER — PATIENT MESSAGE (OUTPATIENT)
Dept: CARDIOLOGY | Facility: CLINIC | Age: 81
End: 2021-01-13

## 2021-01-14 ENCOUNTER — IMMUNIZATION (OUTPATIENT)
Dept: FAMILY MEDICINE | Facility: CLINIC | Age: 81
End: 2021-01-14
Payer: MEDICARE

## 2021-01-14 DIAGNOSIS — Z23 NEED FOR VACCINATION: ICD-10-CM

## 2021-01-14 PROCEDURE — 91300 COVID-19, MRNA, LNP-S, PF, 30 MCG/0.3 ML DOSE VACCINE: CPT | Mod: PBBFAC | Performed by: INTERNAL MEDICINE

## 2021-01-14 RX ORDER — SACUBITRIL AND VALSARTAN 24; 26 MG/1; MG/1
1 TABLET, FILM COATED ORAL 2 TIMES DAILY
Qty: 180 TABLET | Refills: 3 | Status: SHIPPED | OUTPATIENT
Start: 2021-01-14 | End: 2022-01-10 | Stop reason: SDUPTHER

## 2021-01-20 ENCOUNTER — CLINICAL SUPPORT (OUTPATIENT)
Dept: CARDIOLOGY | Facility: CLINIC | Age: 81
End: 2021-01-20
Payer: MEDICARE

## 2021-02-04 ENCOUNTER — IMMUNIZATION (OUTPATIENT)
Dept: FAMILY MEDICINE | Facility: CLINIC | Age: 81
End: 2021-02-04
Payer: MEDICARE

## 2021-02-04 DIAGNOSIS — Z23 NEED FOR VACCINATION: Primary | ICD-10-CM

## 2021-02-04 PROCEDURE — 0002A COVID-19, MRNA, LNP-S, PF, 30 MCG/0.3 ML DOSE VACCINE: CPT | Mod: PBBFAC | Performed by: FAMILY MEDICINE

## 2021-02-04 PROCEDURE — 91300 COVID-19, MRNA, LNP-S, PF, 30 MCG/0.3 ML DOSE VACCINE: CPT | Mod: PBBFAC | Performed by: FAMILY MEDICINE

## 2021-02-18 ENCOUNTER — CLINICAL SUPPORT (OUTPATIENT)
Dept: CARDIOLOGY | Facility: CLINIC | Age: 81
End: 2021-02-18
Payer: MEDICARE

## 2021-02-18 DIAGNOSIS — Z95.810 PRESENCE OF AUTOMATIC (IMPLANTABLE) CARDIAC DEFIBRILLATOR: ICD-10-CM

## 2021-02-18 PROCEDURE — 93296 REM INTERROG EVL PM/IDS: CPT | Mod: S$GLB,,, | Performed by: INTERNAL MEDICINE

## 2021-02-18 PROCEDURE — 93296 CARDIAC DEVICE CHECK - REMOTE: ICD-10-PCS | Mod: S$GLB,,, | Performed by: INTERNAL MEDICINE

## 2021-02-19 ENCOUNTER — CLINICAL SUPPORT (OUTPATIENT)
Dept: CARDIOLOGY | Facility: CLINIC | Age: 81
End: 2021-02-19
Payer: MEDICARE

## 2021-02-19 DIAGNOSIS — Z95.810 PRESENCE OF AUTOMATIC (IMPLANTABLE) CARDIAC DEFIBRILLATOR: ICD-10-CM

## 2021-02-19 PROCEDURE — 93297 CARDIAC DEVICE CHECK - REMOTE: ICD-10-PCS | Mod: S$GLB,,, | Performed by: INTERNAL MEDICINE

## 2021-02-19 PROCEDURE — 93297 REM INTERROG DEV EVAL ICPMS: CPT | Mod: S$GLB,,, | Performed by: INTERNAL MEDICINE

## 2021-02-25 ENCOUNTER — HOSPITAL ENCOUNTER (OUTPATIENT)
Dept: RADIOLOGY | Facility: HOSPITAL | Age: 81
Discharge: HOME | End: 2021-02-25
Attending: FAMILY MEDICINE
Payer: MEDICARE

## 2021-02-25 DIAGNOSIS — Z12.31 ENCOUNTER FOR SCREENING MAMMOGRAM FOR MALIGNANT NEOPLASM OF BREAST: ICD-10-CM

## 2021-02-25 PROCEDURE — 77067 SCR MAMMO BI INCL CAD: CPT | Mod: 26,,, | Performed by: RADIOLOGY

## 2021-02-25 PROCEDURE — 77067 SCR MAMMO BI INCL CAD: CPT | Mod: TC,PO

## 2021-02-25 PROCEDURE — 77067 MAMMO DIGITAL SCREENING BILAT WITH TOMO: ICD-10-PCS | Mod: 26,,, | Performed by: RADIOLOGY

## 2021-02-25 PROCEDURE — 77063 MAMMO DIGITAL SCREENING BILAT WITH TOMO: ICD-10-PCS | Mod: 26,,, | Performed by: RADIOLOGY

## 2021-02-25 PROCEDURE — 77063 BREAST TOMOSYNTHESIS BI: CPT | Mod: 26,,, | Performed by: RADIOLOGY

## 2021-03-12 ENCOUNTER — PATIENT MESSAGE (OUTPATIENT)
Dept: CARDIOLOGY | Facility: CLINIC | Age: 81
End: 2021-03-12

## 2021-03-21 ENCOUNTER — CLINICAL SUPPORT (OUTPATIENT)
Dept: CARDIOLOGY | Facility: CLINIC | Age: 81
End: 2021-03-21
Payer: MEDICARE

## 2021-03-21 DIAGNOSIS — Z95.810 PRESENCE OF AUTOMATIC (IMPLANTABLE) CARDIAC DEFIBRILLATOR: ICD-10-CM

## 2021-03-22 ENCOUNTER — TELEPHONE (OUTPATIENT)
Dept: CARDIOLOGY | Facility: CLINIC | Age: 81
End: 2021-03-22

## 2021-04-16 DIAGNOSIS — I48.0 PAF (PAROXYSMAL ATRIAL FIBRILLATION): ICD-10-CM

## 2021-04-16 DIAGNOSIS — Z95.810 CARDIAC DEFIBRILLATOR IN SITU: Primary | ICD-10-CM

## 2021-04-20 ENCOUNTER — CLINICAL SUPPORT (OUTPATIENT)
Dept: CARDIOLOGY | Facility: CLINIC | Age: 81
End: 2021-04-20
Payer: MEDICARE

## 2021-04-28 ENCOUNTER — CLINICAL SUPPORT (OUTPATIENT)
Dept: CARDIOLOGY | Facility: CLINIC | Age: 81
End: 2021-04-28
Attending: INTERNAL MEDICINE
Payer: MEDICARE

## 2021-04-28 DIAGNOSIS — I48.0 PAF (PAROXYSMAL ATRIAL FIBRILLATION): ICD-10-CM

## 2021-04-28 DIAGNOSIS — Z95.810 CARDIAC DEFIBRILLATOR IN SITU: ICD-10-CM

## 2021-05-17 ENCOUNTER — TELEPHONE (OUTPATIENT)
Dept: FAMILY MEDICINE | Facility: CLINIC | Age: 81
End: 2021-05-17

## 2021-05-19 ENCOUNTER — CLINICAL SUPPORT (OUTPATIENT)
Dept: CARDIOLOGY | Facility: HOSPITAL | Age: 81
End: 2021-05-19
Payer: MEDICARE

## 2021-05-19 DIAGNOSIS — Z95.810 PRESENCE OF AUTOMATIC (IMPLANTABLE) CARDIAC DEFIBRILLATOR: ICD-10-CM

## 2021-05-19 PROCEDURE — 93296 REM INTERROG EVL PM/IDS: CPT | Mod: PO | Performed by: INTERNAL MEDICINE

## 2021-05-20 ENCOUNTER — CLINICAL SUPPORT (OUTPATIENT)
Dept: CARDIOLOGY | Facility: HOSPITAL | Age: 81
End: 2021-05-20
Payer: MEDICARE

## 2021-05-20 ENCOUNTER — HOSPITAL ENCOUNTER (OUTPATIENT)
Dept: CARDIOLOGY | Facility: HOSPITAL | Age: 81
Discharge: HOME | End: 2021-05-20
Payer: MEDICARE

## 2021-05-20 DIAGNOSIS — Z95.810 PRESENCE OF AUTOMATIC (IMPLANTABLE) CARDIAC DEFIBRILLATOR: ICD-10-CM

## 2021-05-20 PROCEDURE — G2066 INTER DEVC REMOTE 30D: HCPCS | Mod: PO | Performed by: INTERNAL MEDICINE

## 2021-05-20 PROCEDURE — 93297 REM INTERROG DEV EVAL ICPMS: CPT | Mod: ,,, | Performed by: INTERNAL MEDICINE

## 2021-05-20 PROCEDURE — 93297 CARDIAC DEVICE CHECK - REMOTE: ICD-10-PCS | Mod: ,,, | Performed by: INTERNAL MEDICINE

## 2021-05-25 ENCOUNTER — OFFICE VISIT (OUTPATIENT)
Dept: FAMILY MEDICINE | Facility: CLINIC | Age: 81
End: 2021-05-25
Payer: MEDICARE

## 2021-05-25 ENCOUNTER — TELEPHONE (OUTPATIENT)
Dept: FAMILY MEDICINE | Facility: CLINIC | Age: 81
End: 2021-05-25

## 2021-05-25 VITALS
BODY MASS INDEX: 27.29 KG/M2 | DIASTOLIC BLOOD PRESSURE: 68 MMHG | HEIGHT: 65 IN | HEART RATE: 95 BPM | SYSTOLIC BLOOD PRESSURE: 108 MMHG | OXYGEN SATURATION: 98 % | WEIGHT: 163.81 LBS

## 2021-05-25 DIAGNOSIS — E78.5 HYPERLIPIDEMIA, UNSPECIFIED HYPERLIPIDEMIA TYPE: ICD-10-CM

## 2021-05-25 DIAGNOSIS — I10 ESSENTIAL HYPERTENSION: ICD-10-CM

## 2021-05-25 DIAGNOSIS — Z00.00 ROUTINE MEDICAL EXAM: Primary | ICD-10-CM

## 2021-05-25 DIAGNOSIS — E55.9 VITAMIN D DEFICIENCY: ICD-10-CM

## 2021-05-25 DIAGNOSIS — I48.20 CHRONIC ATRIAL FIBRILLATION: ICD-10-CM

## 2021-05-25 PROCEDURE — 99999 PR PBB SHADOW E&M-EST. PATIENT-LVL IV: CPT | Mod: PBBFAC,,, | Performed by: FAMILY MEDICINE

## 2021-05-25 PROCEDURE — 99397 PER PM REEVAL EST PAT 65+ YR: CPT | Mod: S$PBB,,, | Performed by: FAMILY MEDICINE

## 2021-05-25 PROCEDURE — 99214 OFFICE O/P EST MOD 30 MIN: CPT | Mod: PBBFAC,PO | Performed by: FAMILY MEDICINE

## 2021-05-25 PROCEDURE — 99999 PR PBB SHADOW E&M-EST. PATIENT-LVL IV: ICD-10-PCS | Mod: PBBFAC,,, | Performed by: FAMILY MEDICINE

## 2021-05-25 PROCEDURE — 99397 PR PREVENTIVE VISIT,EST,65 & OVER: ICD-10-PCS | Mod: S$PBB,,, | Performed by: FAMILY MEDICINE

## 2021-05-25 RX ORDER — ERGOCALCIFEROL 1.25 MG/1
CAPSULE ORAL
Qty: 36 CAPSULE | Refills: 3 | Status: SHIPPED | OUTPATIENT
Start: 2021-05-25 | End: 2022-03-14 | Stop reason: SDUPTHER

## 2021-05-25 RX ORDER — CYCLOSPORINE 0.5 MG/ML
1 EMULSION OPHTHALMIC 2 TIMES DAILY
COMMUNITY
Start: 2021-05-04 | End: 2022-06-25

## 2021-05-25 RX ORDER — HYDROCORTISONE 25 MG/G
CREAM TOPICAL
COMMUNITY
Start: 2021-03-10

## 2021-05-25 RX ORDER — KETOCONAZOLE 20 MG/G
CREAM TOPICAL
COMMUNITY
Start: 2021-03-10

## 2021-06-01 ENCOUNTER — PATIENT MESSAGE (OUTPATIENT)
Dept: CARDIOLOGY | Facility: CLINIC | Age: 81
End: 2021-06-01

## 2021-06-02 ENCOUNTER — LAB VISIT (OUTPATIENT)
Dept: LAB | Facility: HOSPITAL | Age: 81
End: 2021-06-02
Attending: FAMILY MEDICINE
Payer: MEDICARE

## 2021-06-02 DIAGNOSIS — I10 ESSENTIAL HYPERTENSION: ICD-10-CM

## 2021-06-02 DIAGNOSIS — I48.20 CHRONIC ATRIAL FIBRILLATION: ICD-10-CM

## 2021-06-02 DIAGNOSIS — E55.9 VITAMIN D DEFICIENCY: ICD-10-CM

## 2021-06-02 LAB
25(OH)D3+25(OH)D2 SERPL-MCNC: 43 NG/ML (ref 30–96)
ALBUMIN SERPL BCP-MCNC: 3.7 G/DL (ref 3.5–5.2)
ALP SERPL-CCNC: 87 U/L (ref 55–135)
ALT SERPL W/O P-5'-P-CCNC: 14 U/L (ref 10–44)
ANION GAP SERPL CALC-SCNC: 8 MMOL/L (ref 8–16)
AST SERPL-CCNC: 20 U/L (ref 10–40)
BILIRUB SERPL-MCNC: 0.7 MG/DL (ref 0.1–1)
BNP SERPL-MCNC: 219 PG/ML (ref 0–99)
BUN SERPL-MCNC: 22 MG/DL (ref 8–23)
CALCIUM SERPL-MCNC: 9.1 MG/DL (ref 8.7–10.5)
CHLORIDE SERPL-SCNC: 108 MMOL/L (ref 95–110)
CHOLEST SERPL-MCNC: 131 MG/DL (ref 120–199)
CHOLEST/HDLC SERPL: 2.6 {RATIO} (ref 2–5)
CO2 SERPL-SCNC: 24 MMOL/L (ref 23–29)
CREAT SERPL-MCNC: 1.1 MG/DL (ref 0.5–1.4)
EST. GFR  (AFRICAN AMERICAN): 54 ML/MIN/1.73 M^2
EST. GFR  (NON AFRICAN AMERICAN): 46.9 ML/MIN/1.73 M^2
GLUCOSE SERPL-MCNC: 93 MG/DL (ref 70–110)
HDLC SERPL-MCNC: 51 MG/DL (ref 40–75)
HDLC SERPL: 38.9 % (ref 20–50)
LDLC SERPL CALC-MCNC: 66.6 MG/DL (ref 63–159)
NONHDLC SERPL-MCNC: 80 MG/DL
POTASSIUM SERPL-SCNC: 5.1 MMOL/L (ref 3.5–5.1)
PROT SERPL-MCNC: 6.7 G/DL (ref 6–8.4)
SODIUM SERPL-SCNC: 140 MMOL/L (ref 136–145)
TRIGL SERPL-MCNC: 67 MG/DL (ref 30–150)

## 2021-06-02 PROCEDURE — 83880 ASSAY OF NATRIURETIC PEPTIDE: CPT | Performed by: FAMILY MEDICINE

## 2021-06-02 PROCEDURE — 80053 COMPREHEN METABOLIC PANEL: CPT | Performed by: FAMILY MEDICINE

## 2021-06-02 PROCEDURE — 80061 LIPID PANEL: CPT | Performed by: FAMILY MEDICINE

## 2021-06-02 PROCEDURE — 36415 COLL VENOUS BLD VENIPUNCTURE: CPT | Mod: PO | Performed by: FAMILY MEDICINE

## 2021-06-02 PROCEDURE — 82306 VITAMIN D 25 HYDROXY: CPT | Performed by: FAMILY MEDICINE

## 2021-06-09 ENCOUNTER — OFFICE VISIT (OUTPATIENT)
Dept: CARDIOLOGY | Facility: CLINIC | Age: 81
End: 2021-06-09
Payer: MEDICARE

## 2021-06-09 VITALS
HEART RATE: 67 BPM | BODY MASS INDEX: 27.29 KG/M2 | WEIGHT: 163.81 LBS | HEIGHT: 65 IN | DIASTOLIC BLOOD PRESSURE: 59 MMHG | SYSTOLIC BLOOD PRESSURE: 112 MMHG

## 2021-06-09 DIAGNOSIS — Z95.810 ICD (IMPLANTABLE CARDIOVERTER-DEFIBRILLATOR) IN PLACE: Primary | ICD-10-CM

## 2021-06-09 DIAGNOSIS — I48.0 PAF (PAROXYSMAL ATRIAL FIBRILLATION): ICD-10-CM

## 2021-06-09 DIAGNOSIS — I25.10 CORONARY ARTERY DISEASE INVOLVING NATIVE CORONARY ARTERY OF NATIVE HEART WITHOUT ANGINA PECTORIS: ICD-10-CM

## 2021-06-09 PROCEDURE — 1126F AMNT PAIN NOTED NONE PRSNT: CPT | Mod: S$GLB,,, | Performed by: INTERNAL MEDICINE

## 2021-06-09 PROCEDURE — 99214 PR OFFICE/OUTPT VISIT, EST, LEVL IV, 30-39 MIN: ICD-10-PCS | Mod: S$GLB,,, | Performed by: INTERNAL MEDICINE

## 2021-06-09 PROCEDURE — 99214 OFFICE O/P EST MOD 30 MIN: CPT | Mod: S$GLB,,, | Performed by: INTERNAL MEDICINE

## 2021-06-09 PROCEDURE — 1159F PR MEDICATION LIST DOCUMENTED IN MEDICAL RECORD: ICD-10-PCS | Mod: S$GLB,,, | Performed by: INTERNAL MEDICINE

## 2021-06-09 PROCEDURE — 3288F PR FALLS RISK ASSESSMENT DOCUMENTED: ICD-10-PCS | Mod: S$GLB,,, | Performed by: INTERNAL MEDICINE

## 2021-06-09 PROCEDURE — 1159F MED LIST DOCD IN RCRD: CPT | Mod: S$GLB,,, | Performed by: INTERNAL MEDICINE

## 2021-06-09 PROCEDURE — 3288F FALL RISK ASSESSMENT DOCD: CPT | Mod: S$GLB,,, | Performed by: INTERNAL MEDICINE

## 2021-06-09 PROCEDURE — 99999 PR PBB SHADOW E&M-EST. PATIENT-LVL III: CPT | Mod: PBBFAC,,, | Performed by: INTERNAL MEDICINE

## 2021-06-09 PROCEDURE — 1101F PT FALLS ASSESS-DOCD LE1/YR: CPT | Mod: S$GLB,,, | Performed by: INTERNAL MEDICINE

## 2021-06-09 PROCEDURE — 99999 PR PBB SHADOW E&M-EST. PATIENT-LVL III: ICD-10-PCS | Mod: PBBFAC,,, | Performed by: INTERNAL MEDICINE

## 2021-06-09 PROCEDURE — 1126F PR PAIN SEVERITY QUANTIFIED, NO PAIN PRESENT: ICD-10-PCS | Mod: S$GLB,,, | Performed by: INTERNAL MEDICINE

## 2021-06-09 PROCEDURE — 1101F PR PT FALLS ASSESS DOC 0-1 FALLS W/OUT INJ PAST YR: ICD-10-PCS | Mod: S$GLB,,, | Performed by: INTERNAL MEDICINE

## 2021-06-19 ENCOUNTER — CLINICAL SUPPORT (OUTPATIENT)
Dept: CARDIOLOGY | Facility: HOSPITAL | Age: 81
End: 2021-06-19
Payer: MEDICARE

## 2021-06-19 ENCOUNTER — HOSPITAL ENCOUNTER (OUTPATIENT)
Dept: CARDIOLOGY | Facility: HOSPITAL | Age: 81
Discharge: HOME | End: 2021-06-19
Payer: MEDICARE

## 2021-06-19 DIAGNOSIS — Z95.810 PRESENCE OF AUTOMATIC (IMPLANTABLE) CARDIAC DEFIBRILLATOR: ICD-10-CM

## 2021-06-19 PROCEDURE — G2066 INTER DEVC REMOTE 30D: HCPCS | Mod: PO | Performed by: INTERNAL MEDICINE

## 2021-06-19 PROCEDURE — 93297 REM INTERROG DEV EVAL ICPMS: CPT | Mod: ,,, | Performed by: INTERNAL MEDICINE

## 2021-06-19 PROCEDURE — 93297 CARDIAC DEVICE CHECK - REMOTE: ICD-10-PCS | Mod: ,,, | Performed by: INTERNAL MEDICINE

## 2021-06-28 PROBLEM — K40.90 NON-RECURRENT UNILATERAL INGUINAL HERNIA WITHOUT OBSTRUCTION OR GANGRENE: Status: ACTIVE | Noted: 2021-06-28

## 2021-07-19 ENCOUNTER — CLINICAL SUPPORT (OUTPATIENT)
Dept: CARDIOLOGY | Facility: HOSPITAL | Age: 81
End: 2021-07-19
Payer: MEDICARE

## 2021-07-19 DIAGNOSIS — Z95.810 PRESENCE OF AUTOMATIC (IMPLANTABLE) CARDIAC DEFIBRILLATOR: ICD-10-CM

## 2021-07-19 PROCEDURE — 93297 REM INTERROG DEV EVAL ICPMS: CPT | Mod: ,,, | Performed by: INTERNAL MEDICINE

## 2021-07-19 PROCEDURE — 93297 CARDIAC DEVICE CHECK - REMOTE: ICD-10-PCS | Mod: ,,, | Performed by: INTERNAL MEDICINE

## 2021-07-19 PROCEDURE — G2066 INTER DEVC REMOTE 30D: HCPCS | Mod: PO | Performed by: INTERNAL MEDICINE

## 2021-08-17 ENCOUNTER — APPOINTMENT (OUTPATIENT)
Dept: CARDIOLOGY | Facility: HOSPITAL | Age: 81
End: 2021-08-17
Payer: MEDICARE

## 2021-08-17 ENCOUNTER — CLINICAL SUPPORT (OUTPATIENT)
Dept: CARDIOLOGY | Facility: HOSPITAL | Age: 81
End: 2021-08-17
Payer: MEDICARE

## 2021-08-17 DIAGNOSIS — Z95.810 PRESENCE OF AUTOMATIC (IMPLANTABLE) CARDIAC DEFIBRILLATOR: ICD-10-CM

## 2021-08-17 PROCEDURE — 93296 REM INTERROG EVL PM/IDS: CPT | Mod: PO | Performed by: INTERNAL MEDICINE

## 2021-08-17 PROCEDURE — 93295 CARDIAC DEVICE CHECK - REMOTE: ICD-10-PCS | Mod: ,,, | Performed by: INTERNAL MEDICINE

## 2021-08-17 PROCEDURE — 93295 DEV INTERROG REMOTE 1/2/MLT: CPT | Mod: ,,, | Performed by: INTERNAL MEDICINE

## 2021-08-18 ENCOUNTER — CLINICAL SUPPORT (OUTPATIENT)
Dept: CARDIOLOGY | Facility: HOSPITAL | Age: 81
End: 2021-08-18
Payer: MEDICARE

## 2021-08-18 DIAGNOSIS — Z95.810 PRESENCE OF AUTOMATIC (IMPLANTABLE) CARDIAC DEFIBRILLATOR: ICD-10-CM

## 2021-08-18 PROCEDURE — 93297 CARDIAC DEVICE CHECK - REMOTE: ICD-10-PCS | Mod: ,,, | Performed by: INTERNAL MEDICINE

## 2021-08-18 PROCEDURE — G2066 INTER DEVC REMOTE 30D: HCPCS | Mod: PO | Performed by: INTERNAL MEDICINE

## 2021-08-18 PROCEDURE — 93297 REM INTERROG DEV EVAL ICPMS: CPT | Mod: ,,, | Performed by: INTERNAL MEDICINE

## 2021-09-17 ENCOUNTER — CLINICAL SUPPORT (OUTPATIENT)
Dept: CARDIOLOGY | Facility: HOSPITAL | Age: 81
End: 2021-09-17
Payer: MEDICARE

## 2021-09-17 DIAGNOSIS — Z95.810 PRESENCE OF AUTOMATIC (IMPLANTABLE) CARDIAC DEFIBRILLATOR: ICD-10-CM

## 2021-09-17 PROCEDURE — 93297 CARDIAC DEVICE CHECK - REMOTE: ICD-10-PCS | Mod: ,,, | Performed by: INTERNAL MEDICINE

## 2021-09-17 PROCEDURE — G2066 INTER DEVC REMOTE 30D: HCPCS | Mod: PO | Performed by: INTERNAL MEDICINE

## 2021-09-17 PROCEDURE — 93297 REM INTERROG DEV EVAL ICPMS: CPT | Mod: ,,, | Performed by: INTERNAL MEDICINE

## 2021-09-29 ENCOUNTER — IMMUNIZATION (OUTPATIENT)
Dept: FAMILY MEDICINE | Facility: CLINIC | Age: 81
End: 2021-09-29
Payer: MEDICARE

## 2021-09-29 DIAGNOSIS — Z23 NEED FOR VACCINATION: Primary | ICD-10-CM

## 2021-09-29 PROCEDURE — 0003A COVID-19, MRNA, LNP-S, PF, 30 MCG/0.3 ML DOSE VACCINE: CPT | Mod: PBBFAC | Performed by: FAMILY MEDICINE

## 2021-09-29 PROCEDURE — 91300 COVID-19, MRNA, LNP-S, PF, 30 MCG/0.3 ML DOSE VACCINE: CPT | Mod: PBBFAC | Performed by: FAMILY MEDICINE

## 2021-10-17 ENCOUNTER — CLINICAL SUPPORT (OUTPATIENT)
Dept: CARDIOLOGY | Facility: HOSPITAL | Age: 81
End: 2021-10-17
Payer: MEDICARE

## 2021-10-17 DIAGNOSIS — Z95.810 PRESENCE OF AUTOMATIC (IMPLANTABLE) CARDIAC DEFIBRILLATOR: ICD-10-CM

## 2021-10-17 PROCEDURE — 93297 CARDIAC DEVICE CHECK - REMOTE: ICD-10-PCS | Mod: ,,, | Performed by: INTERNAL MEDICINE

## 2021-10-17 PROCEDURE — G2066 INTER DEVC REMOTE 30D: HCPCS | Mod: PO | Performed by: INTERNAL MEDICINE

## 2021-10-17 PROCEDURE — 93297 REM INTERROG DEV EVAL ICPMS: CPT | Mod: ,,, | Performed by: INTERNAL MEDICINE

## 2021-10-18 ENCOUNTER — IMMUNIZATION (OUTPATIENT)
Dept: PHARMACY | Facility: CLINIC | Age: 81
End: 2021-10-18
Payer: MEDICARE

## 2021-11-10 ENCOUNTER — OFFICE VISIT (OUTPATIENT)
Dept: FAMILY MEDICINE | Facility: CLINIC | Age: 81
End: 2021-11-10
Payer: MEDICARE

## 2021-11-10 VITALS
HEIGHT: 65 IN | BODY MASS INDEX: 27.56 KG/M2 | SYSTOLIC BLOOD PRESSURE: 114 MMHG | WEIGHT: 165.38 LBS | HEART RATE: 74 BPM | DIASTOLIC BLOOD PRESSURE: 64 MMHG | OXYGEN SATURATION: 97 %

## 2021-11-10 DIAGNOSIS — J20.9 ACUTE BRONCHITIS, UNSPECIFIED ORGANISM: Primary | ICD-10-CM

## 2021-11-10 PROCEDURE — 1101F PT FALLS ASSESS-DOCD LE1/YR: CPT | Mod: S$GLB,,, | Performed by: PHYSICIAN ASSISTANT

## 2021-11-10 PROCEDURE — 1101F PR PT FALLS ASSESS DOC 0-1 FALLS W/OUT INJ PAST YR: ICD-10-PCS | Mod: S$GLB,,, | Performed by: PHYSICIAN ASSISTANT

## 2021-11-10 PROCEDURE — 1159F MED LIST DOCD IN RCRD: CPT | Mod: S$GLB,,, | Performed by: PHYSICIAN ASSISTANT

## 2021-11-10 PROCEDURE — 3074F PR MOST RECENT SYSTOLIC BLOOD PRESSURE < 130 MM HG: ICD-10-PCS | Mod: S$GLB,,, | Performed by: PHYSICIAN ASSISTANT

## 2021-11-10 PROCEDURE — 99999 PR PBB SHADOW E&M-EST. PATIENT-LVL V: CPT | Mod: PBBFAC,,, | Performed by: PHYSICIAN ASSISTANT

## 2021-11-10 PROCEDURE — 3074F SYST BP LT 130 MM HG: CPT | Mod: S$GLB,,, | Performed by: PHYSICIAN ASSISTANT

## 2021-11-10 PROCEDURE — 3288F FALL RISK ASSESSMENT DOCD: CPT | Mod: S$GLB,,, | Performed by: PHYSICIAN ASSISTANT

## 2021-11-10 PROCEDURE — 1160F RVW MEDS BY RX/DR IN RCRD: CPT | Mod: S$GLB,,, | Performed by: PHYSICIAN ASSISTANT

## 2021-11-10 PROCEDURE — 3288F PR FALLS RISK ASSESSMENT DOCUMENTED: ICD-10-PCS | Mod: S$GLB,,, | Performed by: PHYSICIAN ASSISTANT

## 2021-11-10 PROCEDURE — 99213 OFFICE O/P EST LOW 20 MIN: CPT | Mod: S$GLB,,, | Performed by: PHYSICIAN ASSISTANT

## 2021-11-10 PROCEDURE — 1159F PR MEDICATION LIST DOCUMENTED IN MEDICAL RECORD: ICD-10-PCS | Mod: S$GLB,,, | Performed by: PHYSICIAN ASSISTANT

## 2021-11-10 PROCEDURE — 1160F PR REVIEW ALL MEDS BY PRESCRIBER/CLIN PHARMACIST DOCUMENTED: ICD-10-PCS | Mod: S$GLB,,, | Performed by: PHYSICIAN ASSISTANT

## 2021-11-10 PROCEDURE — 99213 PR OFFICE/OUTPT VISIT, EST, LEVL III, 20-29 MIN: ICD-10-PCS | Mod: S$GLB,,, | Performed by: PHYSICIAN ASSISTANT

## 2021-11-10 PROCEDURE — 1126F PR PAIN SEVERITY QUANTIFIED, NO PAIN PRESENT: ICD-10-PCS | Mod: S$GLB,,, | Performed by: PHYSICIAN ASSISTANT

## 2021-11-10 PROCEDURE — 3078F DIAST BP <80 MM HG: CPT | Mod: S$GLB,,, | Performed by: PHYSICIAN ASSISTANT

## 2021-11-10 PROCEDURE — 3078F PR MOST RECENT DIASTOLIC BLOOD PRESSURE < 80 MM HG: ICD-10-PCS | Mod: S$GLB,,, | Performed by: PHYSICIAN ASSISTANT

## 2021-11-10 PROCEDURE — 1126F AMNT PAIN NOTED NONE PRSNT: CPT | Mod: S$GLB,,, | Performed by: PHYSICIAN ASSISTANT

## 2021-11-10 PROCEDURE — 99999 PR PBB SHADOW E&M-EST. PATIENT-LVL V: ICD-10-PCS | Mod: PBBFAC,,, | Performed by: PHYSICIAN ASSISTANT

## 2021-11-15 ENCOUNTER — CLINICAL SUPPORT (OUTPATIENT)
Dept: CARDIOLOGY | Facility: HOSPITAL | Age: 81
End: 2021-11-15
Payer: MEDICARE

## 2021-11-15 DIAGNOSIS — Z95.810 PRESENCE OF AUTOMATIC (IMPLANTABLE) CARDIAC DEFIBRILLATOR: ICD-10-CM

## 2021-11-15 PROCEDURE — 93295 CARDIAC DEVICE CHECK - REMOTE: ICD-10-PCS | Mod: ,,, | Performed by: INTERNAL MEDICINE

## 2021-11-15 PROCEDURE — 93295 DEV INTERROG REMOTE 1/2/MLT: CPT | Mod: ,,, | Performed by: INTERNAL MEDICINE

## 2021-11-15 PROCEDURE — 93296 REM INTERROG EVL PM/IDS: CPT | Mod: PO | Performed by: INTERNAL MEDICINE

## 2021-11-16 ENCOUNTER — CLINICAL SUPPORT (OUTPATIENT)
Dept: CARDIOLOGY | Facility: HOSPITAL | Age: 81
End: 2021-11-16
Payer: MEDICARE

## 2021-11-16 DIAGNOSIS — Z95.810 PRESENCE OF AUTOMATIC (IMPLANTABLE) CARDIAC DEFIBRILLATOR: ICD-10-CM

## 2021-11-16 PROCEDURE — 93297 REM INTERROG DEV EVAL ICPMS: CPT | Mod: ,,, | Performed by: INTERNAL MEDICINE

## 2021-11-16 PROCEDURE — 93297 CARDIAC DEVICE CHECK - REMOTE: ICD-10-PCS | Mod: ,,, | Performed by: INTERNAL MEDICINE

## 2021-11-16 PROCEDURE — G2066 INTER DEVC REMOTE 30D: HCPCS | Mod: PO | Performed by: INTERNAL MEDICINE

## 2021-12-07 ENCOUNTER — PATIENT OUTREACH (OUTPATIENT)
Dept: ADMINISTRATIVE | Facility: OTHER | Age: 81
End: 2021-12-07
Payer: MEDICARE

## 2021-12-08 ENCOUNTER — OFFICE VISIT (OUTPATIENT)
Dept: PODIATRY | Facility: CLINIC | Age: 81
End: 2021-12-08
Payer: MEDICARE

## 2021-12-08 VITALS — HEIGHT: 65 IN | WEIGHT: 165.38 LBS | BODY MASS INDEX: 27.56 KG/M2

## 2021-12-08 DIAGNOSIS — B07.0 PLANTAR WART OF BOTH FEET: Primary | ICD-10-CM

## 2021-12-08 PROCEDURE — 99999 PR PBB SHADOW E&M-EST. PATIENT-LVL III: ICD-10-PCS | Mod: PBBFAC,,, | Performed by: STUDENT IN AN ORGANIZED HEALTH CARE EDUCATION/TRAINING PROGRAM

## 2021-12-08 PROCEDURE — 99999 PR PBB SHADOW E&M-EST. PATIENT-LVL III: CPT | Mod: PBBFAC,,, | Performed by: STUDENT IN AN ORGANIZED HEALTH CARE EDUCATION/TRAINING PROGRAM

## 2021-12-08 PROCEDURE — 99203 OFFICE O/P NEW LOW 30 MIN: CPT | Mod: S$GLB,,, | Performed by: STUDENT IN AN ORGANIZED HEALTH CARE EDUCATION/TRAINING PROGRAM

## 2021-12-08 PROCEDURE — 99203 PR OFFICE/OUTPT VISIT, NEW, LEVL III, 30-44 MIN: ICD-10-PCS | Mod: S$GLB,,, | Performed by: STUDENT IN AN ORGANIZED HEALTH CARE EDUCATION/TRAINING PROGRAM

## 2021-12-14 ENCOUNTER — OFFICE VISIT (OUTPATIENT)
Dept: CARDIOLOGY | Facility: CLINIC | Age: 81
End: 2021-12-14
Payer: MEDICARE

## 2021-12-14 VITALS
HEIGHT: 65 IN | BODY MASS INDEX: 27.32 KG/M2 | SYSTOLIC BLOOD PRESSURE: 103 MMHG | WEIGHT: 164 LBS | HEART RATE: 77 BPM | DIASTOLIC BLOOD PRESSURE: 64 MMHG

## 2021-12-14 DIAGNOSIS — I25.10 CORONARY ARTERY DISEASE INVOLVING NATIVE CORONARY ARTERY OF NATIVE HEART WITHOUT ANGINA PECTORIS: Primary | ICD-10-CM

## 2021-12-14 DIAGNOSIS — I10 PRIMARY HYPERTENSION: ICD-10-CM

## 2021-12-14 DIAGNOSIS — I48.0 PAROXYSMAL ATRIAL FIBRILLATION: ICD-10-CM

## 2021-12-14 DIAGNOSIS — Z95.1 S/P CABG (CORONARY ARTERY BYPASS GRAFT): ICD-10-CM

## 2021-12-14 DIAGNOSIS — I42.0 ISCHEMIC DILATED CARDIOMYOPATHY: ICD-10-CM

## 2021-12-14 DIAGNOSIS — I25.5 ISCHEMIC DILATED CARDIOMYOPATHY: ICD-10-CM

## 2021-12-14 PROCEDURE — 99999 PR PBB SHADOW E&M-EST. PATIENT-LVL III: CPT | Mod: PBBFAC,,, | Performed by: INTERNAL MEDICINE

## 2021-12-14 PROCEDURE — 99214 OFFICE O/P EST MOD 30 MIN: CPT | Mod: S$GLB,,, | Performed by: INTERNAL MEDICINE

## 2021-12-14 PROCEDURE — 99214 PR OFFICE/OUTPT VISIT, EST, LEVL IV, 30-39 MIN: ICD-10-PCS | Mod: S$GLB,,, | Performed by: INTERNAL MEDICINE

## 2021-12-14 PROCEDURE — 99999 PR PBB SHADOW E&M-EST. PATIENT-LVL III: ICD-10-PCS | Mod: PBBFAC,,, | Performed by: INTERNAL MEDICINE

## 2021-12-16 ENCOUNTER — CLINICAL SUPPORT (OUTPATIENT)
Dept: CARDIOLOGY | Facility: HOSPITAL | Age: 81
End: 2021-12-16
Payer: MEDICARE

## 2022-01-10 DIAGNOSIS — I25.10 CORONARY ARTERY DISEASE INVOLVING NATIVE CORONARY ARTERY OF NATIVE HEART WITHOUT ANGINA PECTORIS: ICD-10-CM

## 2022-01-10 DIAGNOSIS — I10 ESSENTIAL HYPERTENSION: ICD-10-CM

## 2022-01-10 RX ORDER — SACUBITRIL AND VALSARTAN 24; 26 MG/1; MG/1
1 TABLET, FILM COATED ORAL 2 TIMES DAILY
Qty: 180 TABLET | Refills: 3 | Status: SHIPPED | OUTPATIENT
Start: 2022-01-10 | End: 2023-01-21 | Stop reason: SDUPTHER

## 2022-01-10 RX ORDER — METOPROLOL SUCCINATE 25 MG/1
25 TABLET, EXTENDED RELEASE ORAL 2 TIMES DAILY
Qty: 180 TABLET | Refills: 3 | Status: ON HOLD | OUTPATIENT
Start: 2022-01-10 | End: 2022-06-25 | Stop reason: HOSPADM

## 2022-01-10 RX ORDER — FUROSEMIDE 20 MG/1
20 TABLET ORAL DAILY
Qty: 90 TABLET | Refills: 3 | Status: SHIPPED | OUTPATIENT
Start: 2022-01-10 | End: 2023-01-21 | Stop reason: SDUPTHER

## 2022-01-14 ENCOUNTER — TELEPHONE (OUTPATIENT)
Dept: OPHTHALMOLOGY | Facility: CLINIC | Age: 82
End: 2022-01-14
Payer: MEDICARE

## 2022-01-14 NOTE — TELEPHONE ENCOUNTER
Called to discuss message, pt had questions about new insurance and establishing care here with new providers. Pt was recently seen by Ophthalmology and wants next visit to be with us. Pt stated she would call back to schedule f/u.       ----- Message from Mago Masters sent at 1/14/2022 10:35 AM CST -----  Contact: patient  Type: Needs Medical Advice  Who Called:  patient   Best Call Back Number: [phone]  Additional Information: patient is wanting to start seeing someone over there- she has some questions and is requesting a call back to discuss before she sets up the appt

## 2022-02-13 ENCOUNTER — CLINICAL SUPPORT (OUTPATIENT)
Dept: CARDIOLOGY | Facility: HOSPITAL | Age: 82
End: 2022-02-13
Payer: MEDICARE

## 2022-02-13 DIAGNOSIS — Z95.810 PRESENCE OF AUTOMATIC (IMPLANTABLE) CARDIAC DEFIBRILLATOR: ICD-10-CM

## 2022-02-13 PROCEDURE — 93296 REM INTERROG EVL PM/IDS: CPT | Mod: PO | Performed by: INTERNAL MEDICINE

## 2022-02-21 DIAGNOSIS — E78.5 HYPERLIPIDEMIA, UNSPECIFIED HYPERLIPIDEMIA TYPE: ICD-10-CM

## 2022-02-21 RX ORDER — ROSUVASTATIN CALCIUM 5 MG/1
5 TABLET, COATED ORAL NIGHTLY
Qty: 90 TABLET | Refills: 3 | Status: SHIPPED | OUTPATIENT
Start: 2022-02-21 | End: 2023-01-21 | Stop reason: SDUPTHER

## 2022-02-21 RX ORDER — SPIRONOLACTONE 25 MG/1
12.5 TABLET ORAL DAILY
Qty: 46 TABLET | Refills: 3 | Status: SHIPPED | OUTPATIENT
Start: 2022-02-21 | End: 2023-01-22 | Stop reason: SDUPTHER

## 2022-03-02 ENCOUNTER — TELEPHONE (OUTPATIENT)
Dept: FAMILY MEDICINE | Facility: CLINIC | Age: 82
End: 2022-03-02
Payer: MEDICARE

## 2022-03-02 NOTE — TELEPHONE ENCOUNTER
Left a message, trying to see if she wants the mammogram before her next appt.       ----- Message from Teresa Coleman sent at 3/2/2022 11:13 AM CST -----  Type: Needs Medical Advice  Who Called:  Patient  Best Call Back Number: [phone]  Additional Information: Calling to request orders for a mammogram.

## 2022-03-14 ENCOUNTER — OFFICE VISIT (OUTPATIENT)
Dept: FAMILY MEDICINE | Facility: CLINIC | Age: 82
End: 2022-03-14
Payer: MEDICARE

## 2022-03-14 ENCOUNTER — PATIENT MESSAGE (OUTPATIENT)
Dept: FAMILY MEDICINE | Facility: CLINIC | Age: 82
End: 2022-03-14

## 2022-03-14 VITALS
WEIGHT: 169.06 LBS | SYSTOLIC BLOOD PRESSURE: 126 MMHG | OXYGEN SATURATION: 98 % | DIASTOLIC BLOOD PRESSURE: 74 MMHG | HEART RATE: 76 BPM | BODY MASS INDEX: 28.17 KG/M2 | HEIGHT: 65 IN

## 2022-03-14 DIAGNOSIS — R35.0 FREQUENT URINATION: ICD-10-CM

## 2022-03-14 DIAGNOSIS — N30.01 ACUTE CYSTITIS WITH HEMATURIA: Primary | ICD-10-CM

## 2022-03-14 LAB
BACTERIA #/AREA URNS HPF: NORMAL /HPF
BILIRUB UR QL STRIP: NEGATIVE
CLARITY UR: CLEAR
COLOR UR: YELLOW
GLUCOSE UR QL STRIP: NEGATIVE
HGB UR QL STRIP: ABNORMAL
HYALINE CASTS #/AREA URNS LPF: 1 /LPF
KETONES UR QL STRIP: NEGATIVE
LEUKOCYTE ESTERASE UR QL STRIP: NEGATIVE
MICROSCOPIC COMMENT: NORMAL
NITRITE UR QL STRIP: NEGATIVE
PH UR STRIP: 6 [PH] (ref 5–8)
PROT UR QL STRIP: NEGATIVE
RBC #/AREA URNS HPF: 0 /HPF (ref 0–4)
SP GR UR STRIP: 1.02 (ref 1–1.03)
URN SPEC COLLECT METH UR: ABNORMAL
WBC #/AREA URNS HPF: 1 /HPF (ref 0–5)

## 2022-03-14 PROCEDURE — 99999 PR PBB SHADOW E&M-EST. PATIENT-LVL III: CPT | Mod: PBBFAC,,, | Performed by: PHYSICIAN ASSISTANT

## 2022-03-14 PROCEDURE — 81000 URINALYSIS NONAUTO W/SCOPE: CPT | Mod: PO | Performed by: PHYSICIAN ASSISTANT

## 2022-03-14 PROCEDURE — 99999 PR PBB SHADOW E&M-EST. PATIENT-LVL III: ICD-10-PCS | Mod: PBBFAC,,, | Performed by: PHYSICIAN ASSISTANT

## 2022-03-14 PROCEDURE — 99213 OFFICE O/P EST LOW 20 MIN: CPT | Mod: S$GLB,,, | Performed by: PHYSICIAN ASSISTANT

## 2022-03-14 PROCEDURE — 99213 PR OFFICE/OUTPT VISIT, EST, LEVL III, 20-29 MIN: ICD-10-PCS | Mod: S$GLB,,, | Performed by: PHYSICIAN ASSISTANT

## 2022-03-14 RX ORDER — ERGOCALCIFEROL 1.25 MG/1
CAPSULE ORAL
Qty: 36 CAPSULE | Refills: 3 | Status: CANCELLED | OUTPATIENT
Start: 2022-03-14

## 2022-03-14 RX ORDER — CEPHALEXIN 500 MG/1
500 CAPSULE ORAL EVERY 12 HOURS
Qty: 10 CAPSULE | Refills: 0 | Status: SHIPPED | OUTPATIENT
Start: 2022-03-14 | End: 2022-03-19

## 2022-03-14 NOTE — MEDICAL/APP STUDENT
Subjective:       Patient ID: Sari Biswas is a 81 y.o. female.    Chief Complaint: Urinary Tract Infection (Started Saturday morning, drank water, symptoms dissipated, reappeared both Sunday and Monday morning; frequency, burning)    HPI   Pt PCP is Dr. Anne.  Patient has PMH of HTN, HLD, CAD with CABG, cardiac tamponade, ICD, Afib, DVT, osteopenia.    Pt comes to clinic today with complaints of dysuria, urinary frequency, and odor that began Saturday. Pt notes that symptoms improve throughout the days with increased water intake but have returned each morning. Pt denies fever, nausea, vomiting, diarrhea, hematuria, and flank pain. Pt has not tried anything OTC.     Review of Systems   Constitutional: Negative for appetite change, chills, fatigue and fever.   HENT: Negative for nasal congestion, ear pain, rhinorrhea and sore throat.    Respiratory: Negative for cough, shortness of breath and wheezing.    Cardiovascular: Negative for chest pain and palpitations.   Gastrointestinal: Negative for abdominal pain, constipation, diarrhea, nausea and vomiting.   Genitourinary: Positive for dysuria and frequency. Negative for difficulty urinating, flank pain, hematuria and urgency.   Musculoskeletal: Negative for arthralgias and myalgias.   Integumentary:  Negative for rash.   Neurological: Negative for dizziness, seizures, syncope, weakness, light-headedness and headaches.   Psychiatric/Behavioral: Negative for sleep disturbance.         Objective:      Physical Exam  Vitals and nursing note reviewed.   Constitutional:       General: She is not in acute distress.     Appearance: Normal appearance. She is not ill-appearing, toxic-appearing or diaphoretic.   HENT:      Head: Normocephalic and atraumatic.      Left Ear: Tympanic membrane normal.   Eyes:      General: No scleral icterus.        Right eye: No discharge.         Left eye: No discharge.      Extraocular Movements: Extraocular movements intact.       Conjunctiva/sclera: Conjunctivae normal.   Cardiovascular:      Rate and Rhythm: Normal rate and regular rhythm.      Pulses: Normal pulses.      Heart sounds: Normal heart sounds. No murmur heard.    No friction rub. No gallop.   Pulmonary:      Effort: Pulmonary effort is normal. No respiratory distress.      Breath sounds: Normal breath sounds. No stridor. No wheezing, rhonchi or rales.   Abdominal:      General: Abdomen is flat. Bowel sounds are normal.      Palpations: Abdomen is soft. There is no mass.      Tenderness: There is no abdominal tenderness. There is no right CVA tenderness, left CVA tenderness, guarding or rebound.   Musculoskeletal:         General: No deformity or signs of injury.      Cervical back: Normal range of motion.   Skin:     General: Skin is warm.      Capillary Refill: Capillary refill takes less than 2 seconds.      Coloration: Skin is not jaundiced or pale.      Findings: No lesion or rash.   Neurological:      General: No focal deficit present.      Mental Status: She is alert and oriented to person, place, and time. Mental status is at baseline.   Psychiatric:         Mood and Affect: Mood normal.         Behavior: Behavior normal.         Thought Content: Thought content normal.         Judgment: Judgment normal.         Assessment:       Problem List Items Addressed This Visit    None     Visit Diagnoses     Frequent urination    -  Primary    Relevant Orders    Urinalysis, Reflex to Urine Culture Urine, Clean Catch (Completed)    Acute cystitis with hematuria        Relevant Medications    cephALEXin (KEFLEX) 500 MG capsule          Plan:       Sari was seen today for urinary tract infection.    Diagnoses and all orders for this visit:    Frequent urination  -     Urinalysis, Reflex to Urine Culture Urine, Clean Catch.  -Pt advised to continue to drink plenty of fluids. She could also try AZO OTC to decrease urinary symptoms if needed.     Acute cystitis   -     cephALEXin  (KEFLEX) 500 MG capsule; Take 1 capsule (500 mg total) by mouth every 12 (twelve) hours. for 5 days    Other orders  -     Urinalysis Microscopic        FRITZ FordS

## 2022-03-14 NOTE — PROGRESS NOTES
"Subjective:      Patient ID: Sari Biswas is a 81 y.o. female.    Chief Complaint: Urinary Tract Infection (Started Saturday morning, drank water, symptoms dissipated, reappeared both Sunday and Monday morning; frequency, burning)    HPI   Patient has PMH of HTN, HLD, CAD with CABG, cardiac tamponade, ICD, Afib, DVT, osteopenia.    Pt comes to clinic today with complaints of dysuria, urinary frequency, and odor that began Saturday. Pt notes that symptoms improve throughout the days with increased water intake, but have returned each morning. Pt denies fever, nausea, vomiting, diarrhea, hematuria, and flank pain. Pt has not tried anything OTC.      Review of Systems   Constitutional: Negative for appetite change, chills, fatigue and fever.   HENT: Negative for nasal congestion, ear pain, rhinorrhea and sore throat.    Respiratory: Negative for cough, shortness of breath and wheezing.    Cardiovascular: Negative for chest pain and palpitations.   Gastrointestinal: Negative for abdominal pain, constipation, diarrhea, nausea and vomiting.   Genitourinary: Positive for dysuria and frequency. Negative for difficulty urinating, flank pain, hematuria and urgency.   Musculoskeletal: Negative for arthralgias and myalgias.   Integumentary:  Negative for rash.   Neurological: Negative for dizziness, seizures, syncope, weakness, light-headedness and headaches.   Psychiatric/Behavioral: Negative for sleep disturbance.     Objective:   /74   Pulse 76   Ht 5' 5" (1.651 m)   Wt 76.7 kg (169 lb 1.5 oz)   SpO2 98%   BMI 28.14 kg/m²      Physical Exam   Vitals and nursing note reviewed.   Constitutional:       General: She is not in acute distress.     Appearance: Normal appearance. She is not ill-appearing, toxic-appearing or diaphoretic.   HENT:      Head: Normocephalic and atraumatic.      Left Ear: Tympanic membrane normal.   Eyes:      General: No scleral icterus.        Right eye: No discharge.         Left eye: " No discharge.      Extraocular Movements: Extraocular movements intact.      Conjunctiva/sclera: Conjunctivae normal.   Cardiovascular:      Rate and Rhythm: Normal rate and regular rhythm.      Pulses: Normal pulses.      Heart sounds: Normal heart sounds. No murmur heard.    No friction rub. No gallop.   Pulmonary:      Effort: Pulmonary effort is normal. No respiratory distress.      Breath sounds: Normal breath sounds. No stridor. No wheezing, rhonchi or rales.   Abdominal:      General: Abdomen is flat. Bowel sounds are normal.      Palpations: Abdomen is soft. There is no mass.      Tenderness: There is no abdominal tenderness. There is no right CVA tenderness, left CVA tenderness, guarding or rebound.   Musculoskeletal:         General: No deformity or signs of injury.      Cervical back: Normal range of motion.   Skin:     General: Skin is warm.      Capillary Refill: Capillary refill takes less than 2 seconds.      Coloration: Skin is not jaundiced or pale.      Findings: No lesion or rash.   Neurological:      General: No focal deficit present.      Mental Status: She is alert and oriented to person, place, and time. Mental status is at baseline.   Psychiatric:         Mood and Affect: Mood normal.         Behavior: Behavior normal.         Judgment: Judgment normal.     Assessment:      1. Acute cystitis with hematuria    2. Frequent urination       Plan:   1. Acute cystitis with hematuria  - cephALEXin (KEFLEX) 500 MG capsule; Take 1 capsule (500 mg total) by mouth every 12 (twelve) hours. for 5 days  Dispense: 10 capsule; Refill: 0    2. Frequent urination  - Urinalysis, Reflex to Urine Culture Urine, Clean Catch    Follow up as needed.  Patient agreed with plan and expressed understanding.    Thank you for allowing me to serve you,

## 2022-03-15 RX ORDER — ERGOCALCIFEROL 1.25 MG/1
CAPSULE ORAL
Qty: 36 CAPSULE | Refills: 3 | Status: SHIPPED | OUTPATIENT
Start: 2022-03-15 | End: 2023-05-29 | Stop reason: SDUPTHER

## 2022-03-21 ENCOUNTER — TELEPHONE (OUTPATIENT)
Dept: CARDIOLOGY | Facility: HOSPITAL | Age: 82
End: 2022-03-21
Payer: MEDICARE

## 2022-03-21 NOTE — TELEPHONE ENCOUNTER
Spoke with pt..she is going to be going out of town May 4th and returning May 7th or 8th and will not be bringing her HM

## 2022-04-12 ENCOUNTER — IMMUNIZATION (OUTPATIENT)
Dept: FAMILY MEDICINE | Facility: CLINIC | Age: 82
End: 2022-04-12
Payer: MEDICARE

## 2022-04-12 DIAGNOSIS — Z23 NEED FOR VACCINATION: Primary | ICD-10-CM

## 2022-04-12 PROCEDURE — 0054A COVID-19, MRNA, LNP-S, PF, 30 MCG/0.3 ML DOSE VACCINE (PFIZER): CPT | Mod: PBBFAC | Performed by: FAMILY MEDICINE

## 2022-04-18 DIAGNOSIS — I25.10 CORONARY ARTERY DISEASE INVOLVING NATIVE CORONARY ARTERY OF NATIVE HEART WITHOUT ANGINA PECTORIS: ICD-10-CM

## 2022-04-18 RX ORDER — CLOPIDOGREL BISULFATE 75 MG/1
75 TABLET ORAL DAILY
Qty: 90 TABLET | Refills: 3 | Status: SHIPPED | OUTPATIENT
Start: 2022-04-18 | End: 2023-07-08 | Stop reason: SDUPTHER

## 2022-04-27 ENCOUNTER — CLINICAL SUPPORT (OUTPATIENT)
Dept: CARDIOLOGY | Facility: HOSPITAL | Age: 82
End: 2022-04-27
Attending: INTERNAL MEDICINE
Payer: MEDICARE

## 2022-04-27 ENCOUNTER — OFFICE VISIT (OUTPATIENT)
Dept: CARDIOLOGY | Facility: CLINIC | Age: 82
End: 2022-04-27
Payer: MEDICARE

## 2022-04-27 ENCOUNTER — TELEPHONE (OUTPATIENT)
Dept: CARDIOLOGY | Facility: HOSPITAL | Age: 82
End: 2022-04-27

## 2022-04-27 VITALS
HEIGHT: 65 IN | BODY MASS INDEX: 27.47 KG/M2 | SYSTOLIC BLOOD PRESSURE: 129 MMHG | HEART RATE: 68 BPM | DIASTOLIC BLOOD PRESSURE: 73 MMHG | WEIGHT: 164.88 LBS

## 2022-04-27 DIAGNOSIS — I48.0 PAF (PAROXYSMAL ATRIAL FIBRILLATION): ICD-10-CM

## 2022-04-27 DIAGNOSIS — I50.43 ACUTE ON CHRONIC COMBINED SYSTOLIC (CONGESTIVE) AND DIASTOLIC (CONGESTIVE) HEART FAILURE: ICD-10-CM

## 2022-04-27 DIAGNOSIS — I48.3 TYPICAL ATRIAL FLUTTER: Primary | ICD-10-CM

## 2022-04-27 DIAGNOSIS — Z95.810 CARDIAC DEFIBRILLATOR IN SITU: ICD-10-CM

## 2022-04-27 DIAGNOSIS — I25.10 CORONARY ARTERY DISEASE INVOLVING NATIVE CORONARY ARTERY OF NATIVE HEART WITHOUT ANGINA PECTORIS: ICD-10-CM

## 2022-04-27 DIAGNOSIS — Z95.1 S/P CABG (CORONARY ARTERY BYPASS GRAFT): ICD-10-CM

## 2022-04-27 DIAGNOSIS — Z95.810 ICD (IMPLANTABLE CARDIOVERTER-DEFIBRILLATOR) IN PLACE: ICD-10-CM

## 2022-04-27 PROCEDURE — 93283 PRGRMG EVAL IMPLANTABLE DFB: CPT | Mod: PO

## 2022-04-27 PROCEDURE — 99999 PR PBB SHADOW E&M-EST. PATIENT-LVL III: ICD-10-PCS | Mod: PBBFAC,,, | Performed by: INTERNAL MEDICINE

## 2022-04-27 PROCEDURE — 93283 PRGRMG EVAL IMPLANTABLE DFB: CPT | Mod: 26,,, | Performed by: INTERNAL MEDICINE

## 2022-04-27 PROCEDURE — 93283 CARDIAC DEVICE CHECK - IN CLINIC & HOSPITAL: ICD-10-PCS | Mod: 26,,, | Performed by: INTERNAL MEDICINE

## 2022-04-27 PROCEDURE — 99214 PR OFFICE/OUTPT VISIT, EST, LEVL IV, 30-39 MIN: ICD-10-PCS | Mod: S$GLB,,, | Performed by: INTERNAL MEDICINE

## 2022-04-27 PROCEDURE — 99214 OFFICE O/P EST MOD 30 MIN: CPT | Mod: S$GLB,,, | Performed by: INTERNAL MEDICINE

## 2022-04-27 PROCEDURE — 99999 PR PBB SHADOW E&M-EST. PATIENT-LVL III: CPT | Mod: PBBFAC,,, | Performed by: INTERNAL MEDICINE

## 2022-04-27 NOTE — TELEPHONE ENCOUNTER
"Device Type:  ICD    Date/Time:  4/26/2022 @ 11:46 pm    Event Type: SVT/AFl w/ RVR    Ventricular CL:  288 ms    Duration:  50 secs    Therapy Delivered:  ATP x 6 and shock x 1 30 J    Appropriate Therapy:  No    Successful:  Yes                      Pt Symptomatic:  Yes, pt. reports she was getting her 's medications together and she felt short of breath, left arm numbness and felt like she was going to pass out so she went to bed and that is when ICD fired. She states immediately after she felt fine. She called EMS and "everything checked fine"  BP was 130/80. Pt. Called and scheduled f/u with Dr. Singleton today @ 1:30. Pt. Also scheduled for device interrogation @ 12:30 today. Per Dr. Singleton change detection counter in VT2 zone 20>22 and redetection 14>16 and schedule f/u with Dr. Fitzgerald                  S/p AFl ablation 10/26/2018    Meds:  Toprol XL 25 mg bid      Last EF- 30% 9/3/20    Message sent to [facility] arrhythmia for scheduling with Dr. Fitzgerald                    "

## 2022-04-27 NOTE — PROGRESS NOTES
Subjective:    Patient ID:  Sari Biswas is a 81 y.o. female who presents for follow-up of PAF      HPI  She has been doing fine up until last night that she started having some palpitations and shortness of breath as well as chest pain and then she had defibrillator discharge.  After this, her symptoms subsided  She comes with no complaints, no chest pain, no shortness of breath  Looking at the records, Dr. Lopez did AFib ablation back in 2018. In 2019 she was still having episodes of atrial flutter for which he recommended sotalol.  She did not want to take that and we try amiodarone instead, which she did not tolerate    Review of Systems   Constitutional: Negative for decreased appetite, malaise/fatigue, weight gain and weight loss.   Cardiovascular: Negative for chest pain, dyspnea on exertion, leg swelling, palpitations and syncope.   Respiratory: Negative for cough and shortness of breath.    Gastrointestinal: Negative.    Neurological: Negative for weakness.   All other systems reviewed and are negative.       Objective:      Physical Exam  Vitals and nursing note reviewed.   Constitutional:       Appearance: Normal appearance. She is well-developed.   HENT:      Head: Normocephalic.   Eyes:      Pupils: Pupils are equal, round, and reactive to light.   Neck:      Thyroid: No thyromegaly.      Vascular: No carotid bruit or JVD.   Cardiovascular:      Rate and Rhythm: Normal rate and regular rhythm.      Chest Wall: PMI is not displaced.      Pulses: Normal pulses and intact distal pulses.      Heart sounds: Normal heart sounds. No murmur heard.    No gallop.   Pulmonary:      Effort: Pulmonary effort is normal.      Breath sounds: Normal breath sounds.   Abdominal:      Palpations: Abdomen is soft. There is no mass.      Tenderness: There is no abdominal tenderness.   Musculoskeletal:         General: Normal range of motion.      Cervical back: Normal range of motion and neck supple.   Skin:      General: Skin is warm.   Neurological:      Mental Status: She is alert and oriented to person, place, and time.      Sensory: No sensory deficit.      Deep Tendon Reflexes: Reflexes are normal and symmetric.     AICD interrogation reveals that she had probably like 45 minutes of atrial flutter with ATP x3 times, successful and then ICD discharge with termination of the atrial flutter      Assessment:       1. Typical atrial flutter    2. S/P CABG (coronary artery bypass graft)    3. PAF (paroxysmal atrial fibrillation)    4. ICD (implantable cardioverter-defibrillator) in place    5. Coronary artery disease involving native coronary artery of native heart without angina pectoris    6. Acute on chronic combined systolic (congestive) and diastolic (congestive) heart failure         Plan:   Referred to Dr. Henley  Xarelto 20 mg p.o. q.d..  Stop Plavix  Continue all other cardiac medications  Regular exercise program    Keep appointment in June

## 2022-05-09 ENCOUNTER — PATIENT MESSAGE (OUTPATIENT)
Dept: SMOKING CESSATION | Facility: CLINIC | Age: 82
End: 2022-05-09
Payer: MEDICARE

## 2022-05-13 ENCOUNTER — PES CALL (OUTPATIENT)
Dept: ADMINISTRATIVE | Facility: CLINIC | Age: 82
End: 2022-05-13
Payer: MEDICARE

## 2022-05-14 ENCOUNTER — CLINICAL SUPPORT (OUTPATIENT)
Dept: CARDIOLOGY | Facility: HOSPITAL | Age: 82
End: 2022-05-14
Payer: MEDICARE

## 2022-05-14 DIAGNOSIS — Z95.810 PRESENCE OF AUTOMATIC (IMPLANTABLE) CARDIAC DEFIBRILLATOR: ICD-10-CM

## 2022-05-14 PROCEDURE — 93296 REM INTERROG EVL PM/IDS: CPT | Mod: PO | Performed by: INTERNAL MEDICINE

## 2022-05-14 PROCEDURE — 93295 CARDIAC DEVICE CHECK - REMOTE: ICD-10-PCS | Mod: ,,, | Performed by: INTERNAL MEDICINE

## 2022-05-14 PROCEDURE — 93295 DEV INTERROG REMOTE 1/2/MLT: CPT | Mod: ,,, | Performed by: INTERNAL MEDICINE

## 2022-05-17 ENCOUNTER — OFFICE VISIT (OUTPATIENT)
Dept: OPHTHALMOLOGY | Facility: CLINIC | Age: 82
End: 2022-05-17
Payer: MEDICARE

## 2022-05-17 DIAGNOSIS — H35.3133 ADVANCED ATROPHIC NONEXUDATIVE AGE-RELATED MACULAR DEGENERATION OF BOTH EYES WITHOUT SUBFOVEAL INVOLVEMENT: Primary | ICD-10-CM

## 2022-05-17 DIAGNOSIS — Z96.1 PSEUDOPHAKIA OF BOTH EYES: ICD-10-CM

## 2022-05-17 PROCEDURE — 92004 COMPRE OPH EXAM NEW PT 1/>: CPT | Mod: S$GLB,,, | Performed by: OPHTHALMOLOGY

## 2022-05-17 PROCEDURE — 92134 CPTRZ OPH DX IMG PST SGM RTA: CPT | Mod: S$GLB,,, | Performed by: OPHTHALMOLOGY

## 2022-05-17 PROCEDURE — 92134 POSTERIOR SEGMENT OCT RETINA (OCULAR COHERENCE TOMOGRAPHY)-BOTH EYES: ICD-10-PCS | Mod: S$GLB,,, | Performed by: OPHTHALMOLOGY

## 2022-05-17 PROCEDURE — 92004 PR EYE EXAM, NEW PATIENT,COMPREHESV: ICD-10-PCS | Mod: S$GLB,,, | Performed by: OPHTHALMOLOGY

## 2022-05-17 PROCEDURE — 99999 PR PBB SHADOW E&M-EST. PATIENT-LVL III: CPT | Mod: PBBFAC,,, | Performed by: OPHTHALMOLOGY

## 2022-05-17 PROCEDURE — 99999 PR PBB SHADOW E&M-EST. PATIENT-LVL III: ICD-10-PCS | Mod: PBBFAC,,, | Performed by: OPHTHALMOLOGY

## 2022-05-17 NOTE — PROGRESS NOTES
HPI     New pt here for AMD DLS - 6 months ago by Dr. Keller.     Pt states her VA is stable, gets a check up every 6 months for her AMD it   has been stale.   HTN is stable on meds. Using otc gtts as needed.   Denies floaters and FOL.       Last edited by Jennifer Jones MA on 5/17/2022  1:12 PM. (History)        ROS     Negative for: Constitutional, Gastrointestinal, Neurological, Skin,   Genitourinary, Musculoskeletal, HENT, Endocrine, Cardiovascular, Eyes,   Respiratory, Psychiatric, Allergic/Imm, Heme/Lymph    Last edited by Tereso Franklin Jr., MD on 5/17/2022  1:51 PM. (History)        Assessment /Plan     For exam results, see Encounter Report.    Advanced atrophic nonexudative age-related macular degeneration of both eyes without subfoveal involvement    Pseudophakia of both eyes      -Check your Amsler Grid daily, each eye separately; instructions are available on the grid  -Return promptly if a change is noted such as lines that are not visible or looking wavy on the grid  -It is also recommended that you take eye vitamin supplements.  These are available over-the-counter. I recommend I-Caps AREDS2 Formula or Preservision AREDS2 Formula. Should be taken 1 tab po BID  -stable, observe  -med recs from previous ophthalmologist  -F/U in 6 months

## 2022-05-26 ENCOUNTER — OFFICE VISIT (OUTPATIENT)
Dept: FAMILY MEDICINE | Facility: CLINIC | Age: 82
End: 2022-05-26
Payer: MEDICARE

## 2022-05-26 VITALS
BODY MASS INDEX: 27.22 KG/M2 | WEIGHT: 163.56 LBS | TEMPERATURE: 99 F | OXYGEN SATURATION: 98 % | DIASTOLIC BLOOD PRESSURE: 60 MMHG | SYSTOLIC BLOOD PRESSURE: 112 MMHG | HEART RATE: 71 BPM

## 2022-05-26 DIAGNOSIS — I48.20 CHRONIC ATRIAL FIBRILLATION: ICD-10-CM

## 2022-05-26 DIAGNOSIS — Z12.31 SCREENING MAMMOGRAM FOR HIGH-RISK PATIENT: ICD-10-CM

## 2022-05-26 DIAGNOSIS — Z78.0 MENOPAUSE: ICD-10-CM

## 2022-05-26 DIAGNOSIS — I10 ESSENTIAL HYPERTENSION: Primary | ICD-10-CM

## 2022-05-26 DIAGNOSIS — M54.2 CERVICALGIA: ICD-10-CM

## 2022-05-26 PROCEDURE — 99214 OFFICE O/P EST MOD 30 MIN: CPT | Mod: S$GLB,,, | Performed by: FAMILY MEDICINE

## 2022-05-26 PROCEDURE — 99214 PR OFFICE/OUTPT VISIT, EST, LEVL IV, 30-39 MIN: ICD-10-PCS | Mod: S$GLB,,, | Performed by: FAMILY MEDICINE

## 2022-05-26 PROCEDURE — 99999 PR PBB SHADOW E&M-EST. PATIENT-LVL IV: ICD-10-PCS | Mod: PBBFAC,,, | Performed by: FAMILY MEDICINE

## 2022-05-26 PROCEDURE — 99999 PR PBB SHADOW E&M-EST. PATIENT-LVL IV: CPT | Mod: PBBFAC,,, | Performed by: FAMILY MEDICINE

## 2022-05-26 RX ORDER — CARBOXYMETHYLCELLULOSE SODIUM 5 MG/ML
1 SOLUTION/ DROPS OPHTHALMIC 3 TIMES DAILY PRN
COMMUNITY

## 2022-05-26 NOTE — PROGRESS NOTES
Subjective:       Patient ID: Sari Biswas is a 82 y.o. female.    Chief Complaint: Hyperlipidemia    HPI     Here for a f/u     paf stable. sees cardio     hld stable.      On vit d2 for vitamin d deficiency    C/o left sided neck discomfort x 2 months. Mild. Waxes and wanes. Worse with movement.       Review of Systems      Review of Systems   Constitutional: Negative for fever and chills.   HENT: Negative for hearing loss and neck stiffness.    Eyes: Negative for redness and itching.   Respiratory: Negative for cough and choking.    Cardiovascular: Negative for chest pain and leg swelling.  Abdomen: Negative for abdominal pain and blood in stool.   Genitourinary: Negative for dysuria and flank pain.   Musculoskeletal: Negative for back pain and gait problem.   Neurological: Negative for light-headedness and headaches.   Hematological: Negative for adenopathy.   Psychiatric/Behavioral: Negative for behavioral problems.     Objective:      Physical Exam  Constitutional:       General: She is not in acute distress.     Appearance: She is well-developed.   HENT:      Head: Normocephalic and atraumatic.   Eyes:      Conjunctiva/sclera: Conjunctivae normal.      Pupils: Pupils are equal, round, and reactive to light.   Neck:      Thyroid: No thyromegaly.   Cardiovascular:      Rate and Rhythm: Normal rate and regular rhythm.      Heart sounds: Normal heart sounds. No murmur heard.    No friction rub.   Pulmonary:      Effort: Pulmonary effort is normal.      Breath sounds: Normal breath sounds. No wheezing or rales.   Abdominal:      General: Bowel sounds are normal. There is no distension.      Palpations: Abdomen is soft. There is no mass.      Tenderness: There is no abdominal tenderness.   Musculoskeletal:      Cervical back: Normal range of motion and neck supple.      Comments: Cervical spine: tend of left paravert area.  Dec rom with flex/ext.     Lymphadenopathy:      Cervical: No cervical adenopathy.    Skin:     General: Skin is warm.      Findings: No erythema or rash.   Neurological:      Mental Status: She is alert and oriented to person, place, and time.      Cranial Nerves: No cranial nerve deficit.      Deep Tendon Reflexes: Reflexes are normal and symmetric.   Psychiatric:         Thought Content: Thought content normal.         Assessment:       1. Essential hypertension    2. Chronic atrial fibrillation    3. Screening mammogram for high-risk patient    4. Menopause    5. Cervicalgia        Plan:       Essential hypertension    Chronic atrial fibrillation    Screening mammogram for high-risk patient  -     Mammo Digital Screening Bilat; Future    Menopause  -     DXA Bone Density Spine And Hip; Future    Cervicalgia              Plan:  Recommend heating pad for cervicalgia  See orders  Cont current meds      Medication List with Changes/Refills   Current Medications    BIOTIN ORAL    Take 1 capsule by mouth once daily.     CARBOXYMETHYLCELLULOSE (REFRESH PLUS) 0.5 % DPET    1 drop 3 (three) times daily as needed.    CLOBETASOL (TEMOVATE) 0.05 % CREAM    Apply topically 2 (two) times daily.    CLOPIDOGREL (PLAVIX) 75 MG TABLET    Take 1 tablet (75 mg total) by mouth once daily.    CRANBERRY 400 MG CAP    Take 1 capsule by mouth once daily.     FUROSEMIDE (LASIX) 20 MG TABLET    Take 1 tablet (20 mg total) by mouth once daily.    HYDROCORTISONE 2.5 % CREAM    APPLY EXTERNALLY TO THE AFFECTED AREA TWICE DAILY FOR 1 WEEK THEN AS NEEDED FOR FLARE UPS    KETOCONAZOLE (NIZORAL) 2 % CREAM    APPLY EXTERNALLY TO RASH ONCE TO TWICE DAILY AS DIRECTED    METOPROLOL SUCCINATE (TOPROL-XL) 25 MG 24 HR TABLET    Take 1 tablet (25 mg total) by mouth 2 (two) times daily.    RESTASIS 0.05 % OPHTHALMIC EMULSION    Place 1 drop into both eyes 2 (two) times daily.    RIVAROXABAN (XARELTO) 20 MG TAB    Take 1 tablet (20 mg total) by mouth daily with dinner or evening meal.    ROSUVASTATIN (CRESTOR) 5 MG TABLET    Take 1  tablet (5 mg total) by mouth every evening.    SACUBITRIL-VALSARTAN (ENTRESTO) 24-26 MG PER TABLET    Take 1 tablet by mouth 2 (two) times daily.    SPIRONOLACTONE (ALDACTONE) 25 MG TABLET    Take 0.5 tablets (12.5 mg total) by mouth once daily.    VIT C,X-BP-RCRDU-LUTEIN-ZEAXAN (PRESERVISION AREDS 2) 250-90-40-1 MG CAP    Take by mouth 2 (two) times daily.    VITAMIN D2 1,250 MCG (50,000 UNIT) CAPSULE    take one capsule by mouth three times a week on Monday, Wednesday and Friday

## 2022-06-01 ENCOUNTER — CLINICAL SUPPORT (OUTPATIENT)
Dept: CARDIOLOGY | Facility: HOSPITAL | Age: 82
End: 2022-06-01
Attending: INTERNAL MEDICINE
Payer: MEDICARE

## 2022-06-01 ENCOUNTER — LAB VISIT (OUTPATIENT)
Dept: LAB | Facility: HOSPITAL | Age: 82
End: 2022-06-01
Attending: INTERNAL MEDICINE
Payer: MEDICARE

## 2022-06-01 VITALS — HEIGHT: 65 IN | WEIGHT: 163 LBS | BODY MASS INDEX: 27.16 KG/M2

## 2022-06-01 DIAGNOSIS — I25.5 ISCHEMIC DILATED CARDIOMYOPATHY: ICD-10-CM

## 2022-06-01 DIAGNOSIS — I25.10 CORONARY ARTERY DISEASE INVOLVING NATIVE CORONARY ARTERY OF NATIVE HEART WITHOUT ANGINA PECTORIS: ICD-10-CM

## 2022-06-01 DIAGNOSIS — I10 PRIMARY HYPERTENSION: ICD-10-CM

## 2022-06-01 DIAGNOSIS — I42.0 ISCHEMIC DILATED CARDIOMYOPATHY: ICD-10-CM

## 2022-06-01 DIAGNOSIS — I48.0 PAROXYSMAL ATRIAL FIBRILLATION: ICD-10-CM

## 2022-06-01 DIAGNOSIS — Z95.1 S/P CABG (CORONARY ARTERY BYPASS GRAFT): ICD-10-CM

## 2022-06-01 LAB
ALBUMIN SERPL BCP-MCNC: 3.7 G/DL (ref 3.5–5.2)
ALP SERPL-CCNC: 79 U/L (ref 55–135)
ALT SERPL W/O P-5'-P-CCNC: 12 U/L (ref 10–44)
ANION GAP SERPL CALC-SCNC: 6 MMOL/L (ref 8–16)
ASCENDING AORTA: 2.79 CM
AST SERPL-CCNC: 19 U/L (ref 10–40)
AV INDEX (PROSTH): 0.71
AV MEAN GRADIENT: 3 MMHG
AV PEAK GRADIENT: 6 MMHG
AV VALVE AREA: 2.4 CM2
AV VELOCITY RATIO: 0.76
BILIRUB DIRECT SERPL-MCNC: 0.3 MG/DL (ref 0.1–0.3)
BILIRUB SERPL-MCNC: 0.7 MG/DL (ref 0.1–1)
BNP SERPL-MCNC: 236 PG/ML (ref 0–99)
BSA FOR ECHO PROCEDURE: 1.84 M2
BUN SERPL-MCNC: 31 MG/DL (ref 8–23)
CALCIUM SERPL-MCNC: 9.2 MG/DL (ref 8.7–10.5)
CHLORIDE SERPL-SCNC: 107 MMOL/L (ref 95–110)
CHOLEST SERPL-MCNC: 138 MG/DL (ref 120–199)
CHOLEST/HDLC SERPL: 2.6 {RATIO} (ref 2–5)
CO2 SERPL-SCNC: 25 MMOL/L (ref 23–29)
CREAT SERPL-MCNC: 1.2 MG/DL (ref 0.5–1.4)
CV ECHO LV RWT: 0.33 CM
DOP CALC AO PEAK VEL: 1.25 M/S
DOP CALC AO VTI: 29.86 CM
DOP CALC LVOT AREA: 3.4 CM2
DOP CALC LVOT DIAMETER: 2.07 CM
DOP CALC LVOT PEAK VEL: 0.95 M/S
DOP CALC LVOT STROKE VOLUME: 71.58 CM3
DOP CALC MV VTI: 34.69 CM
DOP CALCLVOT PEAK VEL VTI: 21.28 CM
E WAVE DECELERATION TIME: 147.95 MSEC
E/A RATIO: 0.87
E/E' RATIO: 15.64 M/S
ECHO LV POSTERIOR WALL: 0.9 CM (ref 0.6–1.1)
EJECTION FRACTION: 40 %
EST. GFR  (AFRICAN AMERICAN): 48.3 ML/MIN/1.73 M^2
EST. GFR  (NON AFRICAN AMERICAN): 41.9 ML/MIN/1.73 M^2
FRACTIONAL SHORTENING: 11 % (ref 28–44)
GLUCOSE SERPL-MCNC: 88 MG/DL (ref 70–110)
HDLC SERPL-MCNC: 53 MG/DL (ref 40–75)
HDLC SERPL: 38.4 % (ref 20–50)
INTERVENTRICULAR SEPTUM: 0.88 CM (ref 0.6–1.1)
LA MAJOR: 5.04 CM
LA MINOR: 5.12 CM
LA WIDTH: 4.89 CM
LDLC SERPL CALC-MCNC: 71.6 MG/DL (ref 63–159)
LEFT ATRIUM SIZE: 4.17 CM
LEFT ATRIUM VOLUME INDEX: 48.6 ML/M2
LEFT ATRIUM VOLUME: 88.04 CM3
LEFT INTERNAL DIMENSION IN SYSTOLE: 4.87 CM (ref 2.1–4)
LEFT VENTRICLE DIASTOLIC VOLUME INDEX: 80.35 ML/M2
LEFT VENTRICLE DIASTOLIC VOLUME: 145.43 ML
LEFT VENTRICLE MASS INDEX: 100 G/M2
LEFT VENTRICLE SYSTOLIC VOLUME INDEX: 61.4 ML/M2
LEFT VENTRICLE SYSTOLIC VOLUME: 111.13 ML
LEFT VENTRICULAR INTERNAL DIMENSION IN DIASTOLE: 5.47 CM (ref 3.5–6)
LEFT VENTRICULAR MASS: 181.49 G
LV LATERAL E/E' RATIO: 12.29 M/S
LV SEPTAL E/E' RATIO: 21.5 M/S
MV A" WAVE DURATION": 13.89 MSEC
MV MEAN GRADIENT: 1 MMHG
MV PEAK A VEL: 0.99 M/S
MV PEAK E VEL: 0.86 M/S
MV PEAK GRADIENT: 4 MMHG
MV STENOSIS PRESSURE HALF TIME: 42.9 MS
MV VALVE AREA BY CONTINUITY EQUATION: 2.06 CM2
MV VALVE AREA P 1/2 METHOD: 5.13 CM2
NONHDLC SERPL-MCNC: 85 MG/DL
PISA MRMAX VEL: 0.05 M/S
PISA TR MAX VEL: 3.02 M/S
POTASSIUM SERPL-SCNC: 4.7 MMOL/L (ref 3.5–5.1)
PROT SERPL-MCNC: 6.9 G/DL (ref 6–8.4)
PULM VEIN S/D RATIO: 1.49
PV PEAK D VEL: 0.37 M/S
PV PEAK S VEL: 0.55 M/S
RA MAJOR: 5.16 CM
RA PRESSURE: 3 MMHG
RA WIDTH: 4.02 CM
RIGHT VENTRICULAR END-DIASTOLIC DIMENSION: 3.77 CM
RV TISSUE DOPPLER FREE WALL SYSTOLIC VELOCITY 1 (APICAL 4 CHAMBER VIEW): 8.74 CM/S
SINUS: 2.88 CM
SODIUM SERPL-SCNC: 138 MMOL/L (ref 136–145)
STJ: 2.33 CM
TDI LATERAL: 0.07 M/S
TDI SEPTAL: 0.04 M/S
TDI: 0.06 M/S
TR MAX PG: 36 MMHG
TRICUSPID ANNULAR PLANE SYSTOLIC EXCURSION: 1.4 CM
TRIGL SERPL-MCNC: 67 MG/DL (ref 30–150)
TSH SERPL DL<=0.005 MIU/L-ACNC: 2.23 UIU/ML (ref 0.4–4)
TV REST PULMONARY ARTERY PRESSURE: 39 MMHG

## 2022-06-01 PROCEDURE — 93306 TTE W/DOPPLER COMPLETE: CPT | Mod: 26,,, | Performed by: INTERNAL MEDICINE

## 2022-06-01 PROCEDURE — C8929 TTE W OR WO FOL WCON,DOPPLER: HCPCS | Mod: PO

## 2022-06-01 PROCEDURE — 83880 ASSAY OF NATRIURETIC PEPTIDE: CPT | Performed by: INTERNAL MEDICINE

## 2022-06-01 PROCEDURE — 36415 COLL VENOUS BLD VENIPUNCTURE: CPT | Mod: PO | Performed by: INTERNAL MEDICINE

## 2022-06-01 PROCEDURE — 80061 LIPID PANEL: CPT | Performed by: INTERNAL MEDICINE

## 2022-06-01 PROCEDURE — 25500020 PHARM REV CODE 255: Mod: PO | Performed by: INTERNAL MEDICINE

## 2022-06-01 PROCEDURE — 80048 BASIC METABOLIC PNL TOTAL CA: CPT | Performed by: INTERNAL MEDICINE

## 2022-06-01 PROCEDURE — 84443 ASSAY THYROID STIM HORMONE: CPT | Performed by: INTERNAL MEDICINE

## 2022-06-01 PROCEDURE — 80076 HEPATIC FUNCTION PANEL: CPT | Performed by: INTERNAL MEDICINE

## 2022-06-01 PROCEDURE — 93306 ECHO (CUPID ONLY): ICD-10-PCS | Mod: 26,,, | Performed by: INTERNAL MEDICINE

## 2022-06-01 RX ADMIN — HUMAN ALBUMIN MICROSPHERES AND PERFLUTREN 0.11 MG: 10; .22 INJECTION, SOLUTION INTRAVENOUS at 10:06

## 2022-06-02 DIAGNOSIS — I49.8 OTHER SPECIFIED CARDIAC ARRHYTHMIAS: Primary | ICD-10-CM

## 2022-06-03 PROBLEM — I48.0 PAF (PAROXYSMAL ATRIAL FIBRILLATION): Status: RESOLVED | Noted: 2018-10-26 | Resolved: 2022-06-03

## 2022-06-03 NOTE — PROGRESS NOTES
Subjective:    Patient ID:  Sari Biswas is a 82 y.o. female who presents for evaluation of Atrial Fibrillation      HPI 81 yo female with atrial fibrillation, atrial flutter, CAD s/p CABG, ischemic cardiomyopathy, CHF, VT, ICD.    Background:  Primary cardiologist at this time is Dr. Singleton.  Underwent high risk PCI by Dr. Colon 7/11/18 complicated by LAD perforation and tamponade.  7/11/18 Emergent CABG x 3 + sewing of LAD.  Subsequently had cardiogenic shock with multiple pressors.  Was in atrial fibrillation and atrial flutter during that time.  Placed on amiodarone.    EF was 35% 7/17.  Placed on Lifevest.  Seen by Dr. Singleton.  Noted to be in atrial flutter with cycle length of 270 msec, c/w isthmus dependent atrial flutter.  DCCV 10/8/18.   Echo 10/0818 EF 25%  Notes indicate SVT prior to her surgery.  She reports she had intermittent palpitations that resolved.  Of note, had right femoral hematoma requiring wound care.     RFA 10/26/18 RFA for atrial flutter.  She was in atrial flutter at time of the procedure.  Typical flutter confirmed.  Amiodarone discontinued after procedure.  Echo 12/14/18 EF 25%     Dual chamber ICD implanted 1/10/19 (Dr. Singleton).     Had asymptomatic VT, treated with ATP, and inappropriate therapy for Atrial tachycardia/flutter.    She noted hair loss while on amiodarone.  This has persisted despite discontinuing amiodarone in October.  Recommended initiation of Sotalol. She ultimately deferred.  Amiodarone again recommended. She deferred.    Echo 6/1/22 EF 40% severe LAE    Update:    Device interrogation reveals no recurrence of arrhythmias until 4/26/22. Received an inappropriate therapy (ICD shock) for atrial flutter/tachycardia with 1:1 conduction. This occurred in the setting of significant stress (she is now the caretaker for her  and that was a tough day). Noted shortness of breath and felt poorly prior to shock.  Feeling well overall since  then.        Review of Systems   Constitutional: Negative. Negative for fever and malaise/fatigue.   HENT: Negative for congestion and sore throat.    Cardiovascular: Negative for chest pain, dyspnea on exertion, irregular heartbeat, leg swelling, near-syncope, orthopnea, palpitations, paroxysmal nocturnal dyspnea and syncope.   Respiratory: Negative for cough and shortness of breath.    Gastrointestinal: Negative for abdominal pain, constipation and diarrhea.   Neurological: Negative for dizziness, light-headedness and weakness.   Psychiatric/Behavioral: Negative for depression. The patient is not nervous/anxious.    All other systems reviewed and are negative.       Objective:    Physical Exam  Constitutional:       Appearance: She is well-developed.   Eyes:      General: No scleral icterus.     Conjunctiva/sclera: Conjunctivae normal.   Neck:      Vascular: No JVD.      Trachea: No tracheal deviation.   Cardiovascular:      Rate and Rhythm: Normal rate and regular rhythm.      Chest Wall: PMI is not displaced.   Pulmonary:      Effort: Pulmonary effort is normal. No respiratory distress.      Breath sounds: Normal breath sounds.   Abdominal:      Palpations: Abdomen is soft.      Tenderness: There is no abdominal tenderness.   Musculoskeletal:         General: No tenderness.   Skin:     General: Skin is warm and dry.      Findings: No rash.   Neurological:      Mental Status: She is alert and oriented to person, place, and time.   Psychiatric:         Behavior: Behavior normal.           Assessment:       1. Paroxysmal atrial fibrillation    2. Typical atrial flutter    3. Ischemic dilated cardiomyopathy    4. Ventricular tachycardia    5. ICD (implantable cardioverter-defibrillator) in place    6. Coronary artery disease involving native coronary artery of native heart without angina pectoris    7. S/P CABG (coronary artery bypass graft)    8. Atherosclerosis of aorta         Plan:       Recurrent atrial  flutter/tachycardia, suspect left sided AT.  Given paucity of arrhythmias and need for induction and mapping, I do not believe RFA is appropriate at this time.  Recommend admission for Sotalol loading. We again discussed the rationale for this. She will consider.

## 2022-06-06 ENCOUNTER — OFFICE VISIT (OUTPATIENT)
Dept: CARDIOLOGY | Facility: CLINIC | Age: 82
End: 2022-06-06
Payer: MEDICARE

## 2022-06-06 VITALS
HEART RATE: 97 BPM | HEIGHT: 65 IN | BODY MASS INDEX: 27.85 KG/M2 | DIASTOLIC BLOOD PRESSURE: 60 MMHG | WEIGHT: 167.13 LBS | SYSTOLIC BLOOD PRESSURE: 97 MMHG

## 2022-06-06 DIAGNOSIS — I48.3 TYPICAL ATRIAL FLUTTER: ICD-10-CM

## 2022-06-06 DIAGNOSIS — Z95.1 S/P CABG (CORONARY ARTERY BYPASS GRAFT): ICD-10-CM

## 2022-06-06 DIAGNOSIS — I25.5 ISCHEMIC DILATED CARDIOMYOPATHY: ICD-10-CM

## 2022-06-06 DIAGNOSIS — Z95.810 ICD (IMPLANTABLE CARDIOVERTER-DEFIBRILLATOR) IN PLACE: ICD-10-CM

## 2022-06-06 DIAGNOSIS — I48.0 PAROXYSMAL ATRIAL FIBRILLATION: Primary | ICD-10-CM

## 2022-06-06 DIAGNOSIS — I25.10 CORONARY ARTERY DISEASE INVOLVING NATIVE CORONARY ARTERY OF NATIVE HEART WITHOUT ANGINA PECTORIS: ICD-10-CM

## 2022-06-06 DIAGNOSIS — I70.0 ATHEROSCLEROSIS OF AORTA: ICD-10-CM

## 2022-06-06 DIAGNOSIS — I47.20 VENTRICULAR TACHYCARDIA: ICD-10-CM

## 2022-06-06 DIAGNOSIS — I42.0 ISCHEMIC DILATED CARDIOMYOPATHY: ICD-10-CM

## 2022-06-06 PROCEDURE — 99999 PR PBB SHADOW E&M-EST. PATIENT-LVL III: ICD-10-PCS | Mod: PBBFAC,,, | Performed by: INTERNAL MEDICINE

## 2022-06-06 PROCEDURE — 99205 OFFICE O/P NEW HI 60 MIN: CPT | Mod: S$GLB,,, | Performed by: INTERNAL MEDICINE

## 2022-06-06 PROCEDURE — 99999 PR PBB SHADOW E&M-EST. PATIENT-LVL III: CPT | Mod: PBBFAC,,, | Performed by: INTERNAL MEDICINE

## 2022-06-06 PROCEDURE — 99205 PR OFFICE/OUTPT VISIT, NEW, LEVL V, 60-74 MIN: ICD-10-PCS | Mod: S$GLB,,, | Performed by: INTERNAL MEDICINE

## 2022-06-08 ENCOUNTER — PES CALL (OUTPATIENT)
Dept: ADMINISTRATIVE | Facility: CLINIC | Age: 82
End: 2022-06-08
Payer: MEDICARE

## 2022-06-16 ENCOUNTER — OFFICE VISIT (OUTPATIENT)
Dept: CARDIOLOGY | Facility: CLINIC | Age: 82
End: 2022-06-16
Payer: MEDICARE

## 2022-06-16 ENCOUNTER — HOSPITAL ENCOUNTER (OUTPATIENT)
Dept: RADIOLOGY | Facility: HOSPITAL | Age: 82
Discharge: HOME | End: 2022-06-16
Attending: FAMILY MEDICINE
Payer: MEDICARE

## 2022-06-16 VITALS
BODY MASS INDEX: 27.58 KG/M2 | SYSTOLIC BLOOD PRESSURE: 122 MMHG | HEART RATE: 98 BPM | WEIGHT: 165.56 LBS | DIASTOLIC BLOOD PRESSURE: 70 MMHG | HEIGHT: 65 IN

## 2022-06-16 DIAGNOSIS — M79.89 RIGHT LEG SWELLING: Primary | ICD-10-CM

## 2022-06-16 DIAGNOSIS — I48.0 PAROXYSMAL ATRIAL FIBRILLATION: ICD-10-CM

## 2022-06-16 DIAGNOSIS — I25.10 CORONARY ARTERY DISEASE INVOLVING NATIVE CORONARY ARTERY OF NATIVE HEART WITHOUT ANGINA PECTORIS: ICD-10-CM

## 2022-06-16 DIAGNOSIS — Z95.1 S/P CABG (CORONARY ARTERY BYPASS GRAFT): ICD-10-CM

## 2022-06-16 DIAGNOSIS — Z95.810 ICD (IMPLANTABLE CARDIOVERTER-DEFIBRILLATOR) IN PLACE: ICD-10-CM

## 2022-06-16 DIAGNOSIS — Z78.0 MENOPAUSE: ICD-10-CM

## 2022-06-16 DIAGNOSIS — I48.3 TYPICAL ATRIAL FLUTTER: ICD-10-CM

## 2022-06-16 DIAGNOSIS — Z12.31 SCREENING MAMMOGRAM FOR HIGH-RISK PATIENT: ICD-10-CM

## 2022-06-16 PROCEDURE — 77067 MAMMO DIGITAL SCREENING BILAT WITH TOMO: ICD-10-PCS | Mod: 26,,, | Performed by: RADIOLOGY

## 2022-06-16 PROCEDURE — 77067 SCR MAMMO BI INCL CAD: CPT | Mod: TC,PO

## 2022-06-16 PROCEDURE — 77067 SCR MAMMO BI INCL CAD: CPT | Mod: 26,,, | Performed by: RADIOLOGY

## 2022-06-16 PROCEDURE — 99999 PR PBB SHADOW E&M-EST. PATIENT-LVL III: ICD-10-PCS | Mod: PBBFAC,,, | Performed by: INTERNAL MEDICINE

## 2022-06-16 PROCEDURE — 77063 BREAST TOMOSYNTHESIS BI: CPT | Mod: 26,,, | Performed by: RADIOLOGY

## 2022-06-16 PROCEDURE — 99999 PR PBB SHADOW E&M-EST. PATIENT-LVL III: CPT | Mod: PBBFAC,,, | Performed by: INTERNAL MEDICINE

## 2022-06-16 PROCEDURE — 77080 DXA BONE DENSITY AXIAL: CPT | Mod: TC,PO

## 2022-06-16 PROCEDURE — 77080 DXA BONE DENSITY AXIAL: CPT | Mod: 26,,, | Performed by: RADIOLOGY

## 2022-06-16 PROCEDURE — 77063 MAMMO DIGITAL SCREENING BILAT WITH TOMO: ICD-10-PCS | Mod: 26,,, | Performed by: RADIOLOGY

## 2022-06-16 PROCEDURE — 99214 OFFICE O/P EST MOD 30 MIN: CPT | Mod: S$GLB,,, | Performed by: INTERNAL MEDICINE

## 2022-06-16 PROCEDURE — 77080 DEXA BONE DENSITY SPINE HIP: ICD-10-PCS | Mod: 26,,, | Performed by: RADIOLOGY

## 2022-06-16 PROCEDURE — 99214 PR OFFICE/OUTPT VISIT, EST, LEVL IV, 30-39 MIN: ICD-10-PCS | Mod: S$GLB,,, | Performed by: INTERNAL MEDICINE

## 2022-06-16 NOTE — PROGRESS NOTES
Subjective:    Patient ID:  Sari Biswas is a 82 y.o. female who presents for follow-up of ICM, atrial flutter    HPI  She comes with no complaints, no chest pain, no shortness of breath  She does report occasional swelling of the right lower extremity  She was seen by Dr. Henley last month who recommended sotalol to prevent recurrence of her atrial fibrillation    Review of Systems   Constitutional: Negative for decreased appetite, malaise/fatigue, weight gain and weight loss.   Cardiovascular: Negative for chest pain, dyspnea on exertion, leg swelling, palpitations and syncope.   Respiratory: Negative for cough and shortness of breath.    Gastrointestinal: Negative.    Neurological: Negative for weakness.   All other systems reviewed and are negative.       Objective:      Physical Exam  Vitals and nursing note reviewed.   Constitutional:       Appearance: Normal appearance. She is well-developed.   HENT:      Head: Normocephalic.   Eyes:      Pupils: Pupils are equal, round, and reactive to light.   Neck:      Thyroid: No thyromegaly.      Vascular: No carotid bruit or JVD.   Cardiovascular:      Rate and Rhythm: Normal rate and regular rhythm.      Chest Wall: PMI is not displaced.      Pulses: Normal pulses and intact distal pulses.      Heart sounds: Normal heart sounds. No murmur heard.    No gallop.   Pulmonary:      Effort: Pulmonary effort is normal.      Breath sounds: Normal breath sounds.   Abdominal:      Palpations: Abdomen is soft. There is no mass.      Tenderness: There is no abdominal tenderness.   Musculoskeletal:         General: Normal range of motion.      Cervical back: Normal range of motion and neck supple.   Skin:     General: Skin is warm.   Neurological:      Mental Status: She is alert and oriented to person, place, and time.      Sensory: No sensory deficit.      Deep Tendon Reflexes: Reflexes are normal and symmetric.           Assessment:       1. Right leg swelling    2.  Paroxysmal atrial fibrillation    3. Typical atrial flutter    4. ICD (implantable cardioverter-defibrillator) in place    5. S/P CABG (coronary artery bypass graft)    6. Coronary artery disease involving native coronary artery of native heart without angina pectoris         Plan:     Will set up admission to get sotalol initiation in the hospital

## 2022-06-20 ENCOUNTER — PATIENT MESSAGE (OUTPATIENT)
Dept: CARDIOLOGY | Facility: CLINIC | Age: 82
End: 2022-06-20
Payer: MEDICARE

## 2022-06-20 ENCOUNTER — TELEPHONE (OUTPATIENT)
Dept: CARDIOLOGY | Facility: CLINIC | Age: 82
End: 2022-06-20
Payer: MEDICARE

## 2022-06-24 PROBLEM — I50.42 CHRONIC COMBINED SYSTOLIC AND DIASTOLIC HEART FAILURE: Status: ACTIVE | Noted: 2022-06-24

## 2022-07-01 ENCOUNTER — OFFICE VISIT (OUTPATIENT)
Dept: FAMILY MEDICINE | Facility: CLINIC | Age: 82
End: 2022-07-01
Payer: MEDICARE

## 2022-07-01 VITALS
DIASTOLIC BLOOD PRESSURE: 64 MMHG | BODY MASS INDEX: 26.79 KG/M2 | OXYGEN SATURATION: 98 % | HEIGHT: 66 IN | HEART RATE: 70 BPM | SYSTOLIC BLOOD PRESSURE: 110 MMHG | WEIGHT: 166.69 LBS

## 2022-07-01 DIAGNOSIS — I25.10 CORONARY ARTERY DISEASE INVOLVING NATIVE CORONARY ARTERY OF NATIVE HEART WITHOUT ANGINA PECTORIS: ICD-10-CM

## 2022-07-01 DIAGNOSIS — I70.0 ATHEROSCLEROSIS OF AORTA: ICD-10-CM

## 2022-07-01 DIAGNOSIS — I50.42 CHRONIC COMBINED SYSTOLIC AND DIASTOLIC HEART FAILURE: ICD-10-CM

## 2022-07-01 DIAGNOSIS — I42.0 ISCHEMIC DILATED CARDIOMYOPATHY: ICD-10-CM

## 2022-07-01 DIAGNOSIS — I25.5 ISCHEMIC DILATED CARDIOMYOPATHY: ICD-10-CM

## 2022-07-01 DIAGNOSIS — I47.20 VENTRICULAR TACHYCARDIA: ICD-10-CM

## 2022-07-01 DIAGNOSIS — Z00.00 ENCOUNTER FOR PREVENTIVE HEALTH EXAMINATION: Primary | ICD-10-CM

## 2022-07-01 DIAGNOSIS — I48.0 PAROXYSMAL ATRIAL FIBRILLATION: ICD-10-CM

## 2022-07-01 PROBLEM — I82.A12 ACUTE DEEP VEIN THROMBOSIS (DVT) OF AXILLARY VEIN OF LEFT UPPER EXTREMITY: Status: RESOLVED | Noted: 2018-07-28 | Resolved: 2022-07-01

## 2022-07-01 PROBLEM — L03.90 CELLULITIS: Status: RESOLVED | Noted: 2018-07-28 | Resolved: 2022-07-01

## 2022-07-01 PROBLEM — S30.1XXA HEMATOMA OF GROIN: Status: RESOLVED | Noted: 2018-08-03 | Resolved: 2022-07-01

## 2022-07-01 PROCEDURE — 99999 PR PBB SHADOW E&M-EST. PATIENT-LVL IV: ICD-10-PCS | Mod: PBBFAC,,, | Performed by: NURSE PRACTITIONER

## 2022-07-01 PROCEDURE — 99999 PR PBB SHADOW E&M-EST. PATIENT-LVL IV: CPT | Mod: PBBFAC,,, | Performed by: NURSE PRACTITIONER

## 2022-07-01 PROCEDURE — G0439 PR MEDICARE ANNUAL WELLNESS SUBSEQUENT VISIT: ICD-10-PCS | Mod: S$GLB,,, | Performed by: NURSE PRACTITIONER

## 2022-07-01 PROCEDURE — G0439 PPPS, SUBSEQ VISIT: HCPCS | Mod: S$GLB,,, | Performed by: NURSE PRACTITIONER

## 2022-07-01 NOTE — PATIENT INSTRUCTIONS
Counseling and Referral of Other Preventative  (Italic type indicates deductible and co-insurance are waived)    Patient Name: Sari Biswas  Today's Date: 7/1/2022    Health Maintenance       Date Due Completion Date    Shingles Vaccine (1 of 2) Never done ---    Influenza Vaccine (1) 09/01/2022 10/18/2021    DEXA Scan 06/16/2025 6/16/2022    TETANUS VACCINE 06/06/2026 6/6/2016    Lipid Panel 06/01/2027 6/1/2022        No orders of the defined types were placed in this encounter.    The following information is provided to all patients.  This information is to help you find resources for any of the problems found today that may be affecting your health:                Living healthy guide: www.[facility].[place].gov      Understanding Diabetes: www.diabetes.org      Eating healthy: www.cdc.gov/healthyweight      [facility] home safety checklist: www.cdc.gov/steadi/patient.html      Agency on Aging: www.goea.[place].gov      Alcoholics anonymous (AA): www.aa.org      Physical Activity: www.erickson.nih.gov/xe5hsqr      Tobacco use: www.quitwithusla.org

## 2022-07-01 NOTE — PROGRESS NOTES
"  Sari Biswas presented for a  Medicare AWV and comprehensive Health Risk Assessment today. The following components were reviewed and updated:    · Medical history  · Family History  · Social history  · Allergies and Current Medications  · Health Risk Assessment  · Health Maintenance  · Care Team         ** See Completed Assessments for Annual Wellness Visit within the encounter summary.**         The following assessments were completed:  · Living Situation  · CAGE  · Depression Screening  · Timed Get Up and Go  · Whisper Test  · Cognitive Function Screening          · Nutrition Screening  · ADL Screening  · PAQ Screening        Vitals:    07/01/22 0912   BP: 110/64   BP Location: Left arm   Patient Position: Sitting   BP Method: Medium (Manual)   Pulse: 70   SpO2: 98%   Weight: 75.6 kg (166 lb 10.7 oz)   Height: 5' 6" (1.676 m)     Body mass index is 26.9 kg/m².  Physical Exam  Vitals reviewed.   Constitutional:       General: She is not in acute distress.  HENT:      Head: Normocephalic.   Cardiovascular:      Rate and Rhythm: Normal rate.   Pulmonary:      Effort: Pulmonary effort is normal. No respiratory distress.   Skin:     General: Skin is warm.   Neurological:      Mental Status: She is alert.   Psychiatric:         Mood and Affect: Mood normal.               Diagnoses and health risks identified today and associated recommendations/orders:    1. Encounter for preventive health examination  Reviewed health maintenance and provided recommendations   Recommend shingrix vaccine     2. Atherosclerosis of aorta  Continue to monitor  Followed by Dr Singleton.      3. Coronary artery disease involving native coronary artery of native heart without angina pectoris  Continue to monitor  Followed by Dr Singleton  No CP.      4. Paroxysmal atrial fibrillation  Continue to monitor  Followed by Dr Fitzgerald.      5. Ischemic dilated cardiomyopathy  Continue to monitor  Followed by Dr Singleton.      6. Ventricular " tachycardia  Continue to monitor  Followed by Dr Fitzgerald.      7. Chronic combined systolic and diastolic heart failure  Continue to monitor  Followed by Dr Singleton.        Provided Sari with a 5-10 year written screening schedule and personal prevention plan. Recommendations were developed using the USPSTF age appropriate recommendations. Education, counseling, and referrals were provided as needed. After Visit Summary printed and given to patient which includes a list of additional screenings\tests needed.    Follow up in one year    Andreea Anaya NP    I offered to discuss advanced care planning, including how to pick a person who would make decisions for you if you were unable to make them for yourself, called a health care power of , and what kind of decisions you might make such as use of life sustaining treatments such as ventilators and tube feeding when faced with a life limiting illness recorded on a living will that they will need to know. (How you want to be cared for as you near the end of your natural life)     X Patient is interested in learning more about how to make advanced directives.  I provided them paperwork and offered to discuss this with them.

## 2022-07-15 ENCOUNTER — OFFICE VISIT (OUTPATIENT)
Dept: CARDIOLOGY | Facility: CLINIC | Age: 82
End: 2022-07-15
Payer: MEDICARE

## 2022-07-15 VITALS
DIASTOLIC BLOOD PRESSURE: 64 MMHG | HEART RATE: 71 BPM | HEIGHT: 66 IN | WEIGHT: 164.88 LBS | SYSTOLIC BLOOD PRESSURE: 100 MMHG | BODY MASS INDEX: 26.5 KG/M2

## 2022-07-15 DIAGNOSIS — Z95.810 ICD (IMPLANTABLE CARDIOVERTER-DEFIBRILLATOR) IN PLACE: ICD-10-CM

## 2022-07-15 DIAGNOSIS — I25.5 ISCHEMIC DILATED CARDIOMYOPATHY: ICD-10-CM

## 2022-07-15 DIAGNOSIS — I48.3 TYPICAL ATRIAL FLUTTER: ICD-10-CM

## 2022-07-15 DIAGNOSIS — I10 PRIMARY HYPERTENSION: ICD-10-CM

## 2022-07-15 DIAGNOSIS — E78.5 HYPERLIPIDEMIA, UNSPECIFIED HYPERLIPIDEMIA TYPE: ICD-10-CM

## 2022-07-15 DIAGNOSIS — Z95.1 S/P CABG (CORONARY ARTERY BYPASS GRAFT): ICD-10-CM

## 2022-07-15 DIAGNOSIS — I48.91 ATRIAL FIBRILLATION, UNSPECIFIED TYPE: Primary | ICD-10-CM

## 2022-07-15 DIAGNOSIS — I42.0 ISCHEMIC DILATED CARDIOMYOPATHY: ICD-10-CM

## 2022-07-15 DIAGNOSIS — I50.43 ACUTE ON CHRONIC COMBINED SYSTOLIC (CONGESTIVE) AND DIASTOLIC (CONGESTIVE) HEART FAILURE: ICD-10-CM

## 2022-07-15 DIAGNOSIS — I70.0 ATHEROSCLEROSIS OF AORTA: ICD-10-CM

## 2022-07-15 DIAGNOSIS — I50.42 CHRONIC COMBINED SYSTOLIC AND DIASTOLIC HEART FAILURE: ICD-10-CM

## 2022-07-15 PROCEDURE — 93005 ELECTROCARDIOGRAM TRACING: CPT | Mod: PO

## 2022-07-15 PROCEDURE — 99214 PR OFFICE/OUTPT VISIT, EST, LEVL IV, 30-39 MIN: ICD-10-PCS | Mod: S$GLB,,, | Performed by: PHYSICIAN ASSISTANT

## 2022-07-15 PROCEDURE — 99214 OFFICE O/P EST MOD 30 MIN: CPT | Mod: S$GLB,,, | Performed by: PHYSICIAN ASSISTANT

## 2022-07-15 PROCEDURE — 1111F PR DISCHARGE MEDS RECONCILED W/ CURRENT OUTPATIENT MED LIST: ICD-10-PCS | Mod: CPTII,S$GLB,, | Performed by: PHYSICIAN ASSISTANT

## 2022-07-15 PROCEDURE — 99999 PR PBB SHADOW E&M-EST. PATIENT-LVL II: ICD-10-PCS | Mod: PBBFAC,,, | Performed by: PHYSICIAN ASSISTANT

## 2022-07-15 PROCEDURE — 93010 EKG 12-LEAD: ICD-10-PCS | Mod: S$GLB,,, | Performed by: INTERNAL MEDICINE

## 2022-07-15 PROCEDURE — 99999 PR PBB SHADOW E&M-EST. PATIENT-LVL II: CPT | Mod: PBBFAC,,, | Performed by: PHYSICIAN ASSISTANT

## 2022-07-15 PROCEDURE — 93010 ELECTROCARDIOGRAM REPORT: CPT | Mod: S$GLB,,, | Performed by: INTERNAL MEDICINE

## 2022-07-15 PROCEDURE — 1111F DSCHRG MED/CURRENT MED MERGE: CPT | Mod: CPTII,S$GLB,, | Performed by: PHYSICIAN ASSISTANT

## 2022-07-15 NOTE — PROGRESS NOTES
"Subjective:    Patient ID:  Sari Biswas is a 82 y.o. female who presents for follow-up of atrial fibrillation.       HPI  Ms. Biswas is a delightful lady who follows with Dr. Singleton. She presents today for follow up after a recent hospital admission for Sotalol initiation. She has done well since her discharge. No CP, COMBS, or change in her exercise tolerance. No palpitations. No perceived side effects from Sotalol.     Review of Systems   Constitutional: Negative for chills, diaphoresis, fever, weight gain and weight loss.   HENT: Negative for sore throat.    Eyes: Negative for blurred vision, vision loss in left eye, vision loss in right eye and visual disturbance.   Cardiovascular: Negative for chest pain, claudication, dyspnea on exertion, leg swelling, near-syncope, orthopnea, palpitations, paroxysmal nocturnal dyspnea and syncope.   Respiratory: Negative for cough, hemoptysis, shortness of breath, sputum production and wheezing.    Endocrine: Negative for cold intolerance and heat intolerance.   Hematologic/Lymphatic: Negative for adenopathy. Does not bruise/bleed easily.   Skin: Negative for rash.   Musculoskeletal: Negative for falls, muscle weakness and myalgias.   Gastrointestinal: Negative for abdominal pain, change in bowel habit, constipation, diarrhea, melena and nausea.   Genitourinary: Negative for bladder incontinence.   Neurological: Negative for dizziness, focal weakness, headaches, light-headedness, numbness and weakness.   Psychiatric/Behavioral: Negative for altered mental status.         Vitals:    07/15/22 1427   BP: 100/64   BP Location: Right arm   Patient Position: Sitting   BP Method: Medium (Automatic)   Pulse: 71   Weight: 74.8 kg (164 lb 14.5 oz)   Height: 5' 6" (1.676 m)   Body mass index is 26.62 kg/m².    Objective:    Physical Exam  Constitutional:       General: She is not in acute distress.     Appearance: She is well-developed.   HENT:      Head: Normocephalic and " atraumatic.   Eyes:      General: No scleral icterus.     Conjunctiva/sclera: Conjunctivae normal.      Pupils: Pupils are equal, round, and reactive to light.   Neck:      Vascular: No JVD.      Trachea: No tracheal deviation.   Cardiovascular:      Rate and Rhythm: Normal rate and regular rhythm.      Heart sounds: No murmur heard.    No friction rub. No gallop.   Pulmonary:      Effort: Pulmonary effort is normal. No respiratory distress.      Breath sounds: Normal breath sounds. No wheezing or rales.   Chest:      Chest wall: No tenderness.   Abdominal:      General: Bowel sounds are normal. There is no distension.      Palpations: Abdomen is soft.      Tenderness: There is no abdominal tenderness.   Musculoskeletal:         General: No tenderness.      Cervical back: Neck supple.   Skin:     General: Skin is warm and dry.      Findings: No erythema or rash.   Neurological:      Mental Status: She is alert and oriented to person, place, and time.   Psychiatric:         Behavior: Behavior normal.     EKG: NSR, QTc 435      Assessment:       Problem List Items Addressed This Visit        Cardiology Problems    Acute on chronic combined systolic (congestive) and diastolic (congestive) heart failure    Atherosclerosis of aorta    Atrial fibrillation - Primary    Relevant Orders    IN OFFICE EKG 12-LEAD (to [place])    Chronic combined systolic and diastolic heart failure    Hyperlipidemia    Hypertension    Ischemic dilated cardiomyopathy    Typical atrial flutter       Other    ICD (implantable cardioverter-defibrillator) in place    S/P CABG (coronary artery bypass graft)           Plan:       Continue current cardiac medications.   Risk factor modification including diet and exercise.   F/U with Dr. Singleton in 6 months.

## 2022-08-12 ENCOUNTER — CLINICAL SUPPORT (OUTPATIENT)
Dept: CARDIOLOGY | Facility: HOSPITAL | Age: 82
End: 2022-08-12
Payer: MEDICARE

## 2022-08-12 DIAGNOSIS — Z95.810 PRESENCE OF AUTOMATIC (IMPLANTABLE) CARDIAC DEFIBRILLATOR: ICD-10-CM

## 2022-08-12 PROCEDURE — 93296 REM INTERROG EVL PM/IDS: CPT | Mod: PO | Performed by: INTERNAL MEDICINE

## 2022-08-16 ENCOUNTER — PATIENT MESSAGE (OUTPATIENT)
Dept: CARDIOLOGY | Facility: CLINIC | Age: 82
End: 2022-08-16
Payer: MEDICARE

## 2022-09-06 ENCOUNTER — NURSE TRIAGE (OUTPATIENT)
Dept: ADMINISTRATIVE | Facility: CLINIC | Age: 82
End: 2022-09-06
Payer: MEDICARE

## 2022-09-06 NOTE — TELEPHONE ENCOUNTER
OOC outgoing call -     VM left for Pt explaining attempt to call for follow up and if having a medical emergency to call 911 or go to the nearest ER. Another attempt will be made to contact you on this line shortly.    Pt called ooc back. Incoming call answered.  Sat 98-96%, 127/85, hr 77.  Pt c/o  flu like symptoms since Friday, cold cough afebrile, covid postive today. Covid protocol followed and pt advised to speak with a telemedicine doctor. O[facility] offered and accepted. Instructed Pt if she cannot speak to a OAC doctor then to go to the UC/ER for immediate care to see a doctor d/t high risk and new and worsening symptoms. Education provided on covid, isolation, paxlovid, tylenol, cough tx, see care advice. Pt instructed to call ooc at [phone] if she has any future questions or concerns. Encounter routed to PCP/staff as high priority.   Reason for Disposition   HIGH RISK for severe COVID complications (e.g., weak immune system, age > 64 years, obesity with BMI > 25, pregnant, chronic lung disease or other chronic medical condition)  (Exception: Already seen by PCP and no new or worsening symptoms.)    Additional Information   Negative: SEVERE difficulty breathing (e.g., struggling for each breath, speaks in single words)   Negative: Difficult to awaken or acting confused (e.g., disoriented, slurred speech)   Negative: Bluish (or gray) lips or face now   Negative: Shock suspected (e.g., cold/pale/clammy skin, too weak to stand, low BP, rapid pulse)   Negative: Sounds like a life-threatening emergency to the triager   Negative: SEVERE or constant chest pain or pressure  (Exception: Mild central chest pain, present only when coughing.)   Negative: MODERATE difficulty breathing (e.g., speaks in phrases, SOB even at rest, pulse 100-120)   Negative: [1] Headache AND [2] stiff neck (can't touch chin to chest)   Negative: Oxygen level (e.g., pulse oximetry) 90 percent or lower   Negative: Chest pain or pressure    Negative: Patient sounds very sick or weak to the triager   Negative: MILD difficulty breathing (e.g., minimal/no SOB at rest, SOB with walking, pulse <100)   Negative: Fever > 103 F (39.4 C)   Negative: [1] Fever > 101 F (38.3 C) AND [2] age > 60 years   Negative: [1] Fever > 100.0 F (37.8 C) AND [2] bedridden (e.g., nursing home patient, CVA, chronic illness, recovering from surgery)    Protocols used: Coronavirus (COVID-19) Diagnosed or [id number]

## 2022-09-19 ENCOUNTER — CLINICAL SUPPORT (OUTPATIENT)
Dept: PRIMARY CARE CLINIC | Facility: CLINIC | Age: 82
End: 2022-09-19
Payer: MEDICARE

## 2022-09-19 DIAGNOSIS — Z23 NEED FOR INFLUENZA VACCINATION: Primary | ICD-10-CM

## 2022-09-19 PROCEDURE — 90694 FLU VACCINE - QUADRIVALENT - ADJUVANTED: ICD-10-PCS | Mod: S$GLB,,, | Performed by: FAMILY MEDICINE

## 2022-09-19 PROCEDURE — 90694 VACC AIIV4 NO PRSRV 0.5ML IM: CPT | Mod: S$GLB,,, | Performed by: FAMILY MEDICINE

## 2022-09-19 PROCEDURE — G0008 FLU VACCINE - QUADRIVALENT - ADJUVANTED: ICD-10-PCS | Mod: S$GLB,,, | Performed by: FAMILY MEDICINE

## 2022-09-19 PROCEDURE — G0008 ADMIN INFLUENZA VIRUS VAC: HCPCS | Mod: S$GLB,,, | Performed by: FAMILY MEDICINE

## 2022-09-20 ENCOUNTER — TELEPHONE (OUTPATIENT)
Dept: DERMATOLOGY | Facility: CLINIC | Age: 82
End: 2022-09-20
Payer: MEDICARE

## 2022-09-20 NOTE — TELEPHONE ENCOUNTER
Pt advised that Mera Ocampo MD is not taking new pt. Pt states understanding. Offered appts for [place], [place] and [place]. Pt declined making an appt at this.

## 2022-09-20 NOTE — TELEPHONE ENCOUNTER
----- Message from Lavinia Pardo sent at 9/19/2022  4:06 PM CDT -----  Contact: pt  Type: Needs Appt    Who Called: pt  Best Call Back Number: [phone]    Inquiry/Question: pt needs a annual appt for a skin check       Thank you~

## 2022-11-10 ENCOUNTER — CLINICAL SUPPORT (OUTPATIENT)
Dept: CARDIOLOGY | Facility: HOSPITAL | Age: 82
End: 2022-11-10
Payer: MEDICARE

## 2022-11-10 DIAGNOSIS — Z95.810 PRESENCE OF AUTOMATIC (IMPLANTABLE) CARDIAC DEFIBRILLATOR: ICD-10-CM

## 2022-11-10 PROCEDURE — 93295 DEV INTERROG REMOTE 1/2/MLT: CPT | Mod: ,,, | Performed by: INTERNAL MEDICINE

## 2022-11-10 PROCEDURE — 93295 CARDIAC DEVICE CHECK - REMOTE: ICD-10-PCS | Mod: ,,, | Performed by: INTERNAL MEDICINE

## 2022-11-10 PROCEDURE — 93296 REM INTERROG EVL PM/IDS: CPT | Mod: PO | Performed by: INTERNAL MEDICINE

## 2022-11-23 ENCOUNTER — OFFICE VISIT (OUTPATIENT)
Dept: OPHTHALMOLOGY | Facility: CLINIC | Age: 82
End: 2022-11-23
Payer: MEDICARE

## 2022-11-23 DIAGNOSIS — H35.3133 ADVANCED ATROPHIC NONEXUDATIVE AGE-RELATED MACULAR DEGENERATION OF BOTH EYES WITHOUT SUBFOVEAL INVOLVEMENT: Primary | ICD-10-CM

## 2022-11-23 DIAGNOSIS — Z96.1 PSEUDOPHAKIA OF BOTH EYES: ICD-10-CM

## 2022-11-23 PROCEDURE — 1126F PR PAIN SEVERITY QUANTIFIED, NO PAIN PRESENT: ICD-10-PCS | Mod: CPTII,S$GLB,, | Performed by: OPHTHALMOLOGY

## 2022-11-23 PROCEDURE — 1101F PT FALLS ASSESS-DOCD LE1/YR: CPT | Mod: CPTII,S$GLB,, | Performed by: OPHTHALMOLOGY

## 2022-11-23 PROCEDURE — 1101F PR PT FALLS ASSESS DOC 0-1 FALLS W/OUT INJ PAST YR: ICD-10-PCS | Mod: CPTII,S$GLB,, | Performed by: OPHTHALMOLOGY

## 2022-11-23 PROCEDURE — 92134 CPTRZ OPH DX IMG PST SGM RTA: CPT | Mod: S$GLB,,, | Performed by: OPHTHALMOLOGY

## 2022-11-23 PROCEDURE — 99999 PR PBB SHADOW E&M-EST. PATIENT-LVL III: CPT | Mod: PBBFAC,,, | Performed by: OPHTHALMOLOGY

## 2022-11-23 PROCEDURE — 1159F MED LIST DOCD IN RCRD: CPT | Mod: CPTII,S$GLB,, | Performed by: OPHTHALMOLOGY

## 2022-11-23 PROCEDURE — 92134 POSTERIOR SEGMENT OCT RETINA (OCULAR COHERENCE TOMOGRAPHY)-BOTH EYES: ICD-10-PCS | Mod: S$GLB,,, | Performed by: OPHTHALMOLOGY

## 2022-11-23 PROCEDURE — 3288F FALL RISK ASSESSMENT DOCD: CPT | Mod: CPTII,S$GLB,, | Performed by: OPHTHALMOLOGY

## 2022-11-23 PROCEDURE — 1159F PR MEDICATION LIST DOCUMENTED IN MEDICAL RECORD: ICD-10-PCS | Mod: CPTII,S$GLB,, | Performed by: OPHTHALMOLOGY

## 2022-11-23 PROCEDURE — 99213 PR OFFICE/OUTPT VISIT, EST, LEVL III, 20-29 MIN: ICD-10-PCS | Mod: S$GLB,,, | Performed by: OPHTHALMOLOGY

## 2022-11-23 PROCEDURE — 3288F PR FALLS RISK ASSESSMENT DOCUMENTED: ICD-10-PCS | Mod: CPTII,S$GLB,, | Performed by: OPHTHALMOLOGY

## 2022-11-23 PROCEDURE — 99213 OFFICE O/P EST LOW 20 MIN: CPT | Mod: S$GLB,,, | Performed by: OPHTHALMOLOGY

## 2022-11-23 PROCEDURE — 1126F AMNT PAIN NOTED NONE PRSNT: CPT | Mod: CPTII,S$GLB,, | Performed by: OPHTHALMOLOGY

## 2022-11-23 PROCEDURE — 99999 PR PBB SHADOW E&M-EST. PATIENT-LVL III: ICD-10-PCS | Mod: PBBFAC,,, | Performed by: OPHTHALMOLOGY

## 2022-11-23 PROCEDURE — 1160F PR REVIEW ALL MEDS BY PRESCRIBER/CLIN PHARMACIST DOCUMENTED: ICD-10-PCS | Mod: CPTII,S$GLB,, | Performed by: OPHTHALMOLOGY

## 2022-11-23 PROCEDURE — 1160F RVW MEDS BY RX/DR IN RCRD: CPT | Mod: CPTII,S$GLB,, | Performed by: OPHTHALMOLOGY

## 2022-11-23 NOTE — PROGRESS NOTES
HPI     Macular Degeneration     Additional comments: 6 month f/u for ARMD with OCT mac           Comments    DLS: 5/17/22    Pt states no changes in va since last visit.     Gtts: Refresh BID OU          Last edited by Cheryle Quintana on 11/23/2022  9:31 AM.        ROS    Negative for: Constitutional, Gastrointestinal, Neurological, Skin,   Genitourinary, Musculoskeletal, HENT, Endocrine, Cardiovascular, Eyes,   Respiratory, Psychiatric, Allergic/Imm, Heme/Lymph  Last edited by Tereso Franklin Jr., MD on 11/23/2022 10:06 AM.        Assessment /Plan     For exam results, see Encounter Report.    Advanced atrophic nonexudative age-related macular degeneration of both eyes without subfoveal involvement    Pseudophakia of both eyes    Continue AREDS2 BID   Amsler monitoring OU  Continue current glass rx  Follow up in about 6 months (around 5/23/2023) for f/u Dry ARMD both eyes.

## 2022-12-09 ENCOUNTER — IMMUNIZATION (OUTPATIENT)
Dept: FAMILY MEDICINE | Facility: CLINIC | Age: 82
End: 2022-12-09
Payer: MEDICARE

## 2022-12-09 DIAGNOSIS — Z23 NEED FOR VACCINATION: Primary | ICD-10-CM

## 2022-12-09 PROCEDURE — 91312 COVID-19, MRNA, LNP-S, BIVALENT BOOSTER, PF, 30 MCG/0.3 ML DOSE: ICD-10-PCS | Mod: S$GLB,,, | Performed by: FAMILY MEDICINE

## 2022-12-09 PROCEDURE — 0124A COVID-19, MRNA, LNP-S, BIVALENT BOOSTER, PF, 30 MCG/0.3 ML DOSE: CPT | Mod: PBBFAC | Performed by: FAMILY MEDICINE

## 2022-12-09 PROCEDURE — 91312 COVID-19, MRNA, LNP-S, BIVALENT BOOSTER, PF, 30 MCG/0.3 ML DOSE: CPT | Mod: S$GLB,,, | Performed by: FAMILY MEDICINE

## 2022-12-30 ENCOUNTER — PES CALL (OUTPATIENT)
Dept: ADMINISTRATIVE | Facility: CLINIC | Age: 82
End: 2022-12-30
Payer: MEDICARE

## 2023-01-21 DIAGNOSIS — E78.5 HYPERLIPIDEMIA, UNSPECIFIED HYPERLIPIDEMIA TYPE: ICD-10-CM

## 2023-01-23 RX ORDER — SPIRONOLACTONE 25 MG/1
12.5 TABLET ORAL DAILY
Qty: 46 TABLET | Refills: 3 | Status: SHIPPED | OUTPATIENT
Start: 2023-01-23 | End: 2024-02-12 | Stop reason: SDUPTHER

## 2023-01-23 RX ORDER — FUROSEMIDE 20 MG/1
20 TABLET ORAL DAILY
Qty: 90 TABLET | Refills: 3 | Status: SHIPPED | OUTPATIENT
Start: 2023-01-23 | End: 2024-01-06 | Stop reason: SDUPTHER

## 2023-01-23 RX ORDER — SACUBITRIL AND VALSARTAN 24; 26 MG/1; MG/1
1 TABLET, FILM COATED ORAL 2 TIMES DAILY
Qty: 180 TABLET | Refills: 3 | Status: SHIPPED | OUTPATIENT
Start: 2023-01-23 | End: 2024-02-03 | Stop reason: SDUPTHER

## 2023-01-23 RX ORDER — ROSUVASTATIN CALCIUM 5 MG/1
5 TABLET, COATED ORAL NIGHTLY
Qty: 90 TABLET | Refills: 3 | Status: SHIPPED | OUTPATIENT
Start: 2023-01-23 | End: 2024-02-03 | Stop reason: SDUPTHER

## 2023-01-30 ENCOUNTER — OFFICE VISIT (OUTPATIENT)
Dept: CARDIOLOGY | Facility: CLINIC | Age: 83
End: 2023-01-30
Payer: MEDICARE

## 2023-01-30 VITALS
HEART RATE: 82 BPM | SYSTOLIC BLOOD PRESSURE: 110 MMHG | WEIGHT: 176.38 LBS | BODY MASS INDEX: 29.38 KG/M2 | DIASTOLIC BLOOD PRESSURE: 66 MMHG | HEIGHT: 65 IN

## 2023-01-30 DIAGNOSIS — E78.5 HYPERLIPIDEMIA, UNSPECIFIED HYPERLIPIDEMIA TYPE: ICD-10-CM

## 2023-01-30 DIAGNOSIS — I47.20 VENTRICULAR TACHYCARDIA: Primary | ICD-10-CM

## 2023-01-30 DIAGNOSIS — Z95.1 S/P CABG (CORONARY ARTERY BYPASS GRAFT): ICD-10-CM

## 2023-01-30 DIAGNOSIS — I25.5 ISCHEMIC DILATED CARDIOMYOPATHY: ICD-10-CM

## 2023-01-30 DIAGNOSIS — I48.0 PAROXYSMAL ATRIAL FIBRILLATION: ICD-10-CM

## 2023-01-30 DIAGNOSIS — I42.0 ISCHEMIC DILATED CARDIOMYOPATHY: ICD-10-CM

## 2023-01-30 DIAGNOSIS — I10 PRIMARY HYPERTENSION: ICD-10-CM

## 2023-01-30 DIAGNOSIS — Z95.810 ICD (IMPLANTABLE CARDIOVERTER-DEFIBRILLATOR) IN PLACE: ICD-10-CM

## 2023-01-30 PROCEDURE — 99214 OFFICE O/P EST MOD 30 MIN: CPT | Mod: S$GLB,,, | Performed by: INTERNAL MEDICINE

## 2023-01-30 PROCEDURE — 3078F DIAST BP <80 MM HG: CPT | Mod: CPTII,S$GLB,, | Performed by: INTERNAL MEDICINE

## 2023-01-30 PROCEDURE — 3074F PR MOST RECENT SYSTOLIC BLOOD PRESSURE < 130 MM HG: ICD-10-PCS | Mod: CPTII,S$GLB,, | Performed by: INTERNAL MEDICINE

## 2023-01-30 PROCEDURE — 1159F MED LIST DOCD IN RCRD: CPT | Mod: CPTII,S$GLB,, | Performed by: INTERNAL MEDICINE

## 2023-01-30 PROCEDURE — 1160F PR REVIEW ALL MEDS BY PRESCRIBER/CLIN PHARMACIST DOCUMENTED: ICD-10-PCS | Mod: CPTII,S$GLB,, | Performed by: INTERNAL MEDICINE

## 2023-01-30 PROCEDURE — 1159F PR MEDICATION LIST DOCUMENTED IN MEDICAL RECORD: ICD-10-PCS | Mod: CPTII,S$GLB,, | Performed by: INTERNAL MEDICINE

## 2023-01-30 PROCEDURE — 1160F RVW MEDS BY RX/DR IN RCRD: CPT | Mod: CPTII,S$GLB,, | Performed by: INTERNAL MEDICINE

## 2023-01-30 PROCEDURE — 1126F AMNT PAIN NOTED NONE PRSNT: CPT | Mod: CPTII,S$GLB,, | Performed by: INTERNAL MEDICINE

## 2023-01-30 PROCEDURE — 99214 PR OFFICE/OUTPT VISIT, EST, LEVL IV, 30-39 MIN: ICD-10-PCS | Mod: S$GLB,,, | Performed by: INTERNAL MEDICINE

## 2023-01-30 PROCEDURE — 1126F PR PAIN SEVERITY QUANTIFIED, NO PAIN PRESENT: ICD-10-PCS | Mod: CPTII,S$GLB,, | Performed by: INTERNAL MEDICINE

## 2023-01-30 PROCEDURE — 99999 PR PBB SHADOW E&M-EST. PATIENT-LVL III: ICD-10-PCS | Mod: PBBFAC,,, | Performed by: INTERNAL MEDICINE

## 2023-01-30 PROCEDURE — 99999 PR PBB SHADOW E&M-EST. PATIENT-LVL III: CPT | Mod: PBBFAC,,, | Performed by: INTERNAL MEDICINE

## 2023-01-30 PROCEDURE — 3078F PR MOST RECENT DIASTOLIC BLOOD PRESSURE < 80 MM HG: ICD-10-PCS | Mod: CPTII,S$GLB,, | Performed by: INTERNAL MEDICINE

## 2023-01-30 PROCEDURE — 3074F SYST BP LT 130 MM HG: CPT | Mod: CPTII,S$GLB,, | Performed by: INTERNAL MEDICINE

## 2023-01-30 NOTE — PROGRESS NOTES
Subjective:    Patient ID:  Sari Biswas is a 82 y.o. female who presents for follow-up of Atrial Flutter (6 month f/u )      HPI  She comes with no complaints, no chest pain, no shortness of breath  No major issues other than fingers in left hand turn white and numb, with no particular reason  FC II    Review of Systems   Constitutional: Negative for decreased appetite, malaise/fatigue, weight gain and weight loss.   Cardiovascular:  Negative for chest pain, dyspnea on exertion, leg swelling, palpitations and syncope.   Respiratory:  Negative for cough and shortness of breath.    Gastrointestinal: Negative.    Neurological:  Negative for weakness.   All other systems reviewed and are negative.     Objective:      Physical Exam  Vitals and nursing note reviewed.   Constitutional:       Appearance: Normal appearance. She is well-developed.   HENT:      Head: Normocephalic.   Eyes:      Pupils: Pupils are equal, round, and reactive to light.   Neck:      Thyroid: No thyromegaly.      Vascular: No carotid bruit or JVD.   Cardiovascular:      Rate and Rhythm: Normal rate and regular rhythm.      Chest Wall: PMI is not displaced.      Pulses: Normal pulses and intact distal pulses.      Heart sounds: Normal heart sounds. No murmur heard.    No gallop.   Pulmonary:      Effort: Pulmonary effort is normal.      Breath sounds: Normal breath sounds.   Abdominal:      Palpations: Abdomen is soft. There is no mass.      Tenderness: There is no abdominal tenderness.   Musculoskeletal:         General: Normal range of motion.      Cervical back: Normal range of motion and neck supple.   Skin:     General: Skin is warm.   Neurological:      Mental Status: She is alert and oriented to person, place, and time.      Sensory: No sensory deficit.      Deep Tendon Reflexes: Reflexes are normal and symmetric.       Assessment:       1. Ventricular tachycardia    2. S/P CABG (coronary artery bypass graft)    3. Ischemic dilated  cardiomyopathy    4. Paroxysmal atrial fibrillation    5. ICD (implantable cardioverter-defibrillator) in place    6. Primary hypertension    7. Hyperlipidemia, unspecified hyperlipidemia type         Plan:   Decrease plavix to 4/week  Continue all cardiac medications  Regular exercise program  Weight loss  6 m f/u with YISSEL Andino

## 2023-02-08 ENCOUNTER — CLINICAL SUPPORT (OUTPATIENT)
Dept: CARDIOLOGY | Facility: HOSPITAL | Age: 83
End: 2023-02-08
Payer: MEDICARE

## 2023-02-08 DIAGNOSIS — Z95.810 PRESENCE OF AUTOMATIC (IMPLANTABLE) CARDIAC DEFIBRILLATOR: ICD-10-CM

## 2023-02-08 PROCEDURE — 93296 REM INTERROG EVL PM/IDS: CPT | Mod: PO | Performed by: INTERNAL MEDICINE

## 2023-02-20 ENCOUNTER — TELEPHONE (OUTPATIENT)
Dept: CARDIOLOGY | Facility: CLINIC | Age: 83
End: 2023-02-20
Payer: MEDICARE

## 2023-02-20 DIAGNOSIS — Z95.1 S/P CABG (CORONARY ARTERY BYPASS GRAFT): Primary | ICD-10-CM

## 2023-02-20 NOTE — TELEPHONE ENCOUNTER
----- Message from Melissa Alva sent at 2/20/2023 11:16 AM CST -----  Contact: [phone]  Type: Needs Medical Advice  Who Called:  Pt     Best Call Back Number: [phone] (home)     Additional Information: Pt requesting orders for an Echo be added to her chart. Pls call back and advise

## 2023-02-22 ENCOUNTER — OFFICE VISIT (OUTPATIENT)
Dept: DERMATOLOGY | Facility: CLINIC | Age: 83
End: 2023-02-22
Payer: MEDICARE

## 2023-02-22 VITALS — BODY MASS INDEX: 29.16 KG/M2 | WEIGHT: 175 LBS | HEIGHT: 65 IN

## 2023-02-22 DIAGNOSIS — D22.9 MULTIPLE BENIGN NEVI: ICD-10-CM

## 2023-02-22 DIAGNOSIS — D18.01 CHERRY ANGIOMA: ICD-10-CM

## 2023-02-22 DIAGNOSIS — L57.0 ACTINIC KERATOSIS: ICD-10-CM

## 2023-02-22 DIAGNOSIS — L30.4 INTERTRIGO: ICD-10-CM

## 2023-02-22 DIAGNOSIS — L21.9 SEBORRHEIC DERMATITIS: Primary | ICD-10-CM

## 2023-02-22 DIAGNOSIS — L82.1 SEBORRHEIC KERATOSES: ICD-10-CM

## 2023-02-22 PROCEDURE — 3288F FALL RISK ASSESSMENT DOCD: CPT | Mod: CPTII,S$GLB,, | Performed by: STUDENT IN AN ORGANIZED HEALTH CARE EDUCATION/TRAINING PROGRAM

## 2023-02-22 PROCEDURE — 1159F MED LIST DOCD IN RCRD: CPT | Mod: CPTII,S$GLB,, | Performed by: STUDENT IN AN ORGANIZED HEALTH CARE EDUCATION/TRAINING PROGRAM

## 2023-02-22 PROCEDURE — 3288F PR FALLS RISK ASSESSMENT DOCUMENTED: ICD-10-PCS | Mod: CPTII,S$GLB,, | Performed by: STUDENT IN AN ORGANIZED HEALTH CARE EDUCATION/TRAINING PROGRAM

## 2023-02-22 PROCEDURE — 1160F RVW MEDS BY RX/DR IN RCRD: CPT | Mod: CPTII,S$GLB,, | Performed by: STUDENT IN AN ORGANIZED HEALTH CARE EDUCATION/TRAINING PROGRAM

## 2023-02-22 PROCEDURE — 1101F PT FALLS ASSESS-DOCD LE1/YR: CPT | Mod: CPTII,S$GLB,, | Performed by: STUDENT IN AN ORGANIZED HEALTH CARE EDUCATION/TRAINING PROGRAM

## 2023-02-22 PROCEDURE — 99204 PR OFFICE/OUTPT VISIT, NEW, LEVL IV, 45-59 MIN: ICD-10-PCS | Mod: 25,S$GLB,, | Performed by: STUDENT IN AN ORGANIZED HEALTH CARE EDUCATION/TRAINING PROGRAM

## 2023-02-22 PROCEDURE — 1126F PR PAIN SEVERITY QUANTIFIED, NO PAIN PRESENT: ICD-10-PCS | Mod: CPTII,S$GLB,, | Performed by: STUDENT IN AN ORGANIZED HEALTH CARE EDUCATION/TRAINING PROGRAM

## 2023-02-22 PROCEDURE — 17000 DESTRUCT PREMALG LESION: CPT | Mod: S$GLB,,, | Performed by: STUDENT IN AN ORGANIZED HEALTH CARE EDUCATION/TRAINING PROGRAM

## 2023-02-22 PROCEDURE — 17000 PR DESTRUCTION(LASER SURGERY,CRYOSURGERY,CHEMOSURGERY),PREMALIGNANT LESIONS,FIRST LESION: ICD-10-PCS | Mod: S$GLB,,, | Performed by: STUDENT IN AN ORGANIZED HEALTH CARE EDUCATION/TRAINING PROGRAM

## 2023-02-22 PROCEDURE — 99999 PR PBB SHADOW E&M-EST. PATIENT-LVL IV: CPT | Mod: PBBFAC,,, | Performed by: STUDENT IN AN ORGANIZED HEALTH CARE EDUCATION/TRAINING PROGRAM

## 2023-02-22 PROCEDURE — 99204 OFFICE O/P NEW MOD 45 MIN: CPT | Mod: 25,S$GLB,, | Performed by: STUDENT IN AN ORGANIZED HEALTH CARE EDUCATION/TRAINING PROGRAM

## 2023-02-22 PROCEDURE — 1126F AMNT PAIN NOTED NONE PRSNT: CPT | Mod: CPTII,S$GLB,, | Performed by: STUDENT IN AN ORGANIZED HEALTH CARE EDUCATION/TRAINING PROGRAM

## 2023-02-22 PROCEDURE — 1101F PR PT FALLS ASSESS DOC 0-1 FALLS W/OUT INJ PAST YR: ICD-10-PCS | Mod: CPTII,S$GLB,, | Performed by: STUDENT IN AN ORGANIZED HEALTH CARE EDUCATION/TRAINING PROGRAM

## 2023-02-22 PROCEDURE — 1160F PR REVIEW ALL MEDS BY PRESCRIBER/CLIN PHARMACIST DOCUMENTED: ICD-10-PCS | Mod: CPTII,S$GLB,, | Performed by: STUDENT IN AN ORGANIZED HEALTH CARE EDUCATION/TRAINING PROGRAM

## 2023-02-22 PROCEDURE — 99999 PR PBB SHADOW E&M-EST. PATIENT-LVL IV: ICD-10-PCS | Mod: PBBFAC,,, | Performed by: STUDENT IN AN ORGANIZED HEALTH CARE EDUCATION/TRAINING PROGRAM

## 2023-02-22 PROCEDURE — 1159F PR MEDICATION LIST DOCUMENTED IN MEDICAL RECORD: ICD-10-PCS | Mod: CPTII,S$GLB,, | Performed by: STUDENT IN AN ORGANIZED HEALTH CARE EDUCATION/TRAINING PROGRAM

## 2023-02-22 RX ORDER — KETOCONAZOLE 20 MG/ML
SHAMPOO, SUSPENSION TOPICAL
Qty: 120 ML | Refills: 6 | Status: SHIPPED | OUTPATIENT
Start: 2023-02-23

## 2023-02-22 RX ORDER — FLUOCINONIDE TOPICAL SOLUTION USP, 0.05% 0.5 MG/ML
SOLUTION TOPICAL 2 TIMES DAILY
Qty: 60 ML | Refills: 3 | Status: SHIPPED | OUTPATIENT
Start: 2023-02-22 | End: 2024-02-12 | Stop reason: SDUPTHER

## 2023-02-22 NOTE — PATIENT INSTRUCTIONS
- ketoconazole shampoo - apply to scalp twice weekly, lather and let sit for 5 minutes before rinsing   - apply fluocinonide solution twice daily x 2 weeks as needed for itching on scalp    For ears:  - mix ketoconazole cream and hydrocortisone cream and apply in ears twice daily for 7-10 days as needed for itching and flaking    For under breasts:  - mix ketoconazole cream and hydrocortisone cream and apply under breasts nightly for 7-10 days as needed  - use zeasorb AF powder in the morning  - can use vinegar soaks if needed- capful of white vinegar in cup of warm warm water      CRYOSURGERY      Your doctor has used a method called cryosurgery to treat your skin condition. Cryosurgery refers to the use of very cold substances to treat a variety of skin conditions such as warts, pre-skin cancers, molluscum contagiosum, sun spots, and several benign growths. The substance we use in cryosurgery is liquid nitrogen and is so cold (-195 degrees Celsius) that is burns when administered.     Following treatment in the office, the skin may immediately burn and become red. You may find the area around the lesion is affected as well. It is sometimes necessary to treat not only the lesion, but a small area of the surrounding normal skin to achieve a good response.     A blister, and even a blood filled blister, may form after treatment.   This is a normal response. If the blister is painful, it is acceptable to sterilize a needle and with rubbing alcohol and gently pop the blister. It is important that you gently wash the area with soap and warm water as the blister fluid may contain wart virus if a wart was treated. Do no remove the roof of the blister.     The area treated can take anywhere from 1-3 weeks to heal. Healing time depends on the kind skin lesion treated, the location, and how aggressively the lesion was treated. It is recommended that the areas treated are covered with Vaseline or bacitracin ointment and a  band-aid. If a band-aid is not practical, just ointment applied several times per day will do. Keeping these areas moist will speed the healing time.    Treatment with liquid nitrogen can leave a scar. In dark skin, it may be a light or dark scar, in light skin it may be a white or pink scar. These will generally fade with time, but may never go away completely.     If you have any concerns after your treatment, please feel free to call the office.       [facility] [place] [address]/ [phone] FAX/ [email or web address]

## 2023-02-22 NOTE — PROGRESS NOTES
Subjective:       Patient ID:  Sari Biswas is a 82 y.o. female who presents for   Chief Complaint   Patient presents with    Skin Check     TBSE    Spot     Under left breat    Itching     scalp     New Patient    Skin Check - TBSE    C/o spot under left breast  Itchy, worse when sweating, hot and when bra rubs. She uses athlete foot powder under her breast.     C/o itchy scalp. Washes hair once a week. No treatment.     Derm Hx:  Denies phx MM  Denies fhx MM  Previous bx on leg that was cancerous, unsure of leg and type, many years ago    Current Outpatient Medications:   ·  BIOTIN ORAL, Take 1 capsule by mouth once daily. , Disp: , Rfl:   ·  carboxymethylcellulose (REFRESH PLUS) 0.5 % Dpet, 1 drop 3 (three) times daily as needed., Disp: , Rfl:   ·  clobetasol (TEMOVATE) 0.05 % cream, Apply topically 2 (two) times daily., Disp: 60 g, Rfl: 5  ·  clopidogreL (PLAVIX) 75 mg tablet, Take 1 tablet (75 mg total) by mouth once daily., Disp: 90 tablet, Rfl: 3  ·  cranberry 400 mg Cap, Take 1 capsule by mouth once daily. , Disp: , Rfl:   ·  furosemide (LASIX) 20 MG tablet, Take 1 tablet (20 mg total) by mouth once daily., Disp: 90 tablet, Rfl: 3  ·  hydrocortisone 2.5 % cream, APPLY EXTERNALLY TO THE AFFECTED AREA TWICE DAILY FOR 1 WEEK THEN AS NEEDED FOR FLARE UPS, Disp: , Rfl:   ·  ketoconazole (NIZORAL) 2 % cream, APPLY EXTERNALLY TO RASH ONCE TO TWICE DAILY AS DIRECTED, Disp: , Rfl:   ·  metoprolol succinate (TOPROL-XL) 25 MG 24 hr tablet, Take 1 tablet (25 mg total) by mouth once daily., Disp: 30 tablet, Rfl: 11  ·  rivaroxaban (XARELTO) 20 mg Tab, Take 1 tablet (20 mg total) by mouth daily with dinner or evening meal., Disp: 31 tablet, Rfl: 11  ·  rosuvastatin (CRESTOR) 5 MG tablet, Take 1 tablet (5 mg total) by mouth every evening., Disp: 90 tablet, Rfl: 3  ·  sacubitriL-valsartan (ENTRESTO) 24-26 mg per tablet, Take 1 tablet by mouth 2 (two) times daily., Disp: 180 tablet, Rfl: 3  ·  sotaloL (BETAPACE) 80 MG  tablet, Take 1 tablet (80 mg total) by mouth 2 (two) times daily., Disp: 60 tablet, Rfl: 11  ·  spironolactone (ALDACTONE) 25 MG tablet, Take 0.5 tablets (12.5 mg total) by mouth once daily., Disp: 46 tablet, Rfl: 3  ·  varicella-zoster gE-AS01B, PF, (SHINGRIX) 50 mcg/0.5 mL injection, Inject into the muscle., Disp: 1 each, Rfl: 1  ·  vit C,G-Px-gtivs-lutein-zeaxan (PRESERVISION AREDS 2) 250-90-40-1 mg Cap, Take by mouth 2 (two) times daily., Disp: , Rfl:   ·  VITAMIN D2 1,250 mcg (50,000 unit) capsule, take one capsule by mouth three times a week on Monday, Wednesday and Friday, Disp: 36 capsule, Rfl: 3      Review of Systems   Constitutional:  Negative for fever, chills and fatigue.   Respiratory:  Negative for cough and shortness of breath.    Skin:  Positive for activity-related sunscreen use.   Hematologic/Lymphatic: Bruises/bleeds easily (plavix, xarelto).      Objective:    Physical Exam   Constitutional: She appears well-developed and well-nourished. No distress.   Neurological: She is alert and oriented to person, place, and time. She is not disoriented.   Psychiatric: She has a normal mood and affect.   Skin:   Areas Examined (abnormalities noted in diagram):   Scalp / Hair Palpated and Inspected  Head / Face Inspection Performed  Neck Inspection Performed  Chest / Axilla Inspection Performed  Abdomen Inspection Performed  Genitals / Buttocks / Groin Inspection Performed  Back Inspection Performed  RUE Inspected  LUE Inspection Performed  RLE Inspected  LLE Inspection Performed  Nails and Digits Inspection Performed                 Diagram Legend     Erythematous scaling macule/papule c/w actinic keratosis       Vascular papule c/w angioma      Pigmented verrucoid papule/plaque c/w seborrheic keratosis      Yellow umbilicated papule c/w sebaceous hyperplasia      Irregularly shaped tan macule c/w lentigo     1-2 mm smooth white papules consistent with Milia      Movable subcutaneous cyst with punctum c/w  epidermal inclusion cyst      Subcutaneous movable cyst c/w pilar cyst      Firm pink to brown papule c/w dermatofibroma      Pedunculated fleshy papule(s) c/w skin tag(s)      Evenly pigmented macule c/w junctional nevus     Mildly variegated pigmented, slightly irregular-bordered macule c/w mildly atypical nevus      Flesh colored to evenly pigmented papule c/w intradermal nevus       Pink pearly papule/plaque c/w basal cell carcinoma      Erythematous hyperkeratotic cursted plaque c/w SCC      Surgical scar with no sign of skin cancer recurrence      Open and closed comedones      Inflammatory papules and pustules      Verrucoid papule consistent consistent with wart     Erythematous eczematous patches and plaques     Dystrophic onycholytic nail with subungual debris c/w onychomycosis     Umbilicated papule    Erythematous-base heme-crusted tan verrucoid plaque consistent with inflamed seborrheic keratosis     Erythematous Silvery Scaling Plaque c/w Psoriasis     See annotation      Assessment / Plan:        Seborrheic dermatitis  -     ketoconazole (NIZORAL) 2 % shampoo; Apply topically twice a week.  Dispense: 120 mL; Refill: 6  -     fluocinonide (LIDEX) 0.05 % external solution; Apply topically 2 (two) times daily.  Dispense: 60 mL; Refill: 3  - ketoconazole shampoo - apply to scalp twice weekly, lather and let sit for 5 minutes before rinsing   - apply fluocinonide solution twice daily x 2 weeks as needed for itching on scalp    For ears:  - mix ketoconazole cream and hydrocortisone cream and apply in ears twice daily for 7-10 days as needed for itching and flaking    Actinic keratosis- left lower forehead  Cryosurgery Procedure Note    Verbal consent from the patient is obtained and the patient is aware of the precancerous quality and need for treatment of these lesions. Liquid nitrogen cryosurgery is applied to the 1 actinic keratoses, as detailed in the physical exam, to produce a freeze injury. The  patient is aware that blisters may form and is instructed on wound care with gentle cleansing and use of vaseline ointment to keep moist until healed. The patient is supplied a handout on cryosurgery and is instructed to call if lesions do not completely resolve.    Seborrheic keratoses  These are benign inherited growths without a malignant potential. Reassurance given to patient. No treatment is necessary.     Cherry angioma  This is a benign vascular lesion. Reassurance given. No treatment required.     Intertrigo  For under breasts:  - mix ketoconazole cream and hydrocortisone cream and apply under breasts nightly for 7-10 days as needed  - use zeasorb AF powder in the morning  - can use vinegar soaks if needed- capful of white vinegar in cup of warm warm water    Multiple benign nevi  Careful dermoscopy evaluation of nevi performed with none identified as needing biopsy today  Monitor for new mole or moles that are becoming bigger, darker, irritated, or developing irregular borders.   Total body skin examination performed today including at least 12 points as noted in physical examination. No lesions suspicious for malignancy noted.  Patient instructed in importance in daily broad spectrum sun protection of at least spf 30. Mineral sunscreen ingredients preferred (Zinc +/- Titanium) and can be found OTC.   Patient encouraged to wear hat for all outdoor exposure.   Also discussed sun avoidance and use of protective clothing.         1 year    No follow-ups on file.

## 2023-02-24 ENCOUNTER — PES CALL (OUTPATIENT)
Dept: ADMINISTRATIVE | Facility: CLINIC | Age: 83
End: 2023-02-24
Payer: MEDICARE

## 2023-03-16 ENCOUNTER — TELEPHONE (OUTPATIENT)
Dept: CARDIOLOGY | Facility: HOSPITAL | Age: 83
End: 2023-03-16
Payer: MEDICARE

## 2023-03-16 DIAGNOSIS — Z95.810 CARDIAC DEFIBRILLATOR IN SITU: Primary | ICD-10-CM

## 2023-03-16 DIAGNOSIS — I48.0 PAF (PAROXYSMAL ATRIAL FIBRILLATION): ICD-10-CM

## 2023-04-20 ENCOUNTER — CLINICAL SUPPORT (OUTPATIENT)
Dept: CARDIOLOGY | Facility: HOSPITAL | Age: 83
End: 2023-04-20
Attending: INTERNAL MEDICINE
Payer: MEDICARE

## 2023-04-20 DIAGNOSIS — I48.0 PAF (PAROXYSMAL ATRIAL FIBRILLATION): ICD-10-CM

## 2023-04-20 DIAGNOSIS — Z95.810 CARDIAC DEFIBRILLATOR IN SITU: ICD-10-CM

## 2023-04-20 PROCEDURE — 93283 PRGRMG EVAL IMPLANTABLE DFB: CPT | Mod: 26,,, | Performed by: INTERNAL MEDICINE

## 2023-04-20 PROCEDURE — 93283 CARDIAC DEVICE CHECK - IN CLINIC & HOSPITAL: ICD-10-PCS | Mod: 26,,, | Performed by: INTERNAL MEDICINE

## 2023-05-09 ENCOUNTER — CLINICAL SUPPORT (OUTPATIENT)
Dept: CARDIOLOGY | Facility: HOSPITAL | Age: 83
End: 2023-05-09
Payer: MEDICARE

## 2023-05-09 DIAGNOSIS — Z95.810 PRESENCE OF AUTOMATIC (IMPLANTABLE) CARDIAC DEFIBRILLATOR: ICD-10-CM

## 2023-05-09 PROCEDURE — 93295 CARDIAC DEVICE CHECK - REMOTE: ICD-10-PCS | Mod: ,,, | Performed by: INTERNAL MEDICINE

## 2023-05-09 PROCEDURE — 93296 REM INTERROG EVL PM/IDS: CPT | Mod: PO | Performed by: INTERNAL MEDICINE

## 2023-05-09 PROCEDURE — 93295 DEV INTERROG REMOTE 1/2/MLT: CPT | Mod: ,,, | Performed by: INTERNAL MEDICINE

## 2023-05-24 ENCOUNTER — TELEPHONE (OUTPATIENT)
Dept: OPHTHALMOLOGY | Facility: CLINIC | Age: 83
End: 2023-05-24
Payer: MEDICARE

## 2023-05-24 NOTE — TELEPHONE ENCOUNTER
Attempted to reach pt again by phone.  Lvm she will see Dr. Franklin on 5/30/23 appt.  He is leaving 6/2.  Asked pt again to please send a message. I will send her a portal message also.         ----- Message from Lizbet Dao sent at 5/24/2023 11:01 AM CDT -----  Regarding: return halley  Contact: PAtient  Type:  Patient Returning Call    Who Called:  Patient   Who Left Message for Patient:    Does the patient know what this is regarding?:    Best Call Back Number:  [phone] (home)     Additional Information:  Patient stated that she missed a call from doctor's office and would like to return call. Please contact patient to advise. Thanks!

## 2023-05-24 NOTE — TELEPHONE ENCOUNTER
Attempted to reach pt again re: pt request rescheduling       ----- Message from Shantel Mcadams sent at 5/24/2023 10:44 AM CDT -----  Regarding: Sooner Appointment Request  Contact: patient at [phone]  Type:  Sooner Appointment Request    Caller is requesting a sooner appointment.      Name of Caller:  patient    Best Call Back Number:  [phone]    Additional Information:  patient is calling to get her appointment rescheduled since doctor is leaving. Please call and advise. Thank you

## 2023-05-29 ENCOUNTER — OFFICE VISIT (OUTPATIENT)
Dept: FAMILY MEDICINE | Facility: CLINIC | Age: 83
End: 2023-05-29
Payer: MEDICARE

## 2023-05-29 VITALS
HEIGHT: 65 IN | SYSTOLIC BLOOD PRESSURE: 92 MMHG | WEIGHT: 180.56 LBS | DIASTOLIC BLOOD PRESSURE: 58 MMHG | BODY MASS INDEX: 30.08 KG/M2 | HEART RATE: 62 BPM

## 2023-05-29 DIAGNOSIS — I48.0 PAROXYSMAL ATRIAL FIBRILLATION: ICD-10-CM

## 2023-05-29 DIAGNOSIS — J31.0 CHRONIC RHINITIS: ICD-10-CM

## 2023-05-29 DIAGNOSIS — E55.9 VITAMIN D DEFICIENCY: ICD-10-CM

## 2023-05-29 DIAGNOSIS — N18.31 CHRONIC KIDNEY DISEASE, STAGE 3A: ICD-10-CM

## 2023-05-29 DIAGNOSIS — I70.0 ATHEROSCLEROSIS OF AORTA: ICD-10-CM

## 2023-05-29 DIAGNOSIS — I10 ESSENTIAL HYPERTENSION: Primary | ICD-10-CM

## 2023-05-29 DIAGNOSIS — I50.42 CHRONIC COMBINED SYSTOLIC AND DIASTOLIC HEART FAILURE: ICD-10-CM

## 2023-05-29 PROCEDURE — 1159F PR MEDICATION LIST DOCUMENTED IN MEDICAL RECORD: ICD-10-PCS | Mod: CPTII,S$GLB,, | Performed by: FAMILY MEDICINE

## 2023-05-29 PROCEDURE — 99214 PR OFFICE/OUTPT VISIT, EST, LEVL IV, 30-39 MIN: ICD-10-PCS | Mod: S$GLB,,, | Performed by: FAMILY MEDICINE

## 2023-05-29 PROCEDURE — 1159F MED LIST DOCD IN RCRD: CPT | Mod: CPTII,S$GLB,, | Performed by: FAMILY MEDICINE

## 2023-05-29 PROCEDURE — 99999 PR PBB SHADOW E&M-EST. PATIENT-LVL III: ICD-10-PCS | Mod: PBBFAC,,, | Performed by: FAMILY MEDICINE

## 2023-05-29 PROCEDURE — 3288F PR FALLS RISK ASSESSMENT DOCUMENTED: ICD-10-PCS | Mod: CPTII,S$GLB,, | Performed by: FAMILY MEDICINE

## 2023-05-29 PROCEDURE — 1160F PR REVIEW ALL MEDS BY PRESCRIBER/CLIN PHARMACIST DOCUMENTED: ICD-10-PCS | Mod: CPTII,S$GLB,, | Performed by: FAMILY MEDICINE

## 2023-05-29 PROCEDURE — 99999 PR PBB SHADOW E&M-EST. PATIENT-LVL III: CPT | Mod: PBBFAC,,, | Performed by: FAMILY MEDICINE

## 2023-05-29 PROCEDURE — 99214 OFFICE O/P EST MOD 30 MIN: CPT | Mod: S$GLB,,, | Performed by: FAMILY MEDICINE

## 2023-05-29 PROCEDURE — 1126F AMNT PAIN NOTED NONE PRSNT: CPT | Mod: CPTII,S$GLB,, | Performed by: FAMILY MEDICINE

## 2023-05-29 PROCEDURE — 3078F PR MOST RECENT DIASTOLIC BLOOD PRESSURE < 80 MM HG: ICD-10-PCS | Mod: CPTII,S$GLB,, | Performed by: FAMILY MEDICINE

## 2023-05-29 PROCEDURE — 3074F PR MOST RECENT SYSTOLIC BLOOD PRESSURE < 130 MM HG: ICD-10-PCS | Mod: CPTII,S$GLB,, | Performed by: FAMILY MEDICINE

## 2023-05-29 PROCEDURE — 1126F PR PAIN SEVERITY QUANTIFIED, NO PAIN PRESENT: ICD-10-PCS | Mod: CPTII,S$GLB,, | Performed by: FAMILY MEDICINE

## 2023-05-29 PROCEDURE — 1160F RVW MEDS BY RX/DR IN RCRD: CPT | Mod: CPTII,S$GLB,, | Performed by: FAMILY MEDICINE

## 2023-05-29 PROCEDURE — 3288F FALL RISK ASSESSMENT DOCD: CPT | Mod: CPTII,S$GLB,, | Performed by: FAMILY MEDICINE

## 2023-05-29 PROCEDURE — 3078F DIAST BP <80 MM HG: CPT | Mod: CPTII,S$GLB,, | Performed by: FAMILY MEDICINE

## 2023-05-29 PROCEDURE — 1101F PT FALLS ASSESS-DOCD LE1/YR: CPT | Mod: CPTII,S$GLB,, | Performed by: FAMILY MEDICINE

## 2023-05-29 PROCEDURE — 1101F PR PT FALLS ASSESS DOC 0-1 FALLS W/OUT INJ PAST YR: ICD-10-PCS | Mod: CPTII,S$GLB,, | Performed by: FAMILY MEDICINE

## 2023-05-29 PROCEDURE — 3074F SYST BP LT 130 MM HG: CPT | Mod: CPTII,S$GLB,, | Performed by: FAMILY MEDICINE

## 2023-05-29 RX ORDER — ERGOCALCIFEROL 1.25 MG/1
CAPSULE ORAL
Qty: 24 CAPSULE | Refills: 3 | Status: SHIPPED | OUTPATIENT
Start: 2023-05-29

## 2023-05-29 RX ORDER — AZELASTINE 1 MG/ML
1 SPRAY, METERED NASAL 2 TIMES DAILY
Qty: 30 ML | Refills: 5 | Status: SHIPPED | OUTPATIENT
Start: 2023-05-29 | End: 2023-11-08

## 2023-05-29 NOTE — PROGRESS NOTES
Subjective:       Patient ID: Sari Biswas is a 83 y.o. female.    Chief Complaint: Hypertension    HPI    Here for a f/u    paf stable. sees cardio     hld stable.      On vit d2 for vitamin d deficiency    Aorta atherosclerosis monitored and stable    Ckd stage 3 monitored and stable    Feeling sometimes overwhelmed and anxious by taking care of her ailing .     Requesting astelin nasal spray for chronic rhinitis.         Objective:      Physical Exam  Constitutional:       Appearance: She is well-developed.   HENT:      Head: Normocephalic and atraumatic.   Eyes:      Conjunctiva/sclera: Conjunctivae normal.      Pupils: Pupils are equal, round, and reactive to light.   Cardiovascular:      Rate and Rhythm: Normal rate and regular rhythm.      Heart sounds: No murmur heard.  Pulmonary:      Effort: Pulmonary effort is normal.      Breath sounds: Normal breath sounds.   Musculoskeletal:      Cervical back: Normal range of motion.   Lymphadenopathy:      Cervical: No cervical adenopathy.       Assessment:       1. Essential hypertension    2. Chronic combined systolic and diastolic heart failure    3. Paroxysmal atrial fibrillation    4. Vitamin D deficiency    5. Chronic rhinitis    6. Chronic kidney disease, stage 3a    7. Atherosclerosis of aorta        Plan:       Essential hypertension  -     Comprehensive Metabolic Panel; Future  -     Lipid Panel; Future  -     CBC Auto Differential; Future    Chronic combined systolic and diastolic heart failure  -     B-TYPE NATRIURETIC PEPTIDE; Future; Expected date: 05/29/2023    Paroxysmal atrial fibrillation    Vitamin D deficiency    Chronic rhinitis    Chronic kidney disease, stage 3a    Atherosclerosis of aorta    Other orders  -     VITAMIN D2 1,250 mcg (50,000 unit) capsule; Take one capsule by mouth two times a week on Monday and Friday  Dispense: 24 capsule; Refill: 3  -     azelastine (ASTELIN) 137 mcg (0.1 %) nasal spray; Instill 1 spray (137 mcg  total) by Nasal route 2 (two) times daily.  Dispense: 30 mL; Refill: 5              Plan:  See orders  Start astelin for rhinitis  Cont all other meds  F/u in 1 year      Medication List with Changes/Refills   New Medications    AZELASTINE (ASTELIN) 137 MCG (0.1 %) NASAL SPRAY    Instill 1 spray (137 mcg total) by Nasal route 2 (two) times daily.   Current Medications    BIOTIN ORAL    Take 1 capsule by mouth once daily.     CARBOXYMETHYLCELLULOSE (REFRESH PLUS) 0.5 % DPET    1 drop 3 (three) times daily as needed.    CLOPIDOGREL (PLAVIX) 75 MG TABLET    Take 1 tablet (75 mg total) by mouth once daily.    CRANBERRY 400 MG CAP    Take 1 capsule by mouth once daily.     FLUOCINONIDE (LIDEX) 0.05 % EXTERNAL SOLUTION    Apply topically 2 (two) times daily.    FUROSEMIDE (LASIX) 20 MG TABLET    Take 1 tablet (20 mg total) by mouth once daily.    HYDROCORTISONE 2.5 % CREAM    APPLY EXTERNALLY TO THE AFFECTED AREA TWICE DAILY FOR 1 WEEK THEN AS NEEDED FOR FLARE UPS    KETOCONAZOLE (NIZORAL) 2 % CREAM    APPLY EXTERNALLY TO RASH ONCE TO TWICE DAILY AS DIRECTED    KETOCONAZOLE (NIZORAL) 2 % SHAMPOO    Apply topically twice a week.    METOPROLOL SUCCINATE (TOPROL-XL) 25 MG 24 HR TABLET    Take 1 tablet (25 mg total) by mouth once daily.    RIVAROXABAN (XARELTO) 20 MG TAB    Take 1 tablet (20 mg total) by mouth daily with dinner or evening meal.    ROSUVASTATIN (CRESTOR) 5 MG TABLET    Take 1 tablet (5 mg total) by mouth every evening.    SACUBITRIL-VALSARTAN (ENTRESTO) 24-26 MG PER TABLET    Take 1 tablet by mouth 2 (two) times daily.    SOTALOL (BETAPACE) 80 MG TABLET    Take 1 tablet (80 mg total) by mouth 2 (two) times daily.    SPIRONOLACTONE (ALDACTONE) 25 MG TABLET    Take 0.5 tablets (12.5 mg total) by mouth once daily.    VARICELLA-ZOSTER GE-AS01B, PF, (SHINGRIX) 50 MCG/0.5 ML INJECTION    Inject into the muscle.    VIT C,X-BB-KAZHT-LUTEIN-ZEAXAN (PRESERVISION AREDS 2) 250-90-40-1 MG CAP    Take by mouth 2 (two) times  daily.   Changed and/or Refilled Medications    Modified Medication Previous Medication    VITAMIN D2 1,250 MCG (50,000 UNIT) CAPSULE VITAMIN D2 1,250 mcg (50,000 unit) capsule       Take one capsule by mouth two times a week on Monday and Friday    take one capsule by mouth three times a week on Monday, Wednesday and Friday   Discontinued Medications    CLOBETASOL (TEMOVATE) 0.05 % CREAM    Apply topically 2 (two) times daily.

## 2023-06-02 ENCOUNTER — OFFICE VISIT (OUTPATIENT)
Dept: OPHTHALMOLOGY | Facility: CLINIC | Age: 83
End: 2023-06-02
Payer: MEDICARE

## 2023-06-02 DIAGNOSIS — Z96.1 PSEUDOPHAKIA OF BOTH EYES: ICD-10-CM

## 2023-06-02 DIAGNOSIS — H35.3133 ADVANCED ATROPHIC NONEXUDATIVE AGE-RELATED MACULAR DEGENERATION OF BOTH EYES WITHOUT SUBFOVEAL INVOLVEMENT: Primary | ICD-10-CM

## 2023-06-02 PROCEDURE — 1159F MED LIST DOCD IN RCRD: CPT | Mod: CPTII,S$GLB,, | Performed by: OPHTHALMOLOGY

## 2023-06-02 PROCEDURE — 3288F PR FALLS RISK ASSESSMENT DOCUMENTED: ICD-10-PCS | Mod: CPTII,S$GLB,, | Performed by: OPHTHALMOLOGY

## 2023-06-02 PROCEDURE — 1101F PR PT FALLS ASSESS DOC 0-1 FALLS W/OUT INJ PAST YR: ICD-10-PCS | Mod: CPTII,S$GLB,, | Performed by: OPHTHALMOLOGY

## 2023-06-02 PROCEDURE — 92134 CPTRZ OPH DX IMG PST SGM RTA: CPT | Mod: S$GLB,,, | Performed by: OPHTHALMOLOGY

## 2023-06-02 PROCEDURE — 92134 POSTERIOR SEGMENT OCT RETINA (OCULAR COHERENCE TOMOGRAPHY)-BOTH EYES: ICD-10-PCS | Mod: S$GLB,,, | Performed by: OPHTHALMOLOGY

## 2023-06-02 PROCEDURE — 1101F PT FALLS ASSESS-DOCD LE1/YR: CPT | Mod: CPTII,S$GLB,, | Performed by: OPHTHALMOLOGY

## 2023-06-02 PROCEDURE — 1160F RVW MEDS BY RX/DR IN RCRD: CPT | Mod: CPTII,S$GLB,, | Performed by: OPHTHALMOLOGY

## 2023-06-02 PROCEDURE — 99999 PR PBB SHADOW E&M-EST. PATIENT-LVL III: CPT | Mod: PBBFAC,,, | Performed by: OPHTHALMOLOGY

## 2023-06-02 PROCEDURE — 99999 PR PBB SHADOW E&M-EST. PATIENT-LVL III: ICD-10-PCS | Mod: PBBFAC,,, | Performed by: OPHTHALMOLOGY

## 2023-06-02 PROCEDURE — 1159F PR MEDICATION LIST DOCUMENTED IN MEDICAL RECORD: ICD-10-PCS | Mod: CPTII,S$GLB,, | Performed by: OPHTHALMOLOGY

## 2023-06-02 PROCEDURE — 92014 COMPRE OPH EXAM EST PT 1/>: CPT | Mod: S$GLB,,, | Performed by: OPHTHALMOLOGY

## 2023-06-02 PROCEDURE — 1126F PR PAIN SEVERITY QUANTIFIED, NO PAIN PRESENT: ICD-10-PCS | Mod: CPTII,S$GLB,, | Performed by: OPHTHALMOLOGY

## 2023-06-02 PROCEDURE — 3288F FALL RISK ASSESSMENT DOCD: CPT | Mod: CPTII,S$GLB,, | Performed by: OPHTHALMOLOGY

## 2023-06-02 PROCEDURE — 1126F AMNT PAIN NOTED NONE PRSNT: CPT | Mod: CPTII,S$GLB,, | Performed by: OPHTHALMOLOGY

## 2023-06-02 PROCEDURE — 92014 PR EYE EXAM, EST PATIENT,COMPREHESV: ICD-10-PCS | Mod: S$GLB,,, | Performed by: OPHTHALMOLOGY

## 2023-06-02 PROCEDURE — 1160F PR REVIEW ALL MEDS BY PRESCRIBER/CLIN PHARMACIST DOCUMENTED: ICD-10-PCS | Mod: CPTII,S$GLB,, | Performed by: OPHTHALMOLOGY

## 2023-06-02 NOTE — PROGRESS NOTES
HPI    DLS 11/23/2022  6 mo ARMD     pT states she tried some new tears (Refresh Optive) and they make her   blurred.  VA OU is otherwise good.      Areds 2  OTC chewable;  wants to discuss types  they are free through   [facility]   Last edited by Rebecca Ingram on 6/2/2023 10:41 AM.        ROS    Negative for: Constitutional, Gastrointestinal, Neurological, Skin,   Genitourinary, Musculoskeletal, HENT, Endocrine, Cardiovascular, Eyes,   Respiratory, Psychiatric, Allergic/Imm, Heme/Lymph  Last edited by Tereso Franklin Jr., MD on 6/2/2023 11:02 AM.        Assessment /Plan     For exam results, see Encounter Report.    Advanced atrophic nonexudative age-related macular degeneration of both eyes without subfoveal involvement    Pseudophakia of both eyes      OCT MAC hard and soft drusen  Continue AREDS2 BID   A[facility]ler monitoring OU  Continue current glass rx  No follow-ups on file.

## 2023-07-08 DIAGNOSIS — I25.10 CORONARY ARTERY DISEASE INVOLVING NATIVE CORONARY ARTERY OF NATIVE HEART WITHOUT ANGINA PECTORIS: ICD-10-CM

## 2023-07-10 RX ORDER — CLOPIDOGREL BISULFATE 75 MG/1
75 TABLET ORAL DAILY
Qty: 90 TABLET | Refills: 3 | Status: SHIPPED | OUTPATIENT
Start: 2023-07-10

## 2023-07-11 ENCOUNTER — CLINICAL SUPPORT (OUTPATIENT)
Dept: CARDIOLOGY | Facility: HOSPITAL | Age: 83
End: 2023-07-11
Attending: INTERNAL MEDICINE
Payer: MEDICARE

## 2023-07-11 VITALS — HEART RATE: 66 BPM | BODY MASS INDEX: 29.99 KG/M2 | WEIGHT: 180 LBS | HEIGHT: 65 IN

## 2023-07-11 DIAGNOSIS — Z95.1 S/P CABG (CORONARY ARTERY BYPASS GRAFT): ICD-10-CM

## 2023-07-11 LAB
ASCENDING AORTA: 2.14 CM
AV INDEX (PROSTH): 0.67
AV MEAN GRADIENT: 3 MMHG
AV PEAK GRADIENT: 7 MMHG
AV REGURGITATION PRESSURE HALF TIME: 673.59 MS
AV VALVE AREA: 2.03 CM2
AV VELOCITY RATIO: 0.79
BSA FOR ECHO PROCEDURE: 1.93 M2
CV ECHO LV RWT: 0.5 CM
DOP CALC AO PEAK VEL: 1.29 M/S
DOP CALC AO VTI: 33.5 CM
DOP CALC LVOT AREA: 3 CM2
DOP CALC LVOT DIAMETER: 1.96 CM
DOP CALC LVOT PEAK VEL: 1.02 M/S
DOP CALC LVOT STROKE VOLUME: 68.15 CM3
DOP CALCLVOT PEAK VEL VTI: 22.6 CM
E WAVE DECELERATION TIME: 276.01 MSEC
E/A RATIO: 0.7
E/E' RATIO: 11.5 M/S
ECHO LV POSTERIOR WALL: 1.24 CM (ref 0.6–1.1)
EJECTION FRACTION: 35 %
FRACTIONAL SHORTENING: 28 % (ref 28–44)
INTERVENTRICULAR SEPTUM: 1.24 CM (ref 0.6–1.1)
IVRT: 142.72 MSEC
LA MAJOR: 5.36 CM
LA MINOR: 5.48 CM
LA WIDTH: 4.3 CM
LEFT ATRIUM SIZE: 3.64 CM
LEFT ATRIUM VOLUME INDEX: 38.1 ML/M2
LEFT ATRIUM VOLUME: 72.1 CM3
LEFT INTERNAL DIMENSION IN SYSTOLE: 3.6 CM (ref 2.1–4)
LEFT VENTRICLE DIASTOLIC VOLUME INDEX: 62.47 ML/M2
LEFT VENTRICLE DIASTOLIC VOLUME: 118.06 ML
LEFT VENTRICLE MASS INDEX: 130 G/M2
LEFT VENTRICLE SYSTOLIC VOLUME INDEX: 28.8 ML/M2
LEFT VENTRICLE SYSTOLIC VOLUME: 54.5 ML
LEFT VENTRICULAR INTERNAL DIMENSION IN DIASTOLE: 5 CM (ref 3.5–6)
LEFT VENTRICULAR MASS: 244.8 G
LV LATERAL E/E' RATIO: 9.86 M/S
LV SEPTAL E/E' RATIO: 13.8 M/S
LVOT MG: 2.67 MMHG
LVOT MV: 0.78 CM/S
MV PEAK A VEL: 0.98 M/S
MV PEAK E VEL: 0.69 M/S
MV STENOSIS PRESSURE HALF TIME: 80.04 MS
MV VALVE AREA P 1/2 METHOD: 2.75 CM2
PISA AR MAX VEL: 2.41 M/S
PISA MRMAX VEL: 4.06 M/S
PISA TR MAX VEL: 2.76 M/S
PULM VEIN S/D RATIO: 1.75
PV PEAK D VEL: 0.36 M/S
PV PEAK S VEL: 0.63 M/S
RA MAJOR: 5.44 CM
RA PRESSURE: 3 MMHG
RA WIDTH: 4.3 CM
RIGHT VENTRICULAR END-DIASTOLIC DIMENSION: 4.48 CM
RIGHT VENTRICULAR LENGTH IN DIASTOLE (APICAL 4-CHAMBER VIEW): 5.21 CM
RV MID DIAMA: 4.04 CM
RV TISSUE DOPPLER FREE WALL SYSTOLIC VELOCITY 1 (APICAL 4 CHAMBER VIEW): 7.89 CM/S
SINUS: 2.95 CM
STJ: 1.96 CM
TDI LATERAL: 0.07 M/S
TDI SEPTAL: 0.05 M/S
TDI: 0.06 M/S
TR MAX PG: 30 MMHG
TRICUSPID ANNULAR PLANE SYSTOLIC EXCURSION: 1.36 CM
TV REST PULMONARY ARTERY PRESSURE: 33 MMHG

## 2023-07-11 PROCEDURE — 93306 TTE W/DOPPLER COMPLETE: CPT | Mod: PO

## 2023-07-11 PROCEDURE — 93306 TTE W/DOPPLER COMPLETE: CPT | Mod: 26,,, | Performed by: INTERNAL MEDICINE

## 2023-07-11 PROCEDURE — 93306 ECHO (CUPID ONLY): ICD-10-PCS | Mod: 26,,, | Performed by: INTERNAL MEDICINE

## 2023-07-12 ENCOUNTER — LAB VISIT (OUTPATIENT)
Dept: LAB | Facility: HOSPITAL | Age: 83
End: 2023-07-12
Attending: FAMILY MEDICINE
Payer: MEDICARE

## 2023-07-12 DIAGNOSIS — I50.42 CHRONIC COMBINED SYSTOLIC AND DIASTOLIC HEART FAILURE: ICD-10-CM

## 2023-07-12 DIAGNOSIS — I10 ESSENTIAL HYPERTENSION: ICD-10-CM

## 2023-07-12 LAB
ALBUMIN SERPL BCP-MCNC: 3.6 G/DL (ref 3.5–5.2)
ALP SERPL-CCNC: 75 U/L (ref 55–135)
ALT SERPL W/O P-5'-P-CCNC: 12 U/L (ref 10–44)
ANION GAP SERPL CALC-SCNC: 7 MMOL/L (ref 8–16)
AST SERPL-CCNC: 19 U/L (ref 10–40)
BASOPHILS # BLD AUTO: 0.02 K/UL (ref 0–0.2)
BASOPHILS NFR BLD: 0.4 % (ref 0–1.9)
BILIRUB SERPL-MCNC: 0.7 MG/DL (ref 0.1–1)
BNP SERPL-MCNC: 334 PG/ML (ref 0–99)
BUN SERPL-MCNC: 28 MG/DL (ref 8–23)
CALCIUM SERPL-MCNC: 9 MG/DL (ref 8.7–10.5)
CHLORIDE SERPL-SCNC: 106 MMOL/L (ref 95–110)
CHOLEST SERPL-MCNC: 146 MG/DL (ref 120–199)
CHOLEST/HDLC SERPL: 2.9 {RATIO} (ref 2–5)
CO2 SERPL-SCNC: 26 MMOL/L (ref 23–29)
CREAT SERPL-MCNC: 1.1 MG/DL (ref 0.5–1.4)
DIFFERENTIAL METHOD: ABNORMAL
EOSINOPHIL # BLD AUTO: 0.3 K/UL (ref 0–0.5)
EOSINOPHIL NFR BLD: 5.7 % (ref 0–8)
ERYTHROCYTE [DISTWIDTH] IN BLOOD BY AUTOMATED COUNT: 13.1 % (ref 11.5–14.5)
EST. GFR  (NO RACE VARIABLE): 49.5 ML/MIN/1.73 M^2
GLUCOSE SERPL-MCNC: 92 MG/DL (ref 70–110)
HCT VFR BLD AUTO: 41 % (ref 37–48.5)
HDLC SERPL-MCNC: 50 MG/DL (ref 40–75)
HDLC SERPL: 34.2 % (ref 20–50)
HGB BLD-MCNC: 13.3 G/DL (ref 12–16)
IMM GRANULOCYTES # BLD AUTO: 0.01 K/UL (ref 0–0.04)
IMM GRANULOCYTES NFR BLD AUTO: 0.2 % (ref 0–0.5)
LDLC SERPL CALC-MCNC: 75.8 MG/DL (ref 63–159)
LYMPHOCYTES # BLD AUTO: 1 K/UL (ref 1–4.8)
LYMPHOCYTES NFR BLD: 21.2 % (ref 18–48)
MCH RBC QN AUTO: 32 PG (ref 27–31)
MCHC RBC AUTO-ENTMCNC: 32.4 G/DL (ref 32–36)
MCV RBC AUTO: 99 FL (ref 82–98)
MONOCYTES # BLD AUTO: 0.5 K/UL (ref 0.3–1)
MONOCYTES NFR BLD: 10 % (ref 4–15)
NEUTROPHILS # BLD AUTO: 3 K/UL (ref 1.8–7.7)
NEUTROPHILS NFR BLD: 62.5 % (ref 38–73)
NONHDLC SERPL-MCNC: 96 MG/DL
NRBC BLD-RTO: 0 /100 WBC
PLATELET # BLD AUTO: 170 K/UL (ref 150–450)
PMV BLD AUTO: 10.7 FL (ref 9.2–12.9)
POTASSIUM SERPL-SCNC: 4.6 MMOL/L (ref 3.5–5.1)
PROT SERPL-MCNC: 6.8 G/DL (ref 6–8.4)
RBC # BLD AUTO: 4.15 M/UL (ref 4–5.4)
SODIUM SERPL-SCNC: 139 MMOL/L (ref 136–145)
TRIGL SERPL-MCNC: 101 MG/DL (ref 30–150)
WBC # BLD AUTO: 4.72 K/UL (ref 3.9–12.7)

## 2023-07-12 PROCEDURE — 85025 COMPLETE CBC W/AUTO DIFF WBC: CPT | Performed by: FAMILY MEDICINE

## 2023-07-12 PROCEDURE — 80053 COMPREHEN METABOLIC PANEL: CPT | Performed by: FAMILY MEDICINE

## 2023-07-12 PROCEDURE — 83880 ASSAY OF NATRIURETIC PEPTIDE: CPT | Performed by: FAMILY MEDICINE

## 2023-07-12 PROCEDURE — 36415 COLL VENOUS BLD VENIPUNCTURE: CPT | Mod: PO | Performed by: FAMILY MEDICINE

## 2023-07-12 PROCEDURE — 80061 LIPID PANEL: CPT | Performed by: FAMILY MEDICINE

## 2023-07-18 ENCOUNTER — OFFICE VISIT (OUTPATIENT)
Dept: CARDIOLOGY | Facility: CLINIC | Age: 83
End: 2023-07-18
Payer: MEDICARE

## 2023-07-18 VITALS
HEIGHT: 65 IN | HEART RATE: 81 BPM | WEIGHT: 182.13 LBS | BODY MASS INDEX: 30.34 KG/M2 | SYSTOLIC BLOOD PRESSURE: 118 MMHG | DIASTOLIC BLOOD PRESSURE: 68 MMHG

## 2023-07-18 DIAGNOSIS — E78.5 HYPERLIPIDEMIA, UNSPECIFIED HYPERLIPIDEMIA TYPE: ICD-10-CM

## 2023-07-18 DIAGNOSIS — I48.0 PAROXYSMAL ATRIAL FIBRILLATION: ICD-10-CM

## 2023-07-18 DIAGNOSIS — Z95.810 ICD (IMPLANTABLE CARDIOVERTER-DEFIBRILLATOR) IN PLACE: ICD-10-CM

## 2023-07-18 DIAGNOSIS — I10 PRIMARY HYPERTENSION: ICD-10-CM

## 2023-07-18 DIAGNOSIS — I50.43 ACUTE ON CHRONIC COMBINED SYSTOLIC (CONGESTIVE) AND DIASTOLIC (CONGESTIVE) HEART FAILURE: ICD-10-CM

## 2023-07-18 DIAGNOSIS — I25.10 CORONARY ARTERY DISEASE INVOLVING NATIVE CORONARY ARTERY OF NATIVE HEART WITHOUT ANGINA PECTORIS: ICD-10-CM

## 2023-07-18 PROCEDURE — 3078F DIAST BP <80 MM HG: CPT | Mod: CPTII,S$GLB,,

## 2023-07-18 PROCEDURE — 3288F PR FALLS RISK ASSESSMENT DOCUMENTED: ICD-10-PCS | Mod: CPTII,S$GLB,,

## 2023-07-18 PROCEDURE — 3078F PR MOST RECENT DIASTOLIC BLOOD PRESSURE < 80 MM HG: ICD-10-PCS | Mod: CPTII,S$GLB,,

## 2023-07-18 PROCEDURE — 99214 PR OFFICE/OUTPT VISIT, EST, LEVL IV, 30-39 MIN: ICD-10-PCS | Mod: S$GLB,,,

## 2023-07-18 PROCEDURE — 99214 OFFICE O/P EST MOD 30 MIN: CPT | Mod: S$GLB,,,

## 2023-07-18 PROCEDURE — 3074F SYST BP LT 130 MM HG: CPT | Mod: CPTII,S$GLB,,

## 2023-07-18 PROCEDURE — 3288F FALL RISK ASSESSMENT DOCD: CPT | Mod: CPTII,S$GLB,,

## 2023-07-18 PROCEDURE — 1126F PR PAIN SEVERITY QUANTIFIED, NO PAIN PRESENT: ICD-10-PCS | Mod: CPTII,S$GLB,,

## 2023-07-18 PROCEDURE — 1159F PR MEDICATION LIST DOCUMENTED IN MEDICAL RECORD: ICD-10-PCS | Mod: CPTII,S$GLB,,

## 2023-07-18 PROCEDURE — 99999 PR PBB SHADOW E&M-EST. PATIENT-LVL III: ICD-10-PCS | Mod: PBBFAC,,,

## 2023-07-18 PROCEDURE — 1101F PT FALLS ASSESS-DOCD LE1/YR: CPT | Mod: CPTII,S$GLB,,

## 2023-07-18 PROCEDURE — 1101F PR PT FALLS ASSESS DOC 0-1 FALLS W/OUT INJ PAST YR: ICD-10-PCS | Mod: CPTII,S$GLB,,

## 2023-07-18 PROCEDURE — 99999 PR PBB SHADOW E&M-EST. PATIENT-LVL III: CPT | Mod: PBBFAC,,,

## 2023-07-18 PROCEDURE — 1126F AMNT PAIN NOTED NONE PRSNT: CPT | Mod: CPTII,S$GLB,,

## 2023-07-18 PROCEDURE — 1159F MED LIST DOCD IN RCRD: CPT | Mod: CPTII,S$GLB,,

## 2023-07-18 PROCEDURE — 3074F PR MOST RECENT SYSTOLIC BLOOD PRESSURE < 130 MM HG: ICD-10-PCS | Mod: CPTII,S$GLB,,

## 2023-07-18 NOTE — PROGRESS NOTES
Subjective:    Patient ID:  Sari Biswas is a 83 y.o. female patient here for evaluation No chief complaint on file.    History of Present Illness:     Mrs. Guo is an 83 year old F who follows with Dr. Singleton here today for a six month follow up. She has been feeling well- some swelling in her legs towards the end of the day. Continues to bruise easily even with reduced Plavix 4x weekly. No humberto bleeding issues. No chest pain or COMBS. No syncope. Occ dizziness if stands up too quickly.           Most Recent Echocardiogram Results  Results for orders placed in visit on 07/11/23    Echo    Interpretation Summary  · The left ventricle is normal in size with mild concentric hypertrophy and moderately decreased systolic function.  · The estimated ejection fraction is 35%.  · Grade I left ventricular diastolic dysfunction.  · Normal right ventricular size with normal right ventricular systolic function.  · Mild mitral regurgitation.  · Mild to moderate tricuspid regurgitation.  · Normal central venous pressure (3 mmHg).  · The estimated PA systolic pressure is 33 mmHg.      Most Recent Nuclear Stress Test Results  No results found for this or any previous visit.      Most Recent Cardiac PET Stress Test Results  No results found for this or any previous visit.      Most Recent Cardiovascular Angiogram results  Results for orders placed during the hospital encounter of 07/11/18    Cardiac catheterization    Narrative  · Female patient 78 years old who has multivessel coronary disease including distal left main with heavy calcification and the LAD diagonal bifurcation with heavy calcification. Symptomatic angina.  · Given option for surgical revascularization after diagnostic procedure. Lengthy discussion with patient and  in the office explaining her syntax score. Her syntax score was around 27. This shows more favorable outcome with surgical revascularization. Patient was not interested in surgical  revascularization or getting a surgical opinion. She and sanjuanita declined surgical consult.  · Explained to her the complexity of coronary intervention including the possible need for mechanical circulatory support. Also explained the risk of procedure including rotational atherectomy which would be necessary for complete revascularization.  · Again all of this is explained to the patient and her  in an office meeting that occurred about a week or so ago. We gave the patient the option of having this done early in the morning at another facility. However the patient's  was not able to drive to that facility to the procedure was scheduled at [facility] in the afternoon on today.  · Patient the catheterization lab in fasting state. Anesthesia sedated her for the procedure given the complexity but was necessary. We accessed the radial artery on the right and the femoral artery on the right. Ultrasound guidance was used for femoral artery access on the right. A 6 Kazakh sheath was placed in anticipation of possible impeller need  · He is in a 7 Kazakh system in the right radial artery after 80 units per kilogram heparin was given 2000 which through the radial cocktail a CLS 3.5 guiding catheter was used to engage left main  · Angiography demonstrates distal left main disease which is confirmed by intravascular ultrasound and angiographic assessment done about 2 weeks ago. Left main into LAD not involving the ostial circumflex. Again that was confirmed by intravascular ultrasound  · There was an LAD diagonal bifurcation lesion have a calcified. More calcification in the LAD than in the diagonal  · Using an over-the-wire system with advanced a floppy Rotablator wire into the distal LAD. We used a soft wire and then an over-the-wire balloon to exchanged out for the floppy rotational atherectomy wire. The balloon was withdrawn. We did rotational atherectomy of the mid LAD and the distal left main  into the proximal LAD. 2 separate runs for about 30 seconds at a 6 cyclic speed of 160,000revoutions.  · No hemodynamic embarrassment at this point. Blood pressure remained stable. ACT came back well above 250 seconds.  · We did give one more thousand units of heparin during the procedure when the ACT came back at around 210-220. I did we would be doing the intervention for a little while longer 70 to the ACT a little higher.  · Once rotational atherectomy was done we advanced 2 separate wires into the LAD and diagonal respectively. A  50 the diagonal and a whisper extra-support the LAD.  · Our intention was to do a crush technique. We we advanced a 3.5 x 38 stent into the LAD but it was not able to go far enough. Furthermore once LAD stent was in position we were not able to get her diagonal stent in.  · Therefore we withdrew the LAD stents leaving our wire in the LAD and diagonal. We did balloon angioplasty of the ostial diagonal with a 3.5 and 4.0 noncompliant balloons with good result.  · We then tried advancing a 0.75 Synergy stent to the diagonal but would not go past the proximal or ostial segment. Therefore we withdrew the stent from the diagonal and advance a stent to the LAD. The intention at this point was because the ostium of the diagonal looked acceptable we would stented the LAD and recross into the diagonal.  · Once he stented the LAD with a 3.5 x 30 8I noticed that her ST segments were elevated. At this point I expected the diagonal would be having slow flow.  · Angiographically there was no flow past the mid segment of the diagonal. I rapidly rewired the vessel.  · At this point when angiographic assessment was done there was a perforation in the LAD. I knew that she was going to have pericardial fluid and tamponade  · At this point we rapidly did a pericardial access with fluoroscopic guidance. I was able to negotiate a catheter into the pericardial space and withdrew about 700 mL of  blood.  · We autotransfuser split into the patient.  · Anesthesia was present and I advised them to intubate her and manage her hemodynamics which they did. Pressors were started including epinephrine and levo fed as well as Luís-Synephrine.  · I then placed iIMPELLA catheter via right femoral artery access. This was an IMPELLA CP  · We then continued aspirating blood off the pericardial space. At this point we did multiple balloon inflations for 6-10 minutes in the proximal LAD to try to help to close off the perforation. This however was not successful. Therefore placed a 2.5 covered stent in the distal LAD. She had persistent bleeding but ultimately the bleeding did stop.  · I needed to guide liner to deliver the covered stent.  · With the covered stent in place she had GABRIELE 2 flow into the distal LAD at the end of the procedure with what appeared to be thrombus in the LAD. However at this point we had held off on giving any further heparin and I did start her initially on IIb IIIa inhibitor once the slow flow in the diagonal was noted but it was stopped immediately once the perforation was noted.  · Surgery had been called and were available and in the room at the time the decision was made to take her to the operating room.\  · She was hemodynamically stable at this time with a good rhythm. Blood pressure was stable. She was on pressors but systolic pressure was well above 100.  · We had to do chest compressions for about 1 minutes. She also had to be defibrillated once. However her airway was accessed immediately and the pericardial effusion was drained immediately.  · She went to the OR in critical condition however.  · I explained to the patient's family the occurrences. Explained this is unfortunately a given risk of this complex type of intervention but that everything was done in an expedient fashion to try to restore circulation and try to manage the perforation.  · We are hopeful that she will make a  meaningful recovery.  · It is possible that her LV function will be affected by these events.    I certify that I was present for catheter insertion, catheter manipulation, angiography, and angiographic interpretation of this patient.      Procedure Log documented by Documenter: Liz Ward RN and verified by Kumar Colon.    Date: 7/11/2018  Time: 6:27 PM      Other Most Recent Cardiology Results  Results for orders placed in visit on 08/07/23    Cardiac device check - Remote    Narrative  Battery and Leads (BL)  Normal parameters noted on battery and lead(s)  Auto-threshold measurement unavailable or programmed off on lead(s)    Presenting Rhythm (ND)  Atrial Pacing-Ventricular Sensing (AP-VS)    Arrhythmic events (AE)  Device-identified arrhythmic events without corresponding EGMs and/or details noted    Cardiovascular Physiologic Parameters (CPP)  No abnormalities in physiologic parameters identified    Transmission Information (TI)  Device Summary Report    Follow Up (FU)  Continue remote monitoring with quarterly reporting    Additional Comments  ICD Report  Monitoring period: 4/21/23-6/14/23    Battery/Lead Status: 54%    Ap: 92%  : 0%    Device Defined Counters:  Atrial Fibrillation/Flutter:  up to 2  Trending illustrates atrial events with out EGMs.  AF Saffell: up to 0%      REVIEW OF SYSTEMS: As noted in HPI   CARDIOVASCULAR: No recent chest pain, palpitations, arm/neck/jaw pain, or edema.  RESPIRATORY: No recent fever, cough, SOB.  : No blood in the urine  GI: No reflux, nausea, vomiting, or blood in stool.   MUSCULOSKELETAL: No falls.   NEURO: No headaches, syncope, or dizziness.  EYES: No sudden changes in vision.     Past Medical History:   Diagnosis Date    Anticoagulant long-term use     Arthritis     Cancer     CHF (congestive heart failure)     COPD (chronic obstructive pulmonary disease)     Coronary artery disease     Coronary artery disease involving native coronary artery of native heart  07/21/2018    Defibrillator discharge     Depression     DVT (deep venous thrombosis) 2012    left calf    Encounter for blood transfusion     GERD (gastroesophageal reflux disease)     Hematoma     several    History of recurrent UTIs     Hypertension     ICD (implantable cardioverter-defibrillator) in place     Non-recurrent unilateral inguinal hernia without obstruction or gangrene 06/2021    Pacemaker     Paroxysmal atrial fibrillation     Skin cancer     SVT (supraventricular tachycardia)      Past Surgical History:   Procedure Laterality Date    ABLATION N/A 10/26/2018    Procedure: Ablation;  Surgeon: Bonilla Fitzgerald MD;  Location[facility] CATH LAB;  Service: Cardiology;  Laterality: N/A;  AFL, LOUANN, RFA, PAVAN, MAC, SK ,3prep *Anticipate left femoral approach*    APPENDECTOMY      ATHERECTOMY N/A 7/11/2018    Procedure: Atherectomy;  Surgeon: Kumar Colon MD;  Location[facility] CATH;  Service: Cardiology;  Laterality: N/A;    BREAST BIOPSY Left     over 10 yrs. ago    CARDIAC CATHETERIZATION      CARDIAC SURGERY      angiogram    CATARACT EXTRACTION W/  INTRAOCULAR LENS IMPLANT      CHOLECYSTECTOMY      CORONARY ANGIOGRAPHY N/A 6/19/2018    Procedure: ANGIOGRAM-CORONARY;  Surgeon: Kumar Colon MD;  Location: [facility] CATH;  Service: Cardiology;  Laterality: N/A;    CORONARY ARTERY BYPASS GRAFT      CORONARY ARTERY BYPASS GRAFT (CABG) N/A 7/11/2018    Procedure: CREATION, BYPASS, ARTERIAL, AORTA TO CORONARY, USING GRAFT  [facility];  Surgeon: Aurora Hunter MD;  [facility] OR;  Service: Cardiovascular;  Laterality: N/A;  emergency    CORONARY STENT PLACEMENT N/A 7/11/2018    Procedure: Stent DIXIE coronary;  Surgeon: Kumar Colon MD;  Location: [facility] CATH;  Service: Cardiology;  Laterality: N/A;    EYE SURGERY      cataract    HYSTERECTOMY      INSERTION OF INTRAVASCULAR MICROAXIAL BLOOD PUMP N/A 7/11/2018    Procedure: INSERTION, IMPELLA;  Surgeon: Kumar Colon MD;  Location: [facility] CATH;  Service: Cardiology;   Laterality: N/A;    INSERTION OF TEMPORARY PACEMAKER N/A 2018    Procedure: INSERTION, PACEMAKER, TEMPORARY;  Surgeon: Kumar Colon MD;  Location: [facility] CATH;  Service: Cardiology;  Laterality: N/A;    KNEE ARTHROSCOPY W/ DEBRIDEMENT      LEFT HEART CATHETERIZATION Left 2018    Procedure: Left heart cath;  Surgeon: Kumar Colon MD;  Location: [facility] CATH;  Service: Cardiology;  Laterality: Left;    LEFT HEART CATHETERIZATION Left 2018    Procedure: Left heart cath;  Surgeon: Kumar Colon MD;  Location: [facility] CATH;  Service: Cardiology;  Laterality: Left;    OOPHORECTOMY      PERICARDIOCENTESIS N/A 2018    Procedure: Pericardiocentesis;  Surgeon: Kumar Colon MD;  Location: [facility] CATH;  Service: Cardiology;  Laterality: N/A;    ROBOT-ASSISTED LAPAROSCOPIC REPAIR OF INGUINAL HERNIA USING DA YESSICA XI Right 2021    Procedure: XI ROBOTIC REPAIR, HERNIA, INGUINAL;  Surgeon: Keshav Alonzo MD;  Location: [facility] OR;  Service: General;  Laterality: Right;    TRANSESOPHAGEAL ECHOCARDIOGRAPHY N/A 10/26/2018    Procedure: ECHOCARDIOGRAM, TRANSESOPHAGEAL;  Surgeon: Bonilla Fitzgerald MD;  Location: [facility] CATH LAB;  Service: Cardiology;  Laterality: N/A;    TREATMENT OF CARDIAC ARRHYTHMIA N/A 10/8/2018    Procedure: Cardioversion/Defibrillation;  Surgeon: Rodo Singleton MD;  Location: [facility];  Service: Cardiology;  Laterality: N/A;     Social History     Tobacco Use    Smoking status: Former     Packs/day: 1.00     Years: 40.00     Pack years: 40.00     Types: Cigarettes     Quit date:      Years since quittin.5    Smokeless tobacco: Never    Tobacco comments:     quit smoking in .   Substance Use Topics    Alcohol use: Yes     Comment: seldom    Drug use: No         Objective      Vitals:    23 1329   BP: 118/68   Pulse: 81       LAST EKG  Results for orders placed or performed in visit on 07/15/22   IN OFFICE EKG 12-LEAD (to [place])    Collection Time: 07/15/22   3:12 PM    Narrative    Test Reason : z00.00    Vent. Rate : 074 BPM     Atrial Rate : 074 BPM     P-R Int : 148 ms          QRS Dur : 080 ms      QT Int : 392 ms       P-R-T Axes : 000 -31 098 degrees     QTc Int : 435 ms    Atrial-paced rhythm  Left axis deviation  Nonspecific ST and/or T wave abnormalities  Abnormal ECG  When compared with ECG of 25-JUN-2022 10:48,  No significant change was found  Confirmed by Agustin Gagnon MD ([id number]) on 7/16/2022 2:06:44 PM    Referred By: CONSTANTINO HUGO           Confirmed By:Agustin Gagnon MD     LIPIDS - LAST 2   Lab Results   Component Value Date    CHOL 146 07/12/2023    CHOL 138 06/01/2022    HDL 50 07/12/2023    HDL 53 06/01/2022    LDLCALC 75.8 07/12/2023    LDLCALC 71.6 06/01/2022    TRIG 101 07/12/2023    TRIG 67 06/01/2022    CHOLHDL 34.2 07/12/2023    CHOLHDL 38.4 06/01/2022     CARDIAC PROFILE - LAST 2  Lab Results   Component Value Date     (H) 07/12/2023     (H) 06/01/2022    TROPONINI 0.296 (HH) 07/27/2018    TROPONINI <0.012 11/13/2017      CBC - LAST 2  Lab Results   Component Value Date    WBC 4.72 07/12/2023    WBC 4.93 06/25/2022    RBC 4.15 07/12/2023    RBC 4.36 06/25/2022    HGB 13.3 07/12/2023    HGB 13.9 06/25/2022    HCT 41.0 07/12/2023    HCT 43.1 06/25/2022     07/12/2023     06/25/2022     Lab Results   Component Value Date    LABPT 15.4 (H) 01/10/2019    LABPT 16.0 (H) 07/27/2018    INR 1.3 01/10/2019    INR 1.3 (H) 10/26/2018    APTT 31.0 01/10/2019    APTT 26.5 10/26/2018     CHEMISTRY - LAST 2  Lab Results   Component Value Date     07/12/2023     06/25/2022    K 4.6 07/12/2023    K 4.8 06/25/2022     07/12/2023     06/25/2022    CO2 26 07/12/2023    CO2 30 06/25/2022    ANIONGAP 7 (L) 07/12/2023    ANIONGAP 1 (L) 06/25/2022    BUN 28 (H) 07/12/2023    BUN 25 (H) 06/25/2022    CREATININE 1.1 07/12/2023    CREATININE 1.01 06/25/2022    GLU 92 07/12/2023    GLU 97 06/25/2022    CALCIUM 9.0  07/12/2023    CALCIUM 9.2 06/25/2022    PH 7.49 (H) 07/13/2018    PH 7.48 (H) 07/12/2018    MG 2.3 06/25/2022    MG 2.3 06/24/2022    ALBUMIN 3.6 07/12/2023    ALBUMIN 3.7 06/01/2022    PROT 6.8 07/12/2023    PROT 6.9 06/01/2022    ALKPHOS 75 07/12/2023    ALKPHOS 79 06/01/2022    ALT 12 07/12/2023    ALT 12 06/01/2022    AST 19 07/12/2023    AST 19 06/01/2022    BILITOT 0.7 07/12/2023    BILITOT 0.7 06/01/2022      ENDOCRINE - LAST 2  Lab Results   Component Value Date    TSH 2.233 06/01/2022    TSH 1.195 06/05/2018        PHYSICAL EXAM  CONSTITUTIONAL: Well built, well nourished in no apparent distress  NECK: no carotid bruit, no JVD  LUNGS: CTA  CHEST WALL: no tenderness  HEART: regular rate and rhythm, S1, S2 normal, no murmur, click, rub or gallop   ABDOMEN: soft, non-tender; bowel sounds normal; no masses,  no organomegaly  EXTREMITIES: Extremities normal, no edema, no calf tenderness noted  NEURO: AAO X 3    I HAVE REVIEWED :    The vital signs, most recent cardiac testing, and most recent pertinent non-cardiology provider notes.    Current Outpatient Medications   Medication Instructions    azelastine (ASTELIN) 137 mcg (0.1 %) nasal spray Instill 1 spray (137 mcg total) by Nasal route 2 (two) times daily.    BIOTIN ORAL 1 capsule, Oral, Daily    carboxymethylcellulose (REFRESH PLUS) 0.5 % Dpet 1 drop, 3 times daily PRN    clopidogreL (PLAVIX) 75 mg, Oral, Daily    cranberry 400 mg Cap 1 capsule, Oral, Daily    fluocinonide (LIDEX) 0.05 % external solution Topical (Top), 2 times daily    furosemide (LASIX) 20 mg, Oral, Daily    hydrocortisone 2.5 % cream APPLY EXTERNALLY TO THE AFFECTED AREA TWICE DAILY FOR 1 WEEK THEN AS NEEDED FOR FLARE UPS    ketoconazole (NIZORAL) 2 % cream APPLY EXTERNALLY TO RASH ONCE TO TWICE DAILY AS DIRECTED    ketoconazole (NIZORAL) 2 % shampoo Topical (Top), Twice weekly    metoprolol succinate (TOPROL-XL) 25 mg, Oral, Daily    rosuvastatin (CRESTOR) 5 mg, Oral, Nightly     sacubitriL-valsartan (ENTRESTO) 24-26 mg per tablet 1 tablet, Oral, 2 times daily    sotaloL (BETAPACE) 80 mg, Oral, 2 times daily    spironolactone (ALDACTONE) 12.5 mg, Oral, Daily    varicella-zoster [id number], PF, (SHINGRIX) 50 mcg/0.5 mL injection Intramuscular    vit C,X-Jp-iweug-lutein-zeaxan (PRESERVISION AREDS 2) 250-90-40-1 mg Cap Oral, 2 times daily    VITAMIN D2 1,250 mcg (50,000 unit) capsule Take one capsule by mouth two times a week on Monday and Friday    XARELTO 20 mg, Oral, With dinner      Assessment & Plan     Acute on chronic combined systolic (congestive) and diastolic (congestive) heart failure  BNP number recently elevated in 300s but pt on Entresto and proBNP should be checked in future instead Euvolemic on exam, compensated   Continue entresto, aldactone, toprol at present dosing       Hypertension  BP well controlled   Continue CHF GDMT as above  Low Na diet     Coronary artery disease involving native coronary artery of native heart  No angina   Continue statin   Continue Plavix 4x weekly per Dr. Singleton     Hyperlipidemia  Continue crestor  Mg daily     Paroxysmal atrial fibrillation  0% burden on recent device check   Continue xarelto 20 mg nightly   No bleeding tendencies noted     ICD (implantable cardioverter-defibrillator) in place  NO shocks   Normal device function       F/u in 6 months     Naomi Peters, PA-C [facility]   Office: [phone]

## 2023-07-18 NOTE — ASSESSMENT & PLAN NOTE
BNP number recently elevated in 300s but pt on Entresto and proBNP should be checked in future instead Euvolemic on exam, compensated

## 2023-08-07 ENCOUNTER — CLINICAL SUPPORT (OUTPATIENT)
Dept: CARDIOLOGY | Facility: HOSPITAL | Age: 83
End: 2023-08-07
Payer: MEDICARE

## 2023-08-07 DIAGNOSIS — Z95.810 PRESENCE OF AUTOMATIC (IMPLANTABLE) CARDIAC DEFIBRILLATOR: ICD-10-CM

## 2023-08-07 PROCEDURE — 93296 REM INTERROG EVL PM/IDS: CPT | Mod: PO | Performed by: INTERNAL MEDICINE

## 2023-08-09 ENCOUNTER — PATIENT MESSAGE (OUTPATIENT)
Dept: ADMINISTRATIVE | Facility: HOSPITAL | Age: 83
End: 2023-08-09
Payer: MEDICARE

## 2023-08-14 ENCOUNTER — OFFICE VISIT (OUTPATIENT)
Dept: DERMATOLOGY | Facility: CLINIC | Age: 83
End: 2023-08-14
Payer: MEDICARE

## 2023-08-14 VITALS — BODY MASS INDEX: 30.34 KG/M2 | RESPIRATION RATE: 18 BRPM | WEIGHT: 182.13 LBS | HEIGHT: 65 IN

## 2023-08-14 DIAGNOSIS — L40.8 SEBOPSORIASIS: Primary | ICD-10-CM

## 2023-08-14 DIAGNOSIS — L82.0 INFLAMED SEBORRHEIC KERATOSIS: ICD-10-CM

## 2023-08-14 PROCEDURE — 17110 DESTRUCTION B9 LES UP TO 14: CPT | Mod: S$GLB,,, | Performed by: DERMATOLOGY

## 2023-08-14 PROCEDURE — 1101F PR PT FALLS ASSESS DOC 0-1 FALLS W/OUT INJ PAST YR: ICD-10-PCS | Mod: CPTII,S$GLB,, | Performed by: DERMATOLOGY

## 2023-08-14 PROCEDURE — 99999 PR PBB SHADOW E&M-EST. PATIENT-LVL IV: CPT | Mod: PBBFAC,,, | Performed by: DERMATOLOGY

## 2023-08-14 PROCEDURE — 99214 OFFICE O/P EST MOD 30 MIN: CPT | Mod: 25,S$GLB,, | Performed by: DERMATOLOGY

## 2023-08-14 PROCEDURE — 1159F MED LIST DOCD IN RCRD: CPT | Mod: CPTII,S$GLB,, | Performed by: DERMATOLOGY

## 2023-08-14 PROCEDURE — 1126F AMNT PAIN NOTED NONE PRSNT: CPT | Mod: CPTII,S$GLB,, | Performed by: DERMATOLOGY

## 2023-08-14 PROCEDURE — 3288F FALL RISK ASSESSMENT DOCD: CPT | Mod: CPTII,S$GLB,, | Performed by: DERMATOLOGY

## 2023-08-14 PROCEDURE — 3288F PR FALLS RISK ASSESSMENT DOCUMENTED: ICD-10-PCS | Mod: CPTII,S$GLB,, | Performed by: DERMATOLOGY

## 2023-08-14 PROCEDURE — 1159F PR MEDICATION LIST DOCUMENTED IN MEDICAL RECORD: ICD-10-PCS | Mod: CPTII,S$GLB,, | Performed by: DERMATOLOGY

## 2023-08-14 PROCEDURE — 1126F PR PAIN SEVERITY QUANTIFIED, NO PAIN PRESENT: ICD-10-PCS | Mod: CPTII,S$GLB,, | Performed by: DERMATOLOGY

## 2023-08-14 PROCEDURE — 1101F PT FALLS ASSESS-DOCD LE1/YR: CPT | Mod: CPTII,S$GLB,, | Performed by: DERMATOLOGY

## 2023-08-14 PROCEDURE — 17110 PR DESTRUCTION BENIGN LESIONS UP TO 14: ICD-10-PCS | Mod: S$GLB,,, | Performed by: DERMATOLOGY

## 2023-08-14 PROCEDURE — 99999 PR PBB SHADOW E&M-EST. PATIENT-LVL IV: ICD-10-PCS | Mod: PBBFAC,,, | Performed by: DERMATOLOGY

## 2023-08-14 PROCEDURE — 99214 PR OFFICE/OUTPT VISIT, EST, LEVL IV, 30-39 MIN: ICD-10-PCS | Mod: 25,S$GLB,, | Performed by: DERMATOLOGY

## 2023-08-14 NOTE — PROGRESS NOTES
Subjective:      Patient ID:  Sair Biswas is a 83 y.o. female who presents for   Chief Complaint   Patient presents with    Lesion     HPI  Patient present for lesion. Recent tbse 2/2023 - only benign lesions noted, addressed seb derm at scalp. C/o spot on chest, left thigh, and scalp f/u.    Yes Phx of NMSC.  Type unknown, on leg, unsure of leg and type, many years ago.  No Fhx of melanoma.    Past Medical History:   Diagnosis Date    Anticoagulant long-term use     Arthritis     Cancer     CHF (congestive heart failure)     COPD (chronic obstructive pulmonary disease)     Coronary artery disease     Coronary artery disease involving native coronary artery of native heart 07/21/2018    Defibrillator discharge     Depression     DVT (deep venous thrombosis) 2012    left calf    Encounter for blood transfusion     GERD (gastroesophageal reflux disease)     Hematoma     several    History of recurrent UTIs     Hypertension     ICD (implantable cardioverter-defibrillator) in place     Non-recurrent unilateral inguinal hernia without obstruction or gangrene 06/2021    Pacemaker     Paroxysmal atrial fibrillation     Skin cancer     SVT (supraventricular tachycardia)        Review of Systems   Constitutional:  Negative for fever, chills and fatigue.   Respiratory:  Negative for cough and shortness of breath.    Skin:  Positive for activity-related sunscreen use.   Hematologic/Lymphatic: Bruises/bleeds easily (plavix, xarelto).       Objective:   Physical Exam   Constitutional: She appears well-developed and well-nourished. No distress.   Neurological: She is alert and oriented to person, place, and time. She is not disoriented.   Psychiatric: She has a normal mood and affect.   Skin:   Areas Examined (abnormalities noted in diagram):   Scalp / Hair Palpated and Inspected                 Diagram Legend     Erythematous scaling macule/papule c/w actinic keratosis       Vascular papule c/w angioma      Pigmented  verrucoid papule/plaque c/w seborrheic keratosis      Yellow umbilicated papule c/w sebaceous hyperplasia      Irregularly shaped tan macule c/w lentigo     1-2 mm smooth white papules consistent with Milia      Movable subcutaneous cyst with punctum c/w epidermal inclusion cyst      Subcutaneous movable cyst c/w pilar cyst      Firm pink to brown papule c/w dermatofibroma      Pedunculated fleshy papule(s) c/w skin tag(s)      Evenly pigmented macule c/w junctional nevus     Mildly variegated pigmented, slightly irregular-bordered macule c/w mildly atypical nevus      Flesh colored to evenly pigmented papule c/w intradermal nevus       Pink pearly papule/plaque c/w basal cell carcinoma      Erythematous hyperkeratotic cursted plaque c/w SCC      Surgical scar with no sign of skin cancer recurrence      Open and closed comedones      Inflammatory papules and pustules      Verrucoid papule consistent consistent with wart     Erythematous eczematous patches and plaques     Dystrophic onycholytic nail with subungual debris c/w onychomycosis     Umbilicated papule    Erythematous-base heme-crusted tan verrucoid plaque consistent with inflamed seborrheic keratosis     Erythematous Silvery Scaling Plaque c/w Psoriasis     See annotation      Assessment / Plan:        Inflamed seborrheic keratosis  - Discussed diagnosis, etiology, and treatment options.   - Cryosurgery Procedure Note: Discussed procedure with patient/patient's guardian including risks and benefits as well as treatment alternatives. Risks of procedure include pain, itching, swelling, redness, blistering, crusting, wound formation, post-inflammatory pigmentary alteration, scar, recurrence. Verbal consent obtained. LN2 cryosurgery performed to 1 lesion(s). Patient tolerated procedure well. After-visit wound care instructions reviewed and provided in writing.     Sebopsoriasis  Not at goal, not fully compliant with Dr Crowell's appropriate plan, reviewed  again  For scalp:  - ketoconazole shampoo - apply to scalp twice weekly, lather and let sit for 5 minutes before rinsing   - apply fluocinonide solution twice daily x 2 weeks as needed for itching on scalp     For ears:  - mix ketoconazole cream and hydrocortisone cream and apply in ears twice daily for 7-10 days as needed for itching and flaking         Follow up for annual skin checks, sooner PRN.

## 2023-08-26 NOTE — PATIENT INSTRUCTIONS
For scalp:  - ketoconazole shampoo - apply to scalp twice weekly, lather and let sit for 5 minutes before rinsing   - apply fluocinonide solution twice daily x 2 weeks as needed for itching on scalp     For ears:  - mix ketoconazole cream and hydrocortisone cream and apply in ears twice daily for 7-10 days as needed for itching and flaking

## 2023-10-05 ENCOUNTER — PATIENT MESSAGE (OUTPATIENT)
Dept: DERMATOLOGY | Facility: CLINIC | Age: 83
End: 2023-10-05
Payer: MEDICARE

## 2023-10-11 ENCOUNTER — OFFICE VISIT (OUTPATIENT)
Dept: DERMATOLOGY | Facility: CLINIC | Age: 83
End: 2023-10-11
Payer: MEDICARE

## 2023-10-11 DIAGNOSIS — L40.8 SEBOPSORIASIS: Primary | ICD-10-CM

## 2023-10-11 DIAGNOSIS — L82.1 SK (SEBORRHEIC KERATOSIS): ICD-10-CM

## 2023-10-11 PROCEDURE — 1159F PR MEDICATION LIST DOCUMENTED IN MEDICAL RECORD: ICD-10-PCS | Mod: CPTII,S$GLB,, | Performed by: DERMATOLOGY

## 2023-10-11 PROCEDURE — 1159F MED LIST DOCD IN RCRD: CPT | Mod: CPTII,S$GLB,, | Performed by: DERMATOLOGY

## 2023-10-11 PROCEDURE — 99214 OFFICE O/P EST MOD 30 MIN: CPT | Mod: S$GLB,,, | Performed by: DERMATOLOGY

## 2023-10-11 PROCEDURE — 1101F PR PT FALLS ASSESS DOC 0-1 FALLS W/OUT INJ PAST YR: ICD-10-PCS | Mod: CPTII,S$GLB,, | Performed by: DERMATOLOGY

## 2023-10-11 PROCEDURE — 3288F PR FALLS RISK ASSESSMENT DOCUMENTED: ICD-10-PCS | Mod: CPTII,S$GLB,, | Performed by: DERMATOLOGY

## 2023-10-11 PROCEDURE — 99999 PR PBB SHADOW E&M-EST. PATIENT-LVL III: CPT | Mod: PBBFAC,,, | Performed by: DERMATOLOGY

## 2023-10-11 PROCEDURE — 3288F FALL RISK ASSESSMENT DOCD: CPT | Mod: CPTII,S$GLB,, | Performed by: DERMATOLOGY

## 2023-10-11 PROCEDURE — 99999 PR PBB SHADOW E&M-EST. PATIENT-LVL III: ICD-10-PCS | Mod: PBBFAC,,, | Performed by: DERMATOLOGY

## 2023-10-11 PROCEDURE — 1101F PT FALLS ASSESS-DOCD LE1/YR: CPT | Mod: CPTII,S$GLB,, | Performed by: DERMATOLOGY

## 2023-10-11 PROCEDURE — 99214 PR OFFICE/OUTPT VISIT, EST, LEVL IV, 30-39 MIN: ICD-10-PCS | Mod: S$GLB,,, | Performed by: DERMATOLOGY

## 2023-10-11 RX ORDER — CLOBETASOL PROPIONATE 0.05 G/100ML
SHAMPOO TOPICAL
Qty: 118 ML | Refills: 3 | Status: SHIPPED | OUTPATIENT
Start: 2023-10-11

## 2023-10-11 NOTE — PROGRESS NOTES
Subjective:      Patient ID:  Sari Biswas is a 83 y.o. female who presents for   Chief Complaint   Patient presents with    Itching     Scalp     HPI    2 month f/u sebopsoriasis. Pt using ketoconazole shampoo 2x weekly; admits to inconsistent use of Lidex solution, inconvenience of Lidex solution. Also would like lesion at back checked.   No further complaints today.     Due annual TBSE 2/2024    +NMSC  Type unknown, on leg, unsure of leg and type, many years ago.    Past Medical History:   Diagnosis Date    Anticoagulant long-term use     Arthritis     Cancer     CHF (congestive heart failure)     COPD (chronic obstructive pulmonary disease)     Coronary artery disease     Coronary artery disease involving native coronary artery of native heart 07/21/2018    Defibrillator discharge     Depression     DVT (deep venous thrombosis) 2012    left calf    Encounter for blood transfusion     GERD (gastroesophageal reflux disease)     Hematoma     several    History of recurrent UTIs     Hypertension     ICD (implantable cardioverter-defibrillator) in place     Non-recurrent unilateral inguinal hernia without obstruction or gangrene 06/2021    Pacemaker     Paroxysmal atrial fibrillation     Skin cancer     SVT (supraventricular tachycardia)        Review of Systems   Constitutional:  Negative for fever, chills and fatigue.   Respiratory:  Negative for cough and shortness of breath.    Skin:  Positive for activity-related sunscreen use.   Hematologic/Lymphatic: Bruises/bleeds easily (plavix, xarelto).       Objective:   Physical Exam   Constitutional: She appears well-developed and well-nourished. No distress.   Neurological: She is alert and oriented to person, place, and time. She is not disoriented.   Psychiatric: She has a normal mood and affect.   Skin:   Areas Examined (abnormalities noted in diagram):   Scalp / Hair Palpated and Inspected                 Diagram Legend     Erythematous scaling macule/papule  c/w actinic keratosis       Vascular papule c/w angioma      Pigmented verrucoid papule/plaque c/w seborrheic keratosis      Yellow umbilicated papule c/w sebaceous hyperplasia      Irregularly shaped tan macule c/w lentigo     1-2 mm smooth white papules consistent with Milia      Movable subcutaneous cyst with punctum c/w epidermal inclusion cyst      Subcutaneous movable cyst c/w pilar cyst      Firm pink to brown papule c/w dermatofibroma      Pedunculated fleshy papule(s) c/w skin tag(s)      Evenly pigmented macule c/w junctional nevus     Mildly variegated pigmented, slightly irregular-bordered macule c/w mildly atypical nevus      Flesh colored to evenly pigmented papule c/w intradermal nevus       Pink pearly papule/plaque c/w basal cell carcinoma      Erythematous hyperkeratotic cursted plaque c/w SCC      Surgical scar with no sign of skin cancer recurrence      Open and closed comedones      Inflammatory papules and pustules      Verrucoid papule consistent consistent with wart     Erythematous eczematous patches and plaques     Dystrophic onycholytic nail with subungual debris c/w onychomycosis     Umbilicated papule    Erythematous-base heme-crusted tan verrucoid plaque consistent with inflamed seborrheic keratosis     Erythematous Silvery Scaling Plaque c/w Psoriasis     See annotation      Assessment / Plan:          Sebopsoriasis  -     clobetasoL (CLOBEX) 0.05 % shampoo; Wash scalp 2 to 3 times a week as needed for rash and itching as scalp.  Dispense: 118 mL; Refill: 3  For scalp:  - Wash scalp at least every other day, alternating use of ketoconazole shampoo and new rx clobetasol shampoo. Allow shampoo to sit for at least 5 minutes prior to rinsing.   - Apply Lidex solution BID PRN rash, itching at scalp.     For ears, brows:  - Mix ketoconazole and HCT cream BID PRN rash, itching at ears, brows  - Consider use of ketoconazole cream once daily to rash / itch prone areas for prevention.   -  Counseled on potential SE of medication(s) and instructed on use.     SK  - Benign; reassured treatment not necessary.             Follow up for annual skin checks, sooner PRN.

## 2023-11-05 ENCOUNTER — CLINICAL SUPPORT (OUTPATIENT)
Dept: CARDIOLOGY | Facility: HOSPITAL | Age: 83
End: 2023-11-05
Payer: MEDICARE

## 2023-11-05 DIAGNOSIS — Z95.810 PRESENCE OF AUTOMATIC (IMPLANTABLE) CARDIAC DEFIBRILLATOR: ICD-10-CM

## 2023-11-05 PROCEDURE — 93295 DEV INTERROG REMOTE 1/2/MLT: CPT | Mod: ,,, | Performed by: INTERNAL MEDICINE

## 2023-11-05 PROCEDURE — 93295 CARDIAC DEVICE CHECK - REMOTE: ICD-10-PCS | Mod: ,,, | Performed by: INTERNAL MEDICINE

## 2023-11-05 PROCEDURE — 93296 REM INTERROG EVL PM/IDS: CPT | Mod: PO | Performed by: INTERNAL MEDICINE

## 2023-11-08 ENCOUNTER — OFFICE VISIT (OUTPATIENT)
Dept: FAMILY MEDICINE | Facility: CLINIC | Age: 83
End: 2023-11-08
Payer: MEDICARE

## 2023-11-08 VITALS
OXYGEN SATURATION: 96 % | DIASTOLIC BLOOD PRESSURE: 70 MMHG | BODY MASS INDEX: 28.61 KG/M2 | WEIGHT: 171.75 LBS | SYSTOLIC BLOOD PRESSURE: 120 MMHG | HEART RATE: 78 BPM | HEIGHT: 65 IN

## 2023-11-08 DIAGNOSIS — I10 PRIMARY HYPERTENSION: ICD-10-CM

## 2023-11-08 DIAGNOSIS — Z00.00 WELLNESS EXAMINATION: Primary | ICD-10-CM

## 2023-11-08 DIAGNOSIS — E78.2 MIXED HYPERLIPIDEMIA: ICD-10-CM

## 2023-11-08 DIAGNOSIS — N18.31 CHRONIC KIDNEY DISEASE, STAGE 3A: ICD-10-CM

## 2023-11-08 DIAGNOSIS — I48.0 PAROXYSMAL ATRIAL FIBRILLATION: ICD-10-CM

## 2023-11-08 DIAGNOSIS — I50.42 CHRONIC COMBINED SYSTOLIC AND DIASTOLIC HEART FAILURE: ICD-10-CM

## 2023-11-08 PROBLEM — I50.43 ACUTE ON CHRONIC COMBINED SYSTOLIC (CONGESTIVE) AND DIASTOLIC (CONGESTIVE) HEART FAILURE: Status: RESOLVED | Noted: 2018-07-25 | Resolved: 2023-11-08

## 2023-11-08 PROBLEM — I48.3 TYPICAL ATRIAL FLUTTER: Status: RESOLVED | Noted: 2018-10-22 | Resolved: 2023-11-08

## 2023-11-08 PROBLEM — I48.91 ATRIAL FIBRILLATION: Status: RESOLVED | Noted: 2018-07-25 | Resolved: 2023-11-08

## 2023-11-08 PROCEDURE — 3078F PR MOST RECENT DIASTOLIC BLOOD PRESSURE < 80 MM HG: ICD-10-PCS | Mod: CPTII,S$GLB,, | Performed by: FAMILY MEDICINE

## 2023-11-08 PROCEDURE — 1101F PT FALLS ASSESS-DOCD LE1/YR: CPT | Mod: CPTII,S$GLB,, | Performed by: FAMILY MEDICINE

## 2023-11-08 PROCEDURE — 1159F MED LIST DOCD IN RCRD: CPT | Mod: CPTII,S$GLB,, | Performed by: FAMILY MEDICINE

## 2023-11-08 PROCEDURE — 99397 PER PM REEVAL EST PAT 65+ YR: CPT | Mod: S$GLB,,, | Performed by: FAMILY MEDICINE

## 2023-11-08 PROCEDURE — 3288F FALL RISK ASSESSMENT DOCD: CPT | Mod: CPTII,S$GLB,, | Performed by: FAMILY MEDICINE

## 2023-11-08 PROCEDURE — 3288F PR FALLS RISK ASSESSMENT DOCUMENTED: ICD-10-PCS | Mod: CPTII,S$GLB,, | Performed by: FAMILY MEDICINE

## 2023-11-08 PROCEDURE — 3074F PR MOST RECENT SYSTOLIC BLOOD PRESSURE < 130 MM HG: ICD-10-PCS | Mod: CPTII,S$GLB,, | Performed by: FAMILY MEDICINE

## 2023-11-08 PROCEDURE — 3078F DIAST BP <80 MM HG: CPT | Mod: CPTII,S$GLB,, | Performed by: FAMILY MEDICINE

## 2023-11-08 PROCEDURE — 99999 PR PBB SHADOW E&M-EST. PATIENT-LVL IV: ICD-10-PCS | Mod: PBBFAC,,, | Performed by: FAMILY MEDICINE

## 2023-11-08 PROCEDURE — 1126F PR PAIN SEVERITY QUANTIFIED, NO PAIN PRESENT: ICD-10-PCS | Mod: CPTII,S$GLB,, | Performed by: FAMILY MEDICINE

## 2023-11-08 PROCEDURE — 3074F SYST BP LT 130 MM HG: CPT | Mod: CPTII,S$GLB,, | Performed by: FAMILY MEDICINE

## 2023-11-08 PROCEDURE — 99397 PR PREVENTIVE VISIT,EST,65 & OVER: ICD-10-PCS | Mod: S$GLB,,, | Performed by: FAMILY MEDICINE

## 2023-11-08 PROCEDURE — 1101F PR PT FALLS ASSESS DOC 0-1 FALLS W/OUT INJ PAST YR: ICD-10-PCS | Mod: CPTII,S$GLB,, | Performed by: FAMILY MEDICINE

## 2023-11-08 PROCEDURE — 1159F PR MEDICATION LIST DOCUMENTED IN MEDICAL RECORD: ICD-10-PCS | Mod: CPTII,S$GLB,, | Performed by: FAMILY MEDICINE

## 2023-11-08 PROCEDURE — 99999 PR PBB SHADOW E&M-EST. PATIENT-LVL IV: CPT | Mod: PBBFAC,,, | Performed by: FAMILY MEDICINE

## 2023-11-08 PROCEDURE — 1126F AMNT PAIN NOTED NONE PRSNT: CPT | Mod: CPTII,S$GLB,, | Performed by: FAMILY MEDICINE

## 2023-11-08 NOTE — PROGRESS NOTES
Subjective:       Patient ID: Sari Biswas is a 83 y.o. female.    Chief Complaint: Establish Care    Here as new patient to me. Saw Dr. Anne previously. Needs to establish care.  Due for wellness.  In her normal state of health.  Sees cardiology as well.        Review of Systems   Constitutional:  Negative for chills and fever.   Respiratory:  Negative for cough, chest tightness and shortness of breath.    Cardiovascular:  Negative for chest pain, palpitations and leg swelling.   Endocrine: Negative for cold intolerance and heat intolerance.   Psychiatric/Behavioral:  Negative for decreased concentration. The patient is not nervous/anxious.        Objective:      Physical Exam  Vitals and nursing note reviewed.   Constitutional:       Appearance: She is well-developed.   HENT:      Head: Normocephalic and atraumatic.   Cardiovascular:      Rate and Rhythm: Normal rate and regular rhythm.      Heart sounds: Normal heart sounds.   Pulmonary:      Effort: Pulmonary effort is normal.      Breath sounds: Normal breath sounds.   Psychiatric:         Mood and Affect: Mood normal.         Behavior: Behavior normal.         Assessment:       1. Wellness examination    2. Primary hypertension    3. Mixed hyperlipidemia    4. Paroxysmal atrial fibrillation    5. Chronic combined systolic and diastolic heart failure    6. Chronic kidney disease, stage 3a        Plan:       Wellness examination    Primary hypertension  -     Comprehensive Metabolic Panel; Future; Expected date: 11/08/2023  -     CBC Without Differential; Future; Expected date: 11/08/2023  -     Lipid Panel; Future; Expected date: 11/08/2023  -     TSH; Future; Expected date: 11/08/2023    Mixed hyperlipidemia  -     Comprehensive Metabolic Panel; Future; Expected date: 11/08/2023  -     CBC Without Differential; Future; Expected date: 11/08/2023  -     Lipid Panel; Future; Expected date: 11/08/2023  -     TSH; Future; Expected date:  11/08/2023    Paroxysmal atrial fibrillation    Chronic combined systolic and diastolic heart failure    Chronic kidney disease, stage 3a      Htn stable  Ckd stable  Lipid stable  Reviewed meds  Will monitor chronic medical issues and continue current plan of care.    Follow up in about 6 months (around 5/8/2024), or if symptoms worsen or fail to improve.

## 2023-11-13 RX ORDER — RIVAROXABAN 20 MG/1
20 TABLET, FILM COATED ORAL
Qty: 31 TABLET | Refills: 11 | Status: CANCELLED | OUTPATIENT
Start: 2023-11-13

## 2023-11-14 NOTE — TELEPHONE ENCOUNTER
Mom advised Pt wanted to know if you will send in toprol xl, zoloft, protonix ,potassium all generic #[facility]

## 2023-12-04 ENCOUNTER — OFFICE VISIT (OUTPATIENT)
Dept: OPTOMETRY | Facility: CLINIC | Age: 83
End: 2023-12-04
Payer: MEDICARE

## 2023-12-04 DIAGNOSIS — H35.3131 EARLY DRY STAGE NONEXUDATIVE AGE-RELATED MACULAR DEGENERATION OF BOTH EYES: Primary | ICD-10-CM

## 2023-12-04 DIAGNOSIS — H52.7 REFRACTIVE ERROR: ICD-10-CM

## 2023-12-04 DIAGNOSIS — Z96.1 PSEUDOPHAKIA OF BOTH EYES: ICD-10-CM

## 2023-12-04 PROCEDURE — 92014 COMPRE OPH EXAM EST PT 1/>: CPT | Mod: S$GLB,,, | Performed by: OPTOMETRIST

## 2023-12-04 PROCEDURE — 1160F RVW MEDS BY RX/DR IN RCRD: CPT | Mod: CPTII,S$GLB,, | Performed by: OPTOMETRIST

## 2023-12-04 PROCEDURE — 1126F AMNT PAIN NOTED NONE PRSNT: CPT | Mod: CPTII,S$GLB,, | Performed by: OPTOMETRIST

## 2023-12-04 PROCEDURE — 1159F PR MEDICATION LIST DOCUMENTED IN MEDICAL RECORD: ICD-10-PCS | Mod: CPTII,S$GLB,, | Performed by: OPTOMETRIST

## 2023-12-04 PROCEDURE — 1101F PT FALLS ASSESS-DOCD LE1/YR: CPT | Mod: CPTII,S$GLB,, | Performed by: OPTOMETRIST

## 2023-12-04 PROCEDURE — 92134 CPTRZ OPH DX IMG PST SGM RTA: CPT | Mod: S$GLB,,, | Performed by: OPTOMETRIST

## 2023-12-04 PROCEDURE — 3288F PR FALLS RISK ASSESSMENT DOCUMENTED: ICD-10-PCS | Mod: CPTII,S$GLB,, | Performed by: OPTOMETRIST

## 2023-12-04 PROCEDURE — 99999 PR PBB SHADOW E&M-EST. PATIENT-LVL III: CPT | Mod: PBBFAC,,, | Performed by: OPTOMETRIST

## 2023-12-04 PROCEDURE — 92134 POSTERIOR SEGMENT OCT RETINA (OCULAR COHERENCE TOMOGRAPHY)-BOTH EYES: ICD-10-PCS | Mod: S$GLB,,, | Performed by: OPTOMETRIST

## 2023-12-04 PROCEDURE — 1126F PR PAIN SEVERITY QUANTIFIED, NO PAIN PRESENT: ICD-10-PCS | Mod: CPTII,S$GLB,, | Performed by: OPTOMETRIST

## 2023-12-04 PROCEDURE — 1101F PR PT FALLS ASSESS DOC 0-1 FALLS W/OUT INJ PAST YR: ICD-10-PCS | Mod: CPTII,S$GLB,, | Performed by: OPTOMETRIST

## 2023-12-04 PROCEDURE — 1160F PR REVIEW ALL MEDS BY PRESCRIBER/CLIN PHARMACIST DOCUMENTED: ICD-10-PCS | Mod: CPTII,S$GLB,, | Performed by: OPTOMETRIST

## 2023-12-04 PROCEDURE — 3288F FALL RISK ASSESSMENT DOCD: CPT | Mod: CPTII,S$GLB,, | Performed by: OPTOMETRIST

## 2023-12-04 PROCEDURE — 92015 DETERMINE REFRACTIVE STATE: CPT | Mod: S$GLB,,, | Performed by: OPTOMETRIST

## 2023-12-04 PROCEDURE — 99999 PR PBB SHADOW E&M-EST. PATIENT-LVL III: ICD-10-PCS | Mod: PBBFAC,,, | Performed by: OPTOMETRIST

## 2023-12-04 PROCEDURE — 1159F MED LIST DOCD IN RCRD: CPT | Mod: CPTII,S$GLB,, | Performed by: OPTOMETRIST

## 2023-12-04 PROCEDURE — 92015 PR REFRACTION: ICD-10-PCS | Mod: S$GLB,,, | Performed by: OPTOMETRIST

## 2023-12-04 PROCEDURE — 92014 PR EYE EXAM, EST PATIENT,COMPREHESV: ICD-10-PCS | Mod: S$GLB,,, | Performed by: OPTOMETRIST

## 2023-12-04 NOTE — PROGRESS NOTES
HPI    Pt here for routine exam ELISE 06/02/23    Pt complains of slight blurred vision w specs. Pt denies floaters and   flashes. Pt uses refresh PRN. HTN controlled w aid .   Last edited by Lamin Roberts, OD on 12/4/2023 11:06 AM.            Assessment /Plan     For exam results, see Encounter Report.    Early dry stage nonexudative age-related macular degeneration of both eyes    Pseudophakia of both eyes    Refractive error      Vision stable, cont areds 2 , oct mac stable ou, RTC 6 mos  2.Monitor condition. Patient to report any changes. RTC 1 year recheck.  3.; New Spectacle Rx given, discussed different options for glasses. RTC 1 year routine eye exam.

## 2024-01-06 DIAGNOSIS — I42.2 HYPERTROPHIC NONOBSTRUCTIVE CARDIOMYOPATHY: Primary | ICD-10-CM

## 2024-01-10 RX ORDER — FUROSEMIDE 20 MG/1
20 TABLET ORAL DAILY
Qty: 90 TABLET | Refills: 3 | Status: SHIPPED | OUTPATIENT
Start: 2024-01-10

## 2024-01-22 ENCOUNTER — OFFICE VISIT (OUTPATIENT)
Dept: CARDIOLOGY | Facility: CLINIC | Age: 84
End: 2024-01-22
Payer: MEDICARE

## 2024-01-22 VITALS
HEIGHT: 65 IN | SYSTOLIC BLOOD PRESSURE: 96 MMHG | BODY MASS INDEX: 27.73 KG/M2 | WEIGHT: 166.44 LBS | RESPIRATION RATE: 18 BRPM | DIASTOLIC BLOOD PRESSURE: 54 MMHG | HEART RATE: 59 BPM

## 2024-01-22 DIAGNOSIS — I25.10 CORONARY ARTERY DISEASE INVOLVING NATIVE CORONARY ARTERY OF NATIVE HEART WITHOUT ANGINA PECTORIS: ICD-10-CM

## 2024-01-22 DIAGNOSIS — I48.0 PAROXYSMAL ATRIAL FIBRILLATION: ICD-10-CM

## 2024-01-22 DIAGNOSIS — I70.0 ATHEROSCLEROSIS OF AORTA: ICD-10-CM

## 2024-01-22 DIAGNOSIS — Z95.1 S/P CABG (CORONARY ARTERY BYPASS GRAFT): ICD-10-CM

## 2024-01-22 DIAGNOSIS — I50.42 CHRONIC COMBINED SYSTOLIC AND DIASTOLIC HEART FAILURE: Primary | ICD-10-CM

## 2024-01-22 DIAGNOSIS — Z95.810 ICD (IMPLANTABLE CARDIOVERTER-DEFIBRILLATOR) IN PLACE: ICD-10-CM

## 2024-01-22 PROCEDURE — 99214 OFFICE O/P EST MOD 30 MIN: CPT | Mod: S$GLB,,, | Performed by: INTERNAL MEDICINE

## 2024-01-22 PROCEDURE — 99999 PR PBB SHADOW E&M-EST. PATIENT-LVL IV: CPT | Mod: PBBFAC,,, | Performed by: INTERNAL MEDICINE

## 2024-01-22 PROCEDURE — 1159F MED LIST DOCD IN RCRD: CPT | Mod: CPTII,S$GLB,, | Performed by: INTERNAL MEDICINE

## 2024-01-22 PROCEDURE — 3288F FALL RISK ASSESSMENT DOCD: CPT | Mod: CPTII,S$GLB,, | Performed by: INTERNAL MEDICINE

## 2024-01-22 PROCEDURE — 1101F PT FALLS ASSESS-DOCD LE1/YR: CPT | Mod: CPTII,S$GLB,, | Performed by: INTERNAL MEDICINE

## 2024-01-22 PROCEDURE — 3078F DIAST BP <80 MM HG: CPT | Mod: CPTII,S$GLB,, | Performed by: INTERNAL MEDICINE

## 2024-01-22 PROCEDURE — 3074F SYST BP LT 130 MM HG: CPT | Mod: CPTII,S$GLB,, | Performed by: INTERNAL MEDICINE

## 2024-01-22 PROCEDURE — 1126F AMNT PAIN NOTED NONE PRSNT: CPT | Mod: CPTII,S$GLB,, | Performed by: INTERNAL MEDICINE

## 2024-01-22 PROCEDURE — 1160F RVW MEDS BY RX/DR IN RCRD: CPT | Mod: CPTII,S$GLB,, | Performed by: INTERNAL MEDICINE

## 2024-01-22 NOTE — PROGRESS NOTES
Subjective:    Patient ID:  Sari Biswas is a 83 y.o. female who presents for follow-up of ischemic cardiomyopathy    HPI  She comes with no complaints, no chest pain, no shortness of breath  No cardiac complaints  FC II    Review of Systems   Constitutional: Negative for decreased appetite, malaise/fatigue, weight gain and weight loss.   Cardiovascular:  Negative for chest pain, dyspnea on exertion, leg swelling, palpitations and syncope.   Respiratory:  Negative for cough and shortness of breath.    Gastrointestinal: Negative.    Neurological:  Negative for weakness.   All other systems reviewed and are negative.     Objective:      Physical Exam  Vitals and nursing note reviewed.   Constitutional:       Appearance: Normal appearance. She is well-developed.   HENT:      Head: Normocephalic.   Eyes:      Pupils: Pupils are equal, round, and reactive to light.   Neck:      Thyroid: No thyromegaly.      Vascular: No carotid bruit or JVD.   Cardiovascular:      Rate and Rhythm: Normal rate and regular rhythm.      Chest Wall: PMI is not displaced.      Pulses: Normal pulses and intact distal pulses.      Heart sounds: Normal heart sounds. No murmur heard.     No gallop.   Pulmonary:      Effort: Pulmonary effort is normal.      Breath sounds: Normal breath sounds.   Abdominal:      Palpations: Abdomen is soft. There is no mass.      Tenderness: There is no abdominal tenderness.   Musculoskeletal:         General: Normal range of motion.      Cervical back: Normal range of motion and neck supple.   Skin:     General: Skin is warm.   Neurological:      Mental Status: She is alert and oriented to person, place, and time.      Sensory: No sensory deficit.      Deep Tendon Reflexes: Reflexes are normal and symmetric.         Most Recent EKG Results  Results for orders placed or performed in visit on 07/15/22   IN OFFICE EKG 12-LEAD (to [place])    Collection Time: 07/15/22  3:12 PM    Narrative    Test Reason :  z00.00    Vent. Rate : 074 BPM     Atrial Rate : 074 BPM     P-R Int : 148 ms          QRS Dur : 080 ms      QT Int : 392 ms       P-R-T Axes : 000 -31 098 degrees     QTc Int : 435 ms    Atrial-paced rhythm  Left axis deviation  Nonspecific ST and/or T wave abnormalities  Abnormal ECG  When compared with ECG of 25-JUN-2022 10:48,  No significant change was found  Confirmed by Agustin Gagnon MD ([id number]) on 7/16/2022 2:06:44 PM    Referred By: CONSTANTINO HUGO           Confirmed By:Agustin Gagnon MD       Most Recent Echocardiogram Results  Results for orders placed in visit on 07/11/23    Echo    Interpretation Summary  · The left ventricle is normal in size with mild concentric hypertrophy and moderately decreased systolic function.  · The estimated ejection fraction is 35%.  · Grade I left ventricular diastolic dysfunction.  · Normal right ventricular size with normal right ventricular systolic function.  · Mild mitral regurgitation.  · Mild to moderate tricuspid regurgitation.  · Normal central venous pressure (3 mmHg).  · The estimated PA systolic pressure is 33 mmHg.      Most Recent Nuclear Stress Test Results  No results found for this or any previous visit.      Most Recent Cardiac PET Stress Test Results  No results found for this or any previous visit.      Most Recent Cardiovascular Angiogram results  Results for orders placed during the hospital encounter of 07/11/18    Cardiac catheterization    Narrative  · Female patient 78 years old who has multivessel coronary disease including distal left main with heavy calcification and the LAD diagonal bifurcation with heavy calcification. Symptomatic angina.  · Given option for surgical revascularization after diagnostic procedure. Lengthy discussion with patient and  in the office explaining her syntax score. Her syntax score was around 27. This shows more favorable outcome with surgical revascularization. Patient was not interested in surgical  revascularization or getting a surgical opinion. She and sanjuanita declined surgical consult.  · Explained to her the complexity of coronary intervention including the possible need for mechanical circulatory support. Also explained the risk of procedure including rotational atherectomy which would be necessary for complete revascularization.  · Again all of this is explained to the patient and her  in an office meeting that occurred about a week or so ago. We gave the patient the option of having this done early in the morning at another facility. However the patient's  was not able to drive to that facility to the procedure was scheduled at [facility] in the afternoon on today.  · Patient the catheterization lab in fasting state. Anesthesia sedated her for the procedure given the complexity but was necessary. We accessed the radial artery on the right and the femoral artery on the right. Ultrasound guidance was used for femoral artery access on the right. A 6 Danish sheath was placed in anticipation of possible impeller need  · He is in a 7 Danish system in the right radial artery after 80 units per kilogram heparin was given 2000 which through the radial cocktail a CLS 3.5 guiding catheter was used to engage left main  · Angiography demonstrates distal left main disease which is confirmed by intravascular ultrasound and angiographic assessment done about 2 weeks ago. Left main into LAD not involving the ostial circumflex. Again that was confirmed by intravascular ultrasound  · There was an LAD diagonal bifurcation lesion have a calcified. More calcification in the LAD than in the diagonal  · Using an over-the-wire system with advanced a floppy Rotablator wire into the distal LAD. We used a soft wire and then an over-the-wire balloon to exchanged out for the floppy rotational atherectomy wire. The balloon was withdrawn. We did rotational atherectomy of the mid LAD and the distal left main  into the proximal LAD. 2 separate runs for about 30 seconds at a 6 cyclic speed of 160,000revoutions.  · No hemodynamic embarrassment at this point. Blood pressure remained stable. ACT came back well above 250 seconds.  · We did give one more thousand units of heparin during the procedure when the ACT came back at around 210-220. I did we would be doing the intervention for a little while longer 70 to the ACT a little higher.  · Once rotational atherectomy was done we advanced 2 separate wires into the LAD and diagonal respectively. A  50 the diagonal and a whisper extra-support the LAD.  · Our intention was to do a crush technique. We we advanced a 3.5 x 38 stent into the LAD but it was not able to go far enough. Furthermore once LAD stent was in position we were not able to get her diagonal stent in.  · Therefore we withdrew the LAD stents leaving our wire in the LAD and diagonal. We did balloon angioplasty of the ostial diagonal with a 3.5 and 4.0 noncompliant balloons with good result.  · We then tried advancing a 0.75 Synergy stent to the diagonal but would not go past the proximal or ostial segment. Therefore we withdrew the stent from the diagonal and advance a stent to the LAD. The intention at this point was because the ostium of the diagonal looked acceptable we would stented the LAD and recross into the diagonal.  · Once he stented the LAD with a 3.5 x 30 8I noticed that her ST segments were elevated. At this point I expected the diagonal would be having slow flow.  · Angiographically there was no flow past the mid segment of the diagonal. I rapidly rewired the vessel.  · At this point when angiographic assessment was done there was a perforation in the LAD. I knew that she was going to have pericardial fluid and tamponade  · At this point we rapidly did a pericardial access with fluoroscopic guidance. I was able to negotiate a catheter into the pericardial space and withdrew about 700 mL of  blood.  · We autotransfuser split into the patient.  · Anesthesia was present and I advised them to intubate her and manage her hemodynamics which they did. Pressors were started including epinephrine and levo fed as well as Luís-Synephrine.  · I then placed iIMPELLA catheter via right femoral artery access. This was an IMPELLA CP  · We then continued aspirating blood off the pericardial space. At this point we did multiple balloon inflations for 6-10 minutes in the proximal LAD to try to help to close off the perforation. This however was not successful. Therefore placed a 2.5 covered stent in the distal LAD. She had persistent bleeding but ultimately the bleeding did stop.  · I needed to guide liner to deliver the covered stent.  · With the covered stent in place she had GABRIELE 2 flow into the distal LAD at the end of the procedure with what appeared to be thrombus in the LAD. However at this point we had held off on giving any further heparin and I did start her initially on IIb IIIa inhibitor once the slow flow in the diagonal was noted but it was stopped immediately once the perforation was noted.  · Surgery had been called and were available and in the room at the time the decision was made to take her to the operating room.\  · She was hemodynamically stable at this time with a good rhythm. Blood pressure was stable. She was on pressors but systolic pressure was well above 100.  · We had to do chest compressions for about 1 minutes. She also had to be defibrillated once. However her airway was accessed immediately and the pericardial effusion was drained immediately.  · She went to the OR in critical condition however.  · I explained to the patient's family the occurrences. Explained this is unfortunately a given risk of this complex type of intervention but that everything was done in an expedient fashion to try to restore circulation and try to manage the perforation.  · We are hopeful that she will make a  meaningful recovery.  · It is possible that her LV function will be affected by these events.    I certify that I was present for catheter insertion, catheter manipulation, angiography, and angiographic interpretation of this patient.      Procedure Log documented by Documenter: Liz Ward RN and verified by Kumar Colon.    Date: 7/11/2018  Time: 6:27 PM      Other Most Recent Cardiology Results  Results for orders placed in visit on 11/05/23    Cardiac device check - Remote    Narrative  Battery and Leads (BL)  Normal parameters noted on battery and lead(s)  Auto-threshold measurement unavailable or programmed off on lead(s)    Presenting Rhythm (MO)  Atrial Pacing-Ventricular Sensing (AP-VS)    Arrhythmic events (AE)  Nonsustained SVT event(s) identified  Device-identified arrhythmic events without corresponding EGMs and/or details noted    Cardiovascular Physiologic Parameters (CPP)  No abnormalities in physiologic parameters identified    Transmission Information (TI)  Device Summary Report    Follow Up (FU)  Continue remote monitoring with quarterly reporting    Additional Comments  ICD Report  Monitoring period:  6/14/23 - 9/13/23    Battery/Lead Status:  MOS1/50%    Ap: 92%  : 0%    Device Defined Counters:  Atrial Fibrillation/Flutter: up to 1 per day  Trending illustrates atrial events without EGMs.  AF [place]:  0%    Ventricular Events: 1  EGMs illustrate SVT, longest available 3.5sec in duration.      Labs reviewed    Assessment:       1. Chronic combined systolic and diastolic heart failure    2. S/P CABG (coronary artery bypass graft)    3. Coronary artery disease involving native coronary artery of native heart without angina pectoris    4. ICD (implantable cardioverter-defibrillator) in place         Plan:     Continue:  Antiarrhythmic, Antiplatelet, ARNi, Diuretic, DOAC, MRA, and Statin  Regular exercise program  Weight loss  Low cholesterol diet    6 m f/u with YISSEL Andino

## 2024-02-03 DIAGNOSIS — L21.9 SEBORRHEIC DERMATITIS: ICD-10-CM

## 2024-02-03 DIAGNOSIS — E78.5 HYPERLIPIDEMIA, UNSPECIFIED HYPERLIPIDEMIA TYPE: ICD-10-CM

## 2024-02-04 ENCOUNTER — HOSPITAL ENCOUNTER (OUTPATIENT)
Dept: CARDIOLOGY | Facility: HOSPITAL | Age: 84
Discharge: HOME | End: 2024-02-04
Attending: INTERNAL MEDICINE
Payer: MEDICARE

## 2024-02-04 DIAGNOSIS — L21.9 SEBORRHEIC DERMATITIS: ICD-10-CM

## 2024-02-04 PROCEDURE — 93296 REM INTERROG EVL PM/IDS: CPT | Mod: PO | Performed by: INTERNAL MEDICINE

## 2024-02-04 PROCEDURE — 93295 DEV INTERROG REMOTE 1/2/MLT: CPT | Mod: ,,, | Performed by: INTERNAL MEDICINE

## 2024-02-05 RX ORDER — FLUOCINONIDE TOPICAL SOLUTION USP, 0.05% 0.5 MG/ML
SOLUTION TOPICAL 2 TIMES DAILY
Qty: 60 ML | Refills: 3 | OUTPATIENT
Start: 2024-02-05

## 2024-02-05 RX ORDER — KETOCONAZOLE 20 MG/ML
SHAMPOO, SUSPENSION TOPICAL
Qty: 120 ML | Refills: 6 | OUTPATIENT
Start: 2024-02-05

## 2024-02-05 RX ORDER — SACUBITRIL AND VALSARTAN 24; 26 MG/1; MG/1
1 TABLET, FILM COATED ORAL 2 TIMES DAILY
Qty: 180 TABLET | Refills: 3 | Status: SHIPPED | OUTPATIENT
Start: 2024-02-05

## 2024-02-05 RX ORDER — ROSUVASTATIN CALCIUM 5 MG/1
5 TABLET, COATED ORAL NIGHTLY
Qty: 90 TABLET | Refills: 3 | Status: SHIPPED | OUTPATIENT
Start: 2024-02-05 | End: 2025-02-04

## 2024-02-12 DIAGNOSIS — I25.10 CORONARY ARTERY DISEASE INVOLVING NATIVE CORONARY ARTERY OF NATIVE HEART WITHOUT ANGINA PECTORIS: Primary | ICD-10-CM

## 2024-02-12 DIAGNOSIS — L21.9 SEBORRHEIC DERMATITIS: ICD-10-CM

## 2024-02-12 RX ORDER — SPIRONOLACTONE 25 MG/1
12.5 TABLET ORAL DAILY
Qty: 46 TABLET | Refills: 3 | Status: SHIPPED | OUTPATIENT
Start: 2024-02-12

## 2024-02-12 RX ORDER — FLUOCINONIDE TOPICAL SOLUTION USP, 0.05% 0.5 MG/ML
SOLUTION TOPICAL 2 TIMES DAILY
Qty: 60 ML | Refills: 0 | Status: SHIPPED | OUTPATIENT
Start: 2024-02-12 | End: 2024-04-20 | Stop reason: SDUPTHER

## 2024-03-02 ENCOUNTER — PATIENT MESSAGE (OUTPATIENT)
Dept: CARDIOLOGY | Facility: CLINIC | Age: 84
End: 2024-03-02
Payer: MEDICARE

## 2024-03-14 ENCOUNTER — TELEPHONE (OUTPATIENT)
Dept: FAMILY MEDICINE | Facility: CLINIC | Age: 84
End: 2024-03-14

## 2024-03-14 NOTE — TELEPHONE ENCOUNTER
----- Message from Bharati Shar sent at 3/14/2024 10:05 AM CDT -----  Regarding: letter for [employer]  Contact: pt  Type:  Needs Medical Advice    Who Called:  pt    Best Call Back Number: [phone]    Additional Information: pt needs a letter from the doctor stating that she has met her weight loss goals for [employer].  It can be emailed to pt  [email or web address] and she will bring it in.  This qualifies her for lifetime membership in weight [employer].  Please advise.  Thank you.

## 2024-03-28 ENCOUNTER — TELEPHONE (OUTPATIENT)
Dept: FAMILY MEDICINE | Facility: CLINIC | Age: 84
End: 2024-03-28
Payer: MEDICARE

## 2024-03-28 NOTE — TELEPHONE ENCOUNTER
----- Message from Issac Hankins sent at 3/28/2024 10:15 AM CDT -----  Regarding: advice  Contact: patient  Type: Needs Medical Advice  Who Called:  Patient   Symptoms (please be specific):    How long has patient had these symptoms:    Pharmacy name and phone #:    Best Call Back Number: [phone]  Additional Information: pt is following up on her last message sent to the provider on 03/14. She stated that she never received the letter in her email. Please call to advice. Thanks

## 2024-04-15 ENCOUNTER — OFFICE VISIT (OUTPATIENT)
Dept: DERMATOLOGY | Facility: CLINIC | Age: 84
End: 2024-04-15
Payer: MEDICARE

## 2024-04-15 DIAGNOSIS — D22.9 MULTIPLE BENIGN NEVI: ICD-10-CM

## 2024-04-15 DIAGNOSIS — Z12.83 SCREENING FOR SKIN CANCER: ICD-10-CM

## 2024-04-15 DIAGNOSIS — D18.01 CHERRY ANGIOMA: ICD-10-CM

## 2024-04-15 DIAGNOSIS — L81.4 LENTIGINES: ICD-10-CM

## 2024-04-15 DIAGNOSIS — L82.1 SK (SEBORRHEIC KERATOSIS): ICD-10-CM

## 2024-04-15 DIAGNOSIS — L91.8 ACROCHORDON: ICD-10-CM

## 2024-04-15 DIAGNOSIS — L57.0 AK (ACTINIC KERATOSIS): ICD-10-CM

## 2024-04-15 DIAGNOSIS — L72.0 MILIA: ICD-10-CM

## 2024-04-15 DIAGNOSIS — L73.8 SEBACEOUS HYPERPLASIA: ICD-10-CM

## 2024-04-15 DIAGNOSIS — Z85.828 HISTORY OF SKIN CANCER: ICD-10-CM

## 2024-04-15 DIAGNOSIS — L21.9 SEBORRHEIC DERMATITIS: Primary | ICD-10-CM

## 2024-04-15 DIAGNOSIS — L40.8 SEBOPSORIASIS: ICD-10-CM

## 2024-04-15 PROCEDURE — 99214 OFFICE O/P EST MOD 30 MIN: CPT | Mod: 25,S$GLB,, | Performed by: DERMATOLOGY

## 2024-04-15 PROCEDURE — 3288F FALL RISK ASSESSMENT DOCD: CPT | Mod: CPTII,S$GLB,, | Performed by: DERMATOLOGY

## 2024-04-15 PROCEDURE — 17000 DESTRUCT PREMALG LESION: CPT | Mod: S$GLB,,, | Performed by: DERMATOLOGY

## 2024-04-15 PROCEDURE — 1101F PT FALLS ASSESS-DOCD LE1/YR: CPT | Mod: CPTII,S$GLB,, | Performed by: DERMATOLOGY

## 2024-04-15 PROCEDURE — 99999 PR PBB SHADOW E&M-EST. PATIENT-LVL III: CPT | Mod: PBBFAC,,, | Performed by: DERMATOLOGY

## 2024-04-15 PROCEDURE — 1159F MED LIST DOCD IN RCRD: CPT | Mod: CPTII,S$GLB,, | Performed by: DERMATOLOGY

## 2024-04-15 RX ORDER — CLOBETASOL PROPIONATE 0.05 G/100ML
SHAMPOO TOPICAL
Qty: 118 ML | Refills: 5 | Status: SHIPPED | OUTPATIENT
Start: 2024-04-15

## 2024-04-15 NOTE — PATIENT INSTRUCTIONS
CRYOSURGERY      Your doctor has used a method called cryosurgery to treat your skin condition. Cryosurgery refers to the use of very cold substances to treat a variety of skin conditions such as warts, pre-skin cancers, molluscum contagiosum, sun spots, and several benign growths. The substance we use in cryosurgery is liquid nitrogen and is so cold (-195 degrees Celsius) that is burns when administered.     Following treatment in the office, the skin may immediately burn and become red. You may find the area around the lesion is affected as well. It is sometimes necessary to treat not only the lesion, but a small area of the surrounding normal skin to achieve a good response.     A blister, and even a blood filled blister, may form after treatment.   This is a normal response. If the blister is painful, it is acceptable to sterilize a needle and with rubbing alcohol and gently pop the blister. It is important that you gently wash the area with soap and warm water as the blister fluid may contain wart virus if a wart was treated. Do no remove the roof of the blister.     The area treated can take anywhere from 1-3 weeks to heal. Healing time depends on the kind skin lesion treated, the location, and how aggressively the lesion was treated. It is recommended that the areas treated are covered with Vaseline or bacitracin ointment and a band-aid. If a band-aid is not practical, just ointment applied several times per day will do. Keeping these areas moist will speed the healing time.    Treatment with liquid nitrogen can leave a scar. In dark skin, it may be a light or dark scar, in light skin it may be a white or pink scar. These will generally fade with time, but may never go away completely.     If you have any concerns after your treatment, please feel free to call the office.       [address] [facility] [place] [address]/ [phone] FAX/ [email or web address] What Are the Symptoms of Skin  Cancer?  A change in your skin is the most common sign of skin cancer. This could be a new growth, a sore that doesnt heal, or a change in a mole. Not all skin cancers look the same.    For melanoma specifically, a simple way to remember the warning signs is to remember the A-B-C-D-Es of melanoma--    A stands for asymmetrical. Does the mole or spot have an irregular shape with two parts that look very different?  B stands for border. Is the border irregular or jagged?  C is for color. Is the color uneven?  D is for diameter. Is the mole or spot larger than the size of a pea?  E is for evolving. Has the mole or spot changed during the past few weeks or months?    Talk to your doctor if you notice changes in your skin such as a new growth, a sore that doesnt heal, a change in an old growth, or any of the A-B-C-D-Es of melanoma    What Can I Do to Reduce My Risk of Skin Cancer?  Protection from ultraviolet (UV) radiation is important all year, not just during the summer or at the beach. UV rays from the sun can reach you on cloudy and hazy days, not just on bright and zeina days. UV rays also reflect off of surfaces like water, cement, sand, and snow. Indoor tanning (using a tanning bed, hobbs, or sunlamp to get tan) exposes users to UV radiation.    The hours between 10 a.m. and 4 p.m. Daylight Saving Time (9 a.m. to 3 p.m. standard time) are the most hazardous for UV exposure outdoors in the continental [place]. UV rays from sunlight are the greatest during the late spring and early summer in [place].    CDC recommends easy options for protection from UV radiation--    Stay in the shade or indoors, especially during midday hours.  Wear clothing that covers your arms and legs.  Wear a hat with a wide brim to shade your face, head, ears, and neck.  Wear sunglasses that wrap around and block both UVA and UVB rays.  Use sunscreen with a sun protection factor (SPF) of 30 or higher, and both UVA  and UVB (broad spectrum) protection.  Avoid indoor tanning.    Adapted from https://www.cdc.gov/cancer/skin/basic_info/

## 2024-04-15 NOTE — Clinical Note
I told this patient 1 year tbse but forgot to mention I wanted to recheck a noted spot at the forehead in 3-6 months to ensure no atypical change. Please schedule thank you

## 2024-04-15 NOTE — PROGRESS NOTES
"  Subjective:       Patient ID:  Sari Biswas is a 83 y.o. female who presents for   Chief Complaint   Patient presents with    Skin Check     TBSE.  C/o "spots" all over.      Medication Refill     HPI    Established patient.  Here today for total body skin exam.   Hx of skin cancer as below.   C/o multiple spots but no specific concerns.     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 Using ketoconazole and clobetasol shampoo, Lidex solution for scalp. Also using ketoconazole and HCT creams for flares at brows, ears. Improved with twice weekly washing (previously washing only once weekly). Also some improvement with clobetasol shampoo incorporation but still having some difficulty despite use of topicals.     Review of Systems   Constitutional:  Negative for fever, chills and fatigue.   Respiratory:  Negative for cough and shortness of breath.    Skin:  Positive for activity-related sunscreen use.   Hematologic/Lymphatic: Bruises/bleeds easily (plavix, xarelto).        Objective:    Physical Exam   Constitutional: She appears well-developed and well-nourished. No distress.   Neurological: She is alert and oriented to person, place, and time. She is not disoriented.   Psychiatric: She has a normal mood and affect.   Skin:   Areas Examined (abnormalities noted in diagram):   Scalp / Hair Palpated and Inspected  Head / Face Inspection Performed  Neck Inspection Performed  Chest / Axilla Inspection Performed  Abdomen Inspection Performed  Genitals / Buttocks / Groin Inspection Performed  Back Inspection Performed  RUE Inspected  LUE Inspection Performed  RLE Inspected  LLE Inspection Performed  Nails and Digits Inspection Performed                   Diagram Legend     Erythematous scaling macule/papule c/w actinic keratosis       Vascular papule c/w angioma      Pigmented verrucoid papule/plaque c/w seborrheic keratosis "      Yellow umbilicated papule c/w sebaceous hyperplasia      Irregularly shaped tan macule c/w lentigo     1-2 mm smooth white papules consistent with Milia      Movable subcutaneous cyst with punctum c/w epidermal inclusion cyst      Subcutaneous movable cyst c/w pilar cyst      Firm pink to brown papule c/w dermatofibroma      Pedunculated fleshy papule(s) c/w skin tag(s)      Evenly pigmented macule c/w junctional nevus     Mildly variegated pigmented, slightly irregular-bordered macule c/w mildly atypical nevus      Flesh colored to evenly pigmented papule c/w intradermal nevus       Pink pearly papule/plaque c/w basal cell carcinoma      Erythematous hyperkeratotic cursted plaque c/w SCC      Surgical scar with no sign of skin cancer recurrence      Open and closed comedones      Inflammatory papules and pustules      Verrucoid papule consistent consistent with wart     Erythematous eczematous patches and plaques     Dystrophic onycholytic nail with subungual debris c/w onychomycosis     Umbilicated papule    Erythematous-base heme-crusted tan verrucoid plaque consistent with inflamed seborrheic keratosis     Erythematous Silvery Scaling Plaque c/w Psoriasis     See annotation      Assessment / Plan:      AK (actinic keratosis)  - Discussed diagnosis, etiology, and precancerous nature of condition.   - Cryosurgery Procedure Note: Discussed procedure with patient/patient's guardian including risks and benefits as well as treatment alternatives. Risks of procedure include pain, itching, swelling, redness, blistering, crusting, wound formation, post-inflammatory pigmentary alteration, scar, recurrence. Verbal consent obtained. LN2 cryosurgery performed to 1 lesion(s). Patient tolerated procedure well. After-visit wound care instructions reviewed and provided in writing.      Multiple benign nevi  - Discussed diagnosis, etiology, and benign-nature of condition.  - Reassured; no lesions suspicious for malignancy  noted on exam today.   - Recommended routine self examination of skin. Discussed the ABCDEs of melanoma and ugly duckling sign.   - Recommended daily sun protection, including the use of OTC broad-spectrum sunscreen (SPF 30 or greater) and sun-protective clothing.    Suspect lesion at forehead to be small blue nevus, benign-appearing today ; measured for continued monitoring. Recheck 3-6 months to ensure no atypical change.     Lentigines  - Benign; reassured treatment not necessary.   - Recommended daily sun protection, including the use of OTC broad-spectrum sunscreen (SPF 30 or greater) and sun-protective clothing.       SK (seborrheic keratosis)  Acrochordon  Cherry angioma  Sebaceous hyperplasia  Milia  - Benign; reassured treatment not necessary.      History of skin cancer  Screening for skin cancer  - Total body skin examination performed today.  - Findings listed above.   - Sites of prior malignancy examined - no concern for recurrence today.    - Recommended routine self examination of skin.    - Recommended daily sun protection, including the use of OTC broad-spectrum sunscreen (SPF 30 or greater) and sun-protective clothing.        Seborrheic dermatitis  -     roflumilast 0.3 % Foam; Apply to affected area on  scalp once daily as needed for rash and itching.  Dispense: 60 g; Refill: 5  -     clobetasoL (CLOBEX) 0.05 % shampoo; Wash scalp once weekly. Avoid use of medication on face, body folds, groin/genitalia.  Dispense: 118 mL; Refill: 5  Sebopsoriasis  -     clobetasoL (CLOBEX) 0.05 % shampoo; Wash scalp once weekly. Avoid use of medication on face, body folds, groin/genitalia.  Dispense: 118 mL; Refill: 5  Improved but still experiencing issues.     For scalp:  - Wash scalp at least every other day, alternating use of ketoconazole shampoo and rx clobetasol shampoo. Allow shampoo to sit for at least 5 minutes prior to rinsing.   - Apply new Rx roflumilast 0.3% foam solution daily PRN rash, itching at  scalp.      For ears, brows:  - Mix ketoconazole and HCT cream BID PRN rash, itching at ears, brows  - Consider use of ketoconazole cream once daily to rash / itch prone areas for prevention.   - Counseled on potential SE of medication(s) and instructed on use.        Spot check 3-6 months  TBSE 1 year  PRN sooner sebopsoriasis / other concern

## 2024-04-20 DIAGNOSIS — L21.9 SEBORRHEIC DERMATITIS: ICD-10-CM

## 2024-04-20 RX ORDER — FLUOCINONIDE TOPICAL SOLUTION USP, 0.05% 0.5 MG/ML
SOLUTION TOPICAL 2 TIMES DAILY
Qty: 60 ML | Refills: 0 | Status: CANCELLED | OUTPATIENT
Start: 2024-04-20

## 2024-04-21 DIAGNOSIS — L21.9 SEBORRHEIC DERMATITIS: ICD-10-CM

## 2024-04-22 RX ORDER — FLUOCINONIDE TOPICAL SOLUTION USP, 0.05% 0.5 MG/ML
SOLUTION TOPICAL 2 TIMES DAILY
Qty: 60 ML | Refills: 0 | Status: SHIPPED | OUTPATIENT
Start: 2024-04-22

## 2024-05-04 ENCOUNTER — CLINICAL SUPPORT (OUTPATIENT)
Dept: CARDIOLOGY | Facility: HOSPITAL | Age: 84
End: 2024-05-04
Payer: MEDICARE

## 2024-05-04 DIAGNOSIS — Z95.810 PRESENCE OF AUTOMATIC (IMPLANTABLE) CARDIAC DEFIBRILLATOR: ICD-10-CM

## 2024-05-05 ENCOUNTER — HOSPITAL ENCOUNTER (OUTPATIENT)
Dept: CARDIOLOGY | Facility: HOSPITAL | Age: 84
Discharge: HOME | End: 2024-05-05
Attending: INTERNAL MEDICINE
Payer: MEDICARE

## 2024-05-05 PROCEDURE — 93295 DEV INTERROG REMOTE 1/2/MLT: CPT | Mod: ,,, | Performed by: INTERNAL MEDICINE

## 2024-05-05 PROCEDURE — 93296 REM INTERROG EVL PM/IDS: CPT | Mod: PO | Performed by: INTERNAL MEDICINE

## 2024-05-09 LAB
OHS CV AF BURDEN PERCENT: < 1
OHS CV DC REMOTE DEVICE TYPE: NORMAL
OHS CV RV PACING PERCENT: 0 %

## 2024-05-21 ENCOUNTER — OFFICE VISIT (OUTPATIENT)
Dept: PODIATRY | Facility: CLINIC | Age: 84
End: 2024-05-21
Payer: MEDICARE

## 2024-05-21 VITALS — WEIGHT: 166.44 LBS | BODY MASS INDEX: 27.73 KG/M2 | HEIGHT: 65 IN

## 2024-05-21 DIAGNOSIS — L60.3 NAIL DYSTROPHY: Primary | ICD-10-CM

## 2024-05-21 DIAGNOSIS — L60.8 PINCER NAIL DEFORMITY: ICD-10-CM

## 2024-05-21 PROCEDURE — 1160F RVW MEDS BY RX/DR IN RCRD: CPT | Mod: CPTII,S$GLB,, | Performed by: STUDENT IN AN ORGANIZED HEALTH CARE EDUCATION/TRAINING PROGRAM

## 2024-05-21 PROCEDURE — 1126F AMNT PAIN NOTED NONE PRSNT: CPT | Mod: CPTII,S$GLB,, | Performed by: STUDENT IN AN ORGANIZED HEALTH CARE EDUCATION/TRAINING PROGRAM

## 2024-05-21 PROCEDURE — 1101F PT FALLS ASSESS-DOCD LE1/YR: CPT | Mod: CPTII,S$GLB,, | Performed by: STUDENT IN AN ORGANIZED HEALTH CARE EDUCATION/TRAINING PROGRAM

## 2024-05-21 PROCEDURE — 99213 OFFICE O/P EST LOW 20 MIN: CPT | Mod: S$GLB,,, | Performed by: STUDENT IN AN ORGANIZED HEALTH CARE EDUCATION/TRAINING PROGRAM

## 2024-05-21 PROCEDURE — 3288F FALL RISK ASSESSMENT DOCD: CPT | Mod: CPTII,S$GLB,, | Performed by: STUDENT IN AN ORGANIZED HEALTH CARE EDUCATION/TRAINING PROGRAM

## 2024-05-21 PROCEDURE — 1159F MED LIST DOCD IN RCRD: CPT | Mod: CPTII,S$GLB,, | Performed by: STUDENT IN AN ORGANIZED HEALTH CARE EDUCATION/TRAINING PROGRAM

## 2024-05-21 PROCEDURE — 99999 PR PBB SHADOW E&M-EST. PATIENT-LVL III: CPT | Mod: PBBFAC,,, | Performed by: STUDENT IN AN ORGANIZED HEALTH CARE EDUCATION/TRAINING PROGRAM

## 2024-05-21 NOTE — PROGRESS NOTES
Subjective:      Patient ID: Sari Biswas is a 83 y.o. female.    Chief Complaint: Nail Problem    Ms. Biswas presents today with problems to her b/l 2nd toes, L>R. The nails grow thickened and they eventually become painful until she trims them but even then, the improvement is minimal.     Review of Systems   Skin:  Positive for nail changes.   All other systems reviewed and are negative.          Objective:      Physical Exam  Cardiovascular:      Pulses:           Dorsalis pedis pulses are 2+ on the right side and 2+ on the left side.        Posterior tibial pulses are 2+ on the right side and 2+ on the left side.   Feet:      Comments: 2nd toes are the longest toes. There is pincer nail deformity to both 2nd toes L is more dystrophic than the R.                Assessment:       Encounter Diagnoses   Name Primary?    Nail dystrophy Yes    Pincer nail deformity          Plan:       Sari was seen today for nail problem.    Diagnoses and all orders for this visit:    Nail dystrophy    Pincer nail deformity      I counseled the patient on her conditions, their implications and medical management.    Recommend trimming the nail shorter and removal if there are any serious complications with the thickened nail. I trimmed them down today and they feel less painful immediately.  F/u as needed.    Adriano Hall DPM

## 2024-06-05 ENCOUNTER — OFFICE VISIT (OUTPATIENT)
Dept: FAMILY MEDICINE | Facility: CLINIC | Age: 84
End: 2024-06-05
Payer: MEDICARE

## 2024-06-05 VITALS
HEART RATE: 64 BPM | OXYGEN SATURATION: 95 % | SYSTOLIC BLOOD PRESSURE: 102 MMHG | BODY MASS INDEX: 26.04 KG/M2 | HEIGHT: 65 IN | DIASTOLIC BLOOD PRESSURE: 60 MMHG | WEIGHT: 156.31 LBS

## 2024-06-05 DIAGNOSIS — I10 PRIMARY HYPERTENSION: Primary | ICD-10-CM

## 2024-06-05 DIAGNOSIS — N18.31 CHRONIC KIDNEY DISEASE, STAGE 3A: ICD-10-CM

## 2024-06-05 DIAGNOSIS — E78.2 MIXED HYPERLIPIDEMIA: ICD-10-CM

## 2024-06-05 DIAGNOSIS — M81.0 OSTEOPOROSIS, UNSPECIFIED OSTEOPOROSIS TYPE, UNSPECIFIED PATHOLOGICAL FRACTURE PRESENCE: ICD-10-CM

## 2024-06-05 DIAGNOSIS — Z13.820 SCREENING FOR OSTEOPOROSIS: ICD-10-CM

## 2024-06-05 PROCEDURE — 99214 OFFICE O/P EST MOD 30 MIN: CPT | Mod: S$GLB,,, | Performed by: FAMILY MEDICINE

## 2024-06-05 PROCEDURE — 3074F SYST BP LT 130 MM HG: CPT | Mod: CPTII,S$GLB,, | Performed by: FAMILY MEDICINE

## 2024-06-05 PROCEDURE — 99999 PR PBB SHADOW E&M-EST. PATIENT-LVL IV: CPT | Mod: PBBFAC,,, | Performed by: FAMILY MEDICINE

## 2024-06-05 PROCEDURE — G2211 COMPLEX E/M VISIT ADD ON: HCPCS | Mod: S$GLB,,, | Performed by: FAMILY MEDICINE

## 2024-06-05 PROCEDURE — 1126F AMNT PAIN NOTED NONE PRSNT: CPT | Mod: CPTII,S$GLB,, | Performed by: FAMILY MEDICINE

## 2024-06-05 PROCEDURE — 1159F MED LIST DOCD IN RCRD: CPT | Mod: CPTII,S$GLB,, | Performed by: FAMILY MEDICINE

## 2024-06-05 PROCEDURE — 3078F DIAST BP <80 MM HG: CPT | Mod: CPTII,S$GLB,, | Performed by: FAMILY MEDICINE

## 2024-06-05 PROCEDURE — 3288F FALL RISK ASSESSMENT DOCD: CPT | Mod: CPTII,S$GLB,, | Performed by: FAMILY MEDICINE

## 2024-06-05 PROCEDURE — 1101F PT FALLS ASSESS-DOCD LE1/YR: CPT | Mod: CPTII,S$GLB,, | Performed by: FAMILY MEDICINE

## 2024-06-05 NOTE — LETTER
June 5, 2024        Sari Biswas  [address] [address]             [facility]  [address] [place] [address]  Phone: [phone]  Fax: [phone]   Patient: Sari Biswas   MR Number: [id number]   YOB: 1940   Date of Visit: 6/5/2024     To whom it may concern,     Mrs. Biswas has a goal weight of 160 lbs at this time.                Sincerely,            HOWARD Fermin MD            CC  No Recipients    Enclosure

## 2024-06-05 NOTE — PATIENT INSTRUCTIONS
Oscal or citracal plus D  600 mg calcium and 1000 IU of vit D 3  Daily moisturizer such as eucerin (tub) daily for hands/nails

## 2024-06-05 NOTE — PROGRESS NOTES
Subjective:       Patient ID: Sari Biswas is a 84 y.o. female.    Chief Complaint: Annual Exam and Letter    Here for follow up multiple chronic medical issues. Doing well overall and in normal state of health.        Review of Systems   Constitutional:  Negative for chills and fever.   Respiratory:  Negative for cough, chest tightness and shortness of breath.    Cardiovascular:  Negative for chest pain, palpitations and leg swelling.   Endocrine: Negative for cold intolerance and heat intolerance.   Psychiatric/Behavioral:  Negative for decreased concentration. The patient is not nervous/anxious.        Objective:      Physical Exam  Vitals and nursing note reviewed.   Constitutional:       Appearance: She is well-developed.   HENT:      Head: Normocephalic and atraumatic.   Cardiovascular:      Rate and Rhythm: Normal rate and regular rhythm.      Heart sounds: Normal heart sounds.   Pulmonary:      Effort: Pulmonary effort is normal.      Breath sounds: Normal breath sounds.   Psychiatric:         Mood and Affect: Mood normal.         Behavior: Behavior normal.         Assessment:       1. Primary hypertension    2. Mixed hyperlipidemia    3. Osteoporosis, unspecified osteoporosis type, unspecified pathological fracture presence    4. Screening for osteoporosis        Plan:       Primary hypertension    Mixed hyperlipidemia    Osteoporosis, unspecified osteoporosis type, unspecified pathological fracture presence  -     DXA Bone Density Axial Skeleton 1 or more sites; Future; Expected date: 06/05/2024    Screening for osteoporosis  -     DXA Bone Density Axial Skeleton 1 or more sites; Future; Expected date: 06/05/2024        Visit today included increased complexity associated with the care of the episodic problem htn addressed and managing the longitudinal care of the patient due to the serious and/or complex managed problem(s) as above.  Will monitor chronic medical issues and continue current plan of  care.  Letter stating 160 lb is a good goal weight.  Update labs and health maintenance  Take calcium and vit d supplement for bone health  Lipid stable  Htn stable    Follow up in about 6 months (around 12/5/2024), or if symptoms worsen or fail to improve.

## 2024-06-17 ENCOUNTER — HOSPITAL ENCOUNTER (OUTPATIENT)
Dept: RADIOLOGY | Facility: HOSPITAL | Age: 84
Discharge: HOME | End: 2024-06-17
Attending: FAMILY MEDICINE
Payer: MEDICARE

## 2024-06-17 DIAGNOSIS — M81.0 OSTEOPOROSIS, UNSPECIFIED OSTEOPOROSIS TYPE, UNSPECIFIED PATHOLOGICAL FRACTURE PRESENCE: ICD-10-CM

## 2024-06-17 DIAGNOSIS — Z13.820 SCREENING FOR OSTEOPOROSIS: ICD-10-CM

## 2024-06-17 PROCEDURE — 77080 DXA BONE DENSITY AXIAL: CPT | Mod: 26,,, | Performed by: RADIOLOGY

## 2024-06-17 PROCEDURE — 77080 DXA BONE DENSITY AXIAL: CPT | Mod: TC,PO

## 2024-06-21 ENCOUNTER — OFFICE VISIT (OUTPATIENT)
Dept: OPTOMETRY | Facility: CLINIC | Age: 84
End: 2024-06-21
Payer: MEDICARE

## 2024-06-21 ENCOUNTER — PATIENT MESSAGE (OUTPATIENT)
Dept: FAMILY MEDICINE | Facility: CLINIC | Age: 84
End: 2024-06-21
Payer: MEDICARE

## 2024-06-21 DIAGNOSIS — Z96.1 PSEUDOPHAKIA OF BOTH EYES: ICD-10-CM

## 2024-06-21 DIAGNOSIS — H04.123 BILATERAL DRY EYES: ICD-10-CM

## 2024-06-21 DIAGNOSIS — H35.3131 EARLY DRY STAGE NONEXUDATIVE AGE-RELATED MACULAR DEGENERATION OF BOTH EYES: Primary | ICD-10-CM

## 2024-06-21 DIAGNOSIS — H52.7 REFRACTIVE ERROR: ICD-10-CM

## 2024-06-21 PROCEDURE — 99999 PR PBB SHADOW E&M-EST. PATIENT-LVL III: CPT | Mod: PBBFAC,,, | Performed by: OPTOMETRIST

## 2024-06-21 NOTE — PROGRESS NOTES
HPI    Pt here for 6 month follow up DLS- 12/04/23    Pt states her vision is stable with specs, has been using older specs   couldn't get use to the line bifocal.  Denies any wavy lines in vision.   BP has been stable on meds.   Denies F/F.   Using OTC Refresh BID OU with  relief, taking Areds daily.    Occasional pain when pressing or touch DAILY.   Last edited by Jennifer Jones on 6/21/2024  9:55 AM.            Assessment /Plan     For exam results, see Encounter Report.    Early dry stage nonexudative age-related macular degeneration of both eyes  -     OCT- Retina    Pseudophakia of both eyes    Refractive error    Bilateral dry eyes      Acuity stable, oct macula stable, Monitor condition. Patient to report any changes. RTC 1 year recheck.  2. Monitor condition. Patient to report any changes. RTC 1 year recheck.  3. New Spectacle Rx given, discussed different options for glasses. RTC 1 year routine eye exam.     4. Recommend artificial tears. 1 drop 2x per day. Chronicity of disease and treatment discussed.    Visit today included increased complexity associated with the care of the episodic problem macular degenerationaddressed and managing the longitudinal care of the patient due to the serious and/or complex managed problem(s) .

## 2024-07-13 DIAGNOSIS — L21.9 SEBORRHEIC DERMATITIS: ICD-10-CM

## 2024-07-15 RX ORDER — KETOCONAZOLE 20 MG/ML
SHAMPOO, SUSPENSION TOPICAL
Qty: 120 ML | Refills: 6 | OUTPATIENT
Start: 2024-07-15

## 2024-07-15 RX ORDER — FLUOCINONIDE TOPICAL SOLUTION USP, 0.05% 0.5 MG/ML
SOLUTION TOPICAL 2 TIMES DAILY
Qty: 60 ML | Refills: 0 | OUTPATIENT
Start: 2024-07-15

## 2024-07-16 DIAGNOSIS — L21.9 SEBORRHEIC DERMATITIS: ICD-10-CM

## 2024-07-16 RX ORDER — FLUOCINONIDE TOPICAL SOLUTION USP, 0.05% 0.5 MG/ML
SOLUTION TOPICAL 2 TIMES DAILY
Qty: 60 ML | Refills: 0 | Status: SHIPPED | OUTPATIENT
Start: 2024-07-16

## 2024-07-16 RX ORDER — KETOCONAZOLE 20 MG/ML
SHAMPOO, SUSPENSION TOPICAL
Qty: 120 ML | Refills: 6 | Status: SHIPPED | OUTPATIENT
Start: 2024-07-18

## 2024-07-22 ENCOUNTER — TELEPHONE (OUTPATIENT)
Dept: FAMILY MEDICINE | Facility: CLINIC | Age: 84
End: 2024-07-22
Payer: MEDICARE

## 2024-07-22 DIAGNOSIS — I25.10 CORONARY ARTERY DISEASE INVOLVING NATIVE CORONARY ARTERY OF NATIVE HEART WITHOUT ANGINA PECTORIS: ICD-10-CM

## 2024-07-22 DIAGNOSIS — I10 PRIMARY HYPERTENSION: Primary | ICD-10-CM

## 2024-07-22 RX ORDER — CLOPIDOGREL BISULFATE 75 MG/1
75 TABLET ORAL DAILY
Qty: 90 TABLET | Refills: 3 | Status: CANCELLED | OUTPATIENT
Start: 2024-07-22

## 2024-07-22 RX ORDER — CLOPIDOGREL BISULFATE 75 MG/1
75 TABLET ORAL DAILY
Qty: 90 TABLET | Refills: 3 | Status: SHIPPED | OUTPATIENT
Start: 2024-07-22

## 2024-07-22 NOTE — TELEPHONE ENCOUNTER
Ms. Guo is needing labs orders put back in.  She came for labs, they were running behind so she left.  Now the orders are not valid since she was signed in.  Please call when labs are in and schedule them in [place].

## 2024-07-22 NOTE — TELEPHONE ENCOUNTER
----- Message from Jyoti Castro sent at 7/22/2024  9:35 AM CDT -----  Caller is requesting to schedule their Lab appointment.    Name of Caller:  Pt    Where would they like the lab performed?    [facility]    Would the patient rather a call back or a response via [facility]?   Call back    Best Call Back Number:   [phone]    Additional Information:  Pt went to [facility] and they were behind and needing orders put back in since she arrived and trying to go 7/23.  Please call back to advise. Thanks!

## 2024-07-23 NOTE — TELEPHONE ENCOUNTER
Since she was checked in,  the orders are  not valid.  I tried just unlinking them. Please put orders back in.

## 2024-07-24 ENCOUNTER — LAB VISIT (OUTPATIENT)
Dept: LAB | Facility: HOSPITAL | Age: 84
End: 2024-07-24
Attending: FAMILY MEDICINE
Payer: MEDICARE

## 2024-07-24 DIAGNOSIS — I10 PRIMARY HYPERTENSION: ICD-10-CM

## 2024-07-24 LAB
ALBUMIN SERPL BCP-MCNC: 3.7 G/DL (ref 3.5–5.2)
ALP SERPL-CCNC: 68 U/L (ref 55–135)
ALT SERPL W/O P-5'-P-CCNC: 13 U/L (ref 10–44)
ANION GAP SERPL CALC-SCNC: 5 MMOL/L (ref 8–16)
AST SERPL-CCNC: 18 U/L (ref 10–40)
BILIRUB SERPL-MCNC: 0.6 MG/DL (ref 0.1–1)
BUN SERPL-MCNC: 27 MG/DL (ref 8–23)
CALCIUM SERPL-MCNC: 9.4 MG/DL (ref 8.7–10.5)
CHLORIDE SERPL-SCNC: 107 MMOL/L (ref 95–110)
CHOLEST SERPL-MCNC: 139 MG/DL (ref 120–199)
CHOLEST/HDLC SERPL: 2.6 {RATIO} (ref 2–5)
CO2 SERPL-SCNC: 28 MMOL/L (ref 23–29)
CREAT SERPL-MCNC: 1.2 MG/DL (ref 0.5–1.4)
ERYTHROCYTE [DISTWIDTH] IN BLOOD BY AUTOMATED COUNT: 12.8 % (ref 11.5–14.5)
EST. GFR  (NO RACE VARIABLE): 44.4 ML/MIN/1.73 M^2
GLUCOSE SERPL-MCNC: 88 MG/DL (ref 70–110)
HCT VFR BLD AUTO: 42.1 % (ref 37–48.5)
HDLC SERPL-MCNC: 53 MG/DL (ref 40–75)
HDLC SERPL: 38.1 % (ref 20–50)
HGB BLD-MCNC: 13.6 G/DL (ref 12–16)
LDLC SERPL CALC-MCNC: 73.2 MG/DL (ref 63–159)
MCH RBC QN AUTO: 32.8 PG (ref 27–31)
MCHC RBC AUTO-ENTMCNC: 32.3 G/DL (ref 32–36)
MCV RBC AUTO: 101 FL (ref 82–98)
NONHDLC SERPL-MCNC: 86 MG/DL
PLATELET # BLD AUTO: 158 K/UL (ref 150–450)
PMV BLD AUTO: 11.1 FL (ref 9.2–12.9)
POTASSIUM SERPL-SCNC: 4.8 MMOL/L (ref 3.5–5.1)
PROT SERPL-MCNC: 6.8 G/DL (ref 6–8.4)
RBC # BLD AUTO: 4.15 M/UL (ref 4–5.4)
SODIUM SERPL-SCNC: 140 MMOL/L (ref 136–145)
TRIGL SERPL-MCNC: 64 MG/DL (ref 30–150)
TSH SERPL DL<=0.005 MIU/L-ACNC: 2.16 UIU/ML (ref 0.4–4)
WBC # BLD AUTO: 4.92 K/UL (ref 3.9–12.7)

## 2024-07-24 PROCEDURE — 80053 COMPREHEN METABOLIC PANEL: CPT | Performed by: FAMILY MEDICINE

## 2024-07-24 PROCEDURE — 80061 LIPID PANEL: CPT | Performed by: FAMILY MEDICINE

## 2024-07-24 PROCEDURE — 84443 ASSAY THYROID STIM HORMONE: CPT | Performed by: FAMILY MEDICINE

## 2024-07-24 PROCEDURE — 85027 COMPLETE CBC AUTOMATED: CPT | Performed by: FAMILY MEDICINE

## 2024-07-24 PROCEDURE — 36415 COLL VENOUS BLD VENIPUNCTURE: CPT | Mod: PN | Performed by: FAMILY MEDICINE

## 2024-07-29 ENCOUNTER — OFFICE VISIT (OUTPATIENT)
Dept: CARDIOLOGY | Facility: CLINIC | Age: 84
End: 2024-07-29
Payer: MEDICARE

## 2024-07-29 ENCOUNTER — HOSPITAL ENCOUNTER (OUTPATIENT)
Dept: CARDIOLOGY | Facility: HOSPITAL | Age: 84
Discharge: HOME | End: 2024-07-29
Attending: INTERNAL MEDICINE
Payer: MEDICARE

## 2024-07-29 VITALS
DIASTOLIC BLOOD PRESSURE: 58 MMHG | WEIGHT: 154.75 LBS | SYSTOLIC BLOOD PRESSURE: 102 MMHG | BODY MASS INDEX: 25.78 KG/M2 | HEIGHT: 65 IN

## 2024-07-29 DIAGNOSIS — Z95.810 ICD (IMPLANTABLE CARDIOVERTER-DEFIBRILLATOR) IN PLACE: ICD-10-CM

## 2024-07-29 DIAGNOSIS — I48.0 PAF (PAROXYSMAL ATRIAL FIBRILLATION): ICD-10-CM

## 2024-07-29 DIAGNOSIS — I48.0 PAROXYSMAL ATRIAL FIBRILLATION: ICD-10-CM

## 2024-07-29 DIAGNOSIS — I25.10 CORONARY ARTERY DISEASE INVOLVING NATIVE CORONARY ARTERY OF NATIVE HEART WITHOUT ANGINA PECTORIS: Primary | ICD-10-CM

## 2024-07-29 DIAGNOSIS — Z95.1 S/P CABG (CORONARY ARTERY BYPASS GRAFT): ICD-10-CM

## 2024-07-29 DIAGNOSIS — I42.0 ISCHEMIC DILATED CARDIOMYOPATHY: ICD-10-CM

## 2024-07-29 DIAGNOSIS — I50.42 CHRONIC COMBINED SYSTOLIC AND DIASTOLIC HEART FAILURE: ICD-10-CM

## 2024-07-29 DIAGNOSIS — I25.5 ISCHEMIC DILATED CARDIOMYOPATHY: ICD-10-CM

## 2024-07-29 DIAGNOSIS — Z95.810 CARDIAC DEFIBRILLATOR IN SITU: ICD-10-CM

## 2024-07-29 PROCEDURE — 1126F AMNT PAIN NOTED NONE PRSNT: CPT | Mod: CPTII,S$GLB,, | Performed by: INTERNAL MEDICINE

## 2024-07-29 PROCEDURE — 99214 OFFICE O/P EST MOD 30 MIN: CPT | Mod: S$GLB,,, | Performed by: INTERNAL MEDICINE

## 2024-07-29 PROCEDURE — 99999 PR PBB SHADOW E&M-EST. PATIENT-LVL IV: CPT | Mod: PBBFAC,,, | Performed by: INTERNAL MEDICINE

## 2024-07-29 PROCEDURE — 1101F PT FALLS ASSESS-DOCD LE1/YR: CPT | Mod: CPTII,S$GLB,, | Performed by: INTERNAL MEDICINE

## 2024-07-29 PROCEDURE — 3288F FALL RISK ASSESSMENT DOCD: CPT | Mod: CPTII,S$GLB,, | Performed by: INTERNAL MEDICINE

## 2024-07-29 PROCEDURE — 1159F MED LIST DOCD IN RCRD: CPT | Mod: CPTII,S$GLB,, | Performed by: INTERNAL MEDICINE

## 2024-07-29 PROCEDURE — 3074F SYST BP LT 130 MM HG: CPT | Mod: CPTII,S$GLB,, | Performed by: INTERNAL MEDICINE

## 2024-07-29 PROCEDURE — 1160F RVW MEDS BY RX/DR IN RCRD: CPT | Mod: CPTII,S$GLB,, | Performed by: INTERNAL MEDICINE

## 2024-07-29 PROCEDURE — 3078F DIAST BP <80 MM HG: CPT | Mod: CPTII,S$GLB,, | Performed by: INTERNAL MEDICINE

## 2024-07-29 PROCEDURE — 93283 PRGRMG EVAL IMPLANTABLE DFB: CPT | Mod: PO

## 2024-07-29 RX ORDER — CHOLECALCIFEROL (VITAMIN D3) 25 MCG
1000 TABLET ORAL DAILY
COMMUNITY

## 2024-07-29 RX ORDER — FERROUS SULFATE, DRIED 160(50) MG
1 TABLET, EXTENDED RELEASE ORAL 2 TIMES DAILY WITH MEALS
COMMUNITY

## 2024-07-29 NOTE — PROGRESS NOTES
Subjective:    Patient ID:  Sari Biswas is a 84 y.o. female who presents for follow-up of Follow-up      HPI  She comes with no complaints, no chest pain, no shortness of breath  FC II  No cardiac complaints at this time.    Only question that she has is the significant amount of bruises that she gets with the Xarelto and Plavix.    Review of Systems   Constitutional: Negative for decreased appetite, malaise/fatigue, weight gain and weight loss.   Cardiovascular:  Negative for chest pain, dyspnea on exertion, leg swelling, palpitations and syncope.   Respiratory:  Negative for cough and shortness of breath.    Gastrointestinal: Negative.    Neurological:  Negative for weakness.   All other systems reviewed and are negative.     Objective:      Physical Exam  Vitals and nursing note reviewed.   Constitutional:       Appearance: Normal appearance. She is well-developed.   HENT:      Head: Normocephalic.   Eyes:      Pupils: Pupils are equal, round, and reactive to light.   Neck:      Thyroid: No thyromegaly.      Vascular: No carotid bruit or JVD.   Cardiovascular:      Rate and Rhythm: Normal rate and regular rhythm.      Chest Wall: PMI is not displaced.      Pulses: Normal pulses and intact distal pulses.      Heart sounds: Normal heart sounds. No murmur heard.     No gallop.   Pulmonary:      Effort: Pulmonary effort is normal.      Breath sounds: Normal breath sounds.   Abdominal:      Palpations: Abdomen is soft. There is no mass.      Tenderness: There is no abdominal tenderness.   Musculoskeletal:         General: Normal range of motion.      Cervical back: Normal range of motion and neck supple.   Skin:     General: Skin is warm.   Neurological:      Mental Status: She is alert and oriented to person, place, and time.      Sensory: No sensory deficit.      Deep Tendon Reflexes: Reflexes are normal and symmetric.         Most Recent EKG Results  Results for orders placed or performed in visit on 07/15/22    IN OFFICE EKG 12-LEAD (to [place])    Collection Time: 07/15/22  3:12 PM    Narrative    Test Reason : z00.00    Vent. Rate : 074 BPM     Atrial Rate : 074 BPM     P-R Int : 148 ms          QRS Dur : 080 ms      QT Int : 392 ms       P-R-T Axes : 000 -31 098 degrees     QTc Int : 435 ms    Atrial-paced rhythm  Left axis deviation  Nonspecific ST and/or T wave abnormalities  Abnormal ECG  When compared with ECG of 25-JUN-2022 10:48,  No significant change was found  Confirmed by Agustin Gagnon MD ([id number]) on 7/16/2022 2:06:44 PM    Referred By: CONSTANTINO HUGO           Confirmed By:Agustin Gagnon MD       Most Recent Echocardiogram Results  Results for orders placed in visit on 07/11/23    Echo    Interpretation Summary  · The left ventricle is normal in size with mild concentric hypertrophy and moderately decreased systolic function.  · The estimated ejection fraction is 35%.  · Grade I left ventricular diastolic dysfunction.  · Normal right ventricular size with normal right ventricular systolic function.  · Mild mitral regurgitation.  · Mild to moderate tricuspid regurgitation.  · Normal central venous pressure (3 mmHg).  · The estimated PA systolic pressure is 33 mmHg.      Most Recent Nuclear Stress Test Results  No results found for this or any previous visit.      Most Recent Cardiac PET Stress Test Results  No results found for this or any previous visit.      Most Recent Cardiovascular Angiogram results  Results for orders placed during the hospital encounter of 07/11/18    Cardiac catheterization    Narrative  · Female patient 78 years old who has multivessel coronary disease including distal left main with heavy calcification and the LAD diagonal bifurcation with heavy calcification. Symptomatic angina.  · Given option for surgical revascularization after diagnostic procedure. Lengthy discussion with patient and  in the office explaining her syntax score. Her syntax score was around 27. This shows  more favorable outcome with surgical revascularization. Patient was not interested in surgical revascularization or getting a surgical opinion. She and sanjuanita declined surgical consult.  · Explained to her the complexity of coronary intervention including the possible need for mechanical circulatory support. Also explained the risk of procedure including rotational atherectomy which would be necessary for complete revascularization.  · Again all of this is explained to the patient and her  in an office meeting that occurred about a week or so ago. We gave the patient the option of having this done early in the morning at another facility. However the patient's  was not able to drive to that facility to the procedure was scheduled at [facility] in the afternoon on today.  · Patient the catheterization lab in fasting state. Anesthesia sedated her for the procedure given the complexity but was necessary. We accessed the radial artery on the right and the femoral artery on the right. Ultrasound guidance was used for femoral artery access on the right. A 6 Urdu sheath was placed in anticipation of possible impeller need  · He is in a 7 Urdu system in the right radial artery after 80 units per kilogram heparin was given 2000 which through the radial cocktail a CLS 3.5 guiding catheter was used to engage left main  · Angiography demonstrates distal left main disease which is confirmed by intravascular ultrasound and angiographic assessment done about 2 weeks ago. Left main into LAD not involving the ostial circumflex. Again that was confirmed by intravascular ultrasound  · There was an LAD diagonal bifurcation lesion have a calcified. More calcification in the LAD than in the diagonal  · Using an over-the-wire system with advanced a floppy Rotablator wire into the distal LAD. We used a soft wire and then an over-the-wire balloon to exchanged out for the floppy rotational atherectomy wire. The  balloon was withdrawn. We did rotational atherectomy of the mid LAD and the distal left main into the proximal LAD. 2 separate runs for about 30 seconds at a 6 cyclic speed of 160,000revoutions.  · No hemodynamic embarrassment at this point. Blood pressure remained stable. ACT came back well above 250 seconds.  · We did give one more thousand units of heparin during the procedure when the ACT came back at around 210-220. I did we would be doing the intervention for a little while longer 70 to the ACT a little higher.  · Once rotational atherectomy was done we advanced 2 separate wires into the LAD and diagonal respectively. A  50 the diagonal and a whisper extra-support the LAD.  · Our intention was to do a crush technique. We we advanced a 3.5 x 38 stent into the LAD but it was not able to go far enough. Furthermore once LAD stent was in position we were not able to get her diagonal stent in.  · Therefore we withdrew the LAD stents leaving our wire in the LAD and diagonal. We did balloon angioplasty of the ostial diagonal with a 3.5 and 4.0 noncompliant balloons with good result.  · We then tried advancing a 0.75 Synergy stent to the diagonal but would not go past the proximal or ostial segment. Therefore we withdrew the stent from the diagonal and advance a stent to the LAD. The intention at this point was because the ostium of the diagonal looked acceptable we would stented the LAD and recross into the diagonal.  · Once he stented the LAD with a 3.5 x 30 8I noticed that her ST segments were elevated. At this point I expected the diagonal would be having slow flow.  · Angiographically there was no flow past the mid segment of the diagonal. I rapidly rewired the vessel.  · At this point when angiographic assessment was done there was a perforation in the LAD. I knew that she was going to have pericardial fluid and tamponade  · At this point we rapidly did a pericardial access with fluoroscopic guidance. I was  able to negotiate a catheter into the pericardial space and withdrew about 700 mL of blood.  · We autotransfuser split into the patient.  · Anesthesia was present and I advised them to intubate her and manage her hemodynamics which they did. Pressors were started including epinephrine and levo fed as well as Luís-Synephrine.  · I then placed iIMPELLA catheter via right femoral artery access. This was an IMPELLA CP  · We then continued aspirating blood off the pericardial space. At this point we did multiple balloon inflations for 6-10 minutes in the proximal LAD to try to help to close off the perforation. This however was not successful. Therefore placed a 2.5 covered stent in the distal LAD. She had persistent bleeding but ultimately the bleeding did stop.  · I needed to guide liner to deliver the covered stent.  · With the covered stent in place she had GABRIELE 2 flow into the distal LAD at the end of the procedure with what appeared to be thrombus in the LAD. However at this point we had held off on giving any further heparin and I did start her initially on IIb IIIa inhibitor once the slow flow in the diagonal was noted but it was stopped immediately once the perforation was noted.  · Surgery had been called and were available and in the room at the time the decision was made to take her to the operating room.\  · She was hemodynamically stable at this time with a good rhythm. Blood pressure was stable. She was on pressors but systolic pressure was well above 100.  · We had to do chest compressions for about 1 minutes. She also had to be defibrillated once. However her airway was accessed immediately and the pericardial effusion was drained immediately.  · She went to the OR in critical condition however.  · I explained to the patient's family the occurrences. Explained this is unfortunately a given risk of this complex type of intervention but that everything was done in an expedient fashion to try to restore  circulation and try to manage the perforation.  · We are hopeful that she will make a meaningful recovery.  · It is possible that her LV function will be affected by these events.    I certify that I was present for catheter insertion, catheter manipulation, angiography, and angiographic interpretation of this patient.      Procedure Log documented by Documenter: Liz Ward RN and verified by Kumar Colon.    Date: 7/11/2018  Time: 6:27 PM      Other Most Recent Cardiology Results  Results for orders placed in visit on 05/04/24    Cardiac device check - Remote      Labs reviewed    Assessment:       1. Coronary artery disease involving native coronary artery of native heart without angina pectoris    2. S/P CABG (coronary artery bypass graft)    3. Paroxysmal atrial fibrillation    4. Ischemic dilated cardiomyopathy    5. ICD (implantable cardioverter-defibrillator) in place    6. Chronic combined systolic and diastolic heart failure         Plan:   Not interested in [facility] at this time.    She will call again if she wants to pursue this.  Continue:  Antiarrhythmic, Diuretic, DOAC, and MRA  Regular exercise program  Weight loss  Low cholesterol diet    Follow-up in 9 months with Margo Miller or [facility]ison

## 2024-08-04 ENCOUNTER — HOSPITAL ENCOUNTER (OUTPATIENT)
Dept: CARDIOLOGY | Facility: HOSPITAL | Age: 84
Discharge: HOME | End: 2024-08-04
Attending: INTERNAL MEDICINE

## 2024-08-04 ENCOUNTER — CLINICAL SUPPORT (OUTPATIENT)
Dept: CARDIOLOGY | Facility: HOSPITAL | Age: 84
End: 2024-08-04
Payer: MEDICARE

## 2024-08-04 DIAGNOSIS — Z95.810 PRESENCE OF AUTOMATIC (IMPLANTABLE) CARDIAC DEFIBRILLATOR: ICD-10-CM

## 2024-08-04 PROCEDURE — 93295 DEV INTERROG REMOTE 1/2/MLT: CPT | Mod: ,,, | Performed by: INTERNAL MEDICINE

## 2024-08-04 PROCEDURE — 93296 REM INTERROG EVL PM/IDS: CPT | Mod: PO | Performed by: INTERNAL MEDICINE

## 2024-08-19 LAB
OHS CV AF BURDEN PERCENT: < 1
OHS CV DC REMOTE DEVICE TYPE: NORMAL
OHS CV RV PACING PERCENT: 0 %

## 2024-08-31 DIAGNOSIS — L21.9 SEBORRHEIC DERMATITIS: ICD-10-CM

## 2024-09-03 RX ORDER — FLUOCINONIDE TOPICAL SOLUTION USP, 0.05% 0.5 MG/ML
SOLUTION TOPICAL 2 TIMES DAILY
Qty: 60 ML | Refills: 0 | OUTPATIENT
Start: 2024-09-03

## 2024-09-05 ENCOUNTER — OFFICE VISIT (OUTPATIENT)
Dept: OBSTETRICS AND GYNECOLOGY | Facility: CLINIC | Age: 84
End: 2024-09-05
Payer: MEDICARE

## 2024-09-05 VITALS — HEIGHT: 65 IN | WEIGHT: 153 LBS | BODY MASS INDEX: 25.49 KG/M2

## 2024-09-05 DIAGNOSIS — Z12.31 SCREENING MAMMOGRAM FOR BREAST CANCER: ICD-10-CM

## 2024-09-05 DIAGNOSIS — Z01.419 ROUTINE GYNECOLOGICAL EXAMINATION: Primary | ICD-10-CM

## 2024-09-05 DIAGNOSIS — Z90.710 S/P HYSTERECTOMY: ICD-10-CM

## 2024-09-05 PROCEDURE — 99999 PR PBB SHADOW E&M-EST. PATIENT-LVL III: CPT | Mod: PBBFAC,,, | Performed by: OBSTETRICS & GYNECOLOGY

## 2024-09-05 NOTE — PROGRESS NOTES
Chief Complaint   Patient presents with    Well Woman     History of Present Illness: Sari Biswas is a 84 y.o. female that presents today 9/5/2024 for well gyn visit.    Past Medical History:   Diagnosis Date    Anticoagulant long-term use     Arthritis     Cancer     CHF (congestive heart failure)     COPD (chronic obstructive pulmonary disease)     Coronary artery disease     Coronary artery disease involving native coronary artery of native heart 07/21/2018    Defibrillator discharge     Depression     DVT (deep venous thrombosis) 2012    left calf    Encounter for blood transfusion     GERD (gastroesophageal reflux disease)     Hematoma     several    History of recurrent UTIs     Hypertension     ICD (implantable cardioverter-defibrillator) in place     Non-recurrent unilateral inguinal hernia without obstruction or gangrene 06/2021    Pacemaker     Paroxysmal atrial fibrillation     Skin cancer     SVT (supraventricular tachycardia)        Past Surgical History:   Procedure Laterality Date    ABLATION N/A 10/26/2018    Procedure: Ablation;  Surgeon: Bonilla Fitzgerald MD;  Location: [facility] CATH LAB;  Service: Cardiology;  Laterality: N/A;  AFL, LOUANN, RFA, PAVAN, MAC, SK ,3prep *Anticipate left femoral approach*    APPENDECTOMY      ATHERECTOMY N/A 7/11/2018    Procedure: Atherectomy;  Surgeon: Kumar Colon MD;  Location: [facility] CATH;  Service: Cardiology;  Laterality: N/A;    BREAST BIOPSY Left     over 10 yrs. ago    CARDIAC CATHETERIZATION      CARDIAC SURGERY      angiogram    CATARACT EXTRACTION W/  INTRAOCULAR LENS IMPLANT      CHOLECYSTECTOMY      CORONARY ANGIOGRAPHY N/A 6/19/2018    Procedure: ANGIOGRAM-CORONARY;  Surgeon: Kumar Colon MD;  Location: [facility] CATH;  Service: Cardiology;  Laterality: N/A;    CORONARY ARTERY BYPASS GRAFT      CORONARY ARTERY BYPASS GRAFT (CABG) N/A 7/11/2018    Procedure: CREATION, BYPASS, ARTERIAL, AORTA TO CORONARY, USING GRAFT  [facility];  Surgeon: Aurora Hunter MD;  Location:  [facility] OR;  Service: Cardiovascular;  Laterality: N/A;  emergency    CORONARY STENT PLACEMENT N/A 7/11/2018    Procedure: Stent DIXIE coronary;  Surgeon: Kumar Colon MD;  Location: [facility] CATH;  Service: Cardiology;  Laterality: N/A;    EYE SURGERY      cataract    HYSTERECTOMY      INSERTION OF INTRAVASCULAR MICROAXIAL BLOOD PUMP N/A 7/11/2018    Procedure: INSERTION, IMPELLA;  Surgeon: Kumar Colon MD;  Location: [facility] CATH;  Service: Cardiology;  Laterality: N/A;    INSERTION OF TEMPORARY PACEMAKER N/A 7/11/2018    Procedure: INSERTION, PACEMAKER, TEMPORARY;  Surgeon: Kumar Colon MD;  Location: [facility] CATH;  Service: Cardiology;  Laterality: N/A;    KNEE ARTHROSCOPY W/ DEBRIDEMENT  2012    LEFT HEART CATHETERIZATION Left 6/19/2018    Procedure: Left heart cath;  Surgeon: Kumar Colon MD;  Location: [facility] CATH;  Service: Cardiology;  Laterality: Left;    LEFT HEART CATHETERIZATION Left 7/11/2018    Procedure: Left heart cath;  Surgeon: Kumar Colon MD;  [facility];  Service: Cardiology;  Laterality: Left;    OOPHORECTOMY      PERICARDIOCENTESIS N/A 7/11/2018    Procedure: Pericardiocentesis;  Surgeon: Kumar Colon MD;  [facility];  Service: Cardiology;  Laterality: N/A;    ROBOT-ASSISTED LAPAROSCOPIC REPAIR OF INGUINAL HERNIA USING DA YESSICA XI Right 6/28/2021    Procedure: XI ROBOTIC REPAIR, HERNIA, INGUINAL;  Surgeon: Keshav Alonzo MD;  Location: [facility] OR;  Service: General;  Laterality: Right;    TRANSESOPHAGEAL ECHOCARDIOGRAPHY N/A 10/26/2018    Procedure: ECHOCARDIOGRAM, TRANSESOPHAGEAL;  Surgeon: Bonilla Fitzgerald MD;  Location: [facility] CATH LAB;  Service: Cardiology;  Laterality: N/A;    TREATMENT OF CARDIAC ARRHYTHMIA N/A 10/8/2018    Procedure: Cardioversion/Defibrillation;  Surgeon: Rodo Singleton MD;  Location: [facility];  Service: Cardiology;  Laterality: N/A;       Outpatient Medications Prior to Visit   Medication Sig Dispense Refill    BIOTIN ORAL Take 1  capsule by mouth once daily.       calcium-vitamin D3 (OS-MILO 500 + D3) 500 mg-5 mcg (200 unit) per tablet Take 1 tablet by mouth 2 (two) times daily with meals.      carboxymethylcellulose (REFRESH PLUS) 0.5 % Dpet 1 drop 3 (three) times daily as needed.      clobetasoL (CLOBEX) 0.05 % shampoo Wash scalp 2 to 3 times a week as needed for rash and itching as scalp. 118 mL 3    clopidogreL (PLAVIX) 75 mg tablet Take 1 tablet (75 mg total) by mouth once daily. 90 tablet 3    cranberry 400 mg Cap Take 1 capsule by mouth once daily.       fluocinonide (LIDEX) 0.05 % external solution Apply topically 2 (two) times daily. 60 mL 0    furosemide (LASIX) 20 MG tablet Take 1 tablet (20 mg total) by mouth once daily. 90 tablet 3    ketoconazole (NIZORAL) 2 % shampoo Apply topically twice a week. 120 mL 6    metoprolol succinate (TOPROL-XL) 25 MG 24 hr tablet Take 1 tablet (25 mg total) by mouth once daily. 30 tablet 11    rivaroxaban (XARELTO) 20 mg Tab Take 1 tablet (20 mg total) by mouth daily with dinner or evening meal. 31 tablet 11    rosuvastatin (CRESTOR) 5 MG tablet Take 1 tablet (5 mg total) by mouth every evening. 90 tablet 3    sacubitriL-valsartan (ENTRESTO) 24-26 mg per tablet Take 1 tablet by mouth 2 (two) times daily. 180 tablet 3    sotaloL (BETAPACE) 80 MG tablet Take 1 tablet (80 mg total) by mouth 2 (two) times daily. 180 tablet 3    spironolactone (ALDACTONE) 25 MG tablet Take 0.5 tablets (12.5 mg total) by mouth once daily. 46 tablet 3    vit C,V-Sk-fxshr-lutein-zeaxan (PRESERVISION AREDS 2) 250-90-40-1 mg Cap Take by mouth 2 (two) times daily.      vitamin D (VITAMIN D3) 1000 units Tab Take 1,000 Units by mouth once daily.      clobetasoL (CLOBEX) 0.05 % shampoo Wash scalp once weekly. Avoid use of medication on face, body folds, groin/genitalia. 118 mL 5    hydrocortisone 2.5 % cream APPLY EXTERNALLY TO THE AFFECTED AREA TWICE DAILY FOR 1 WEEK THEN AS NEEDED FOR FLARE UPS (Patient not taking: Reported on  2024)      ketoconazole (NIZORAL) 2 % cream APPLY EXTERNALLY TO RASH ONCE TO TWICE DAILY AS DIRECTED (Patient not taking: Reported on 2024)      roflumilast 0.3 % Foam Apply to affected area on  scalp once daily as needed for rash and itching. (Patient not taking: Reported on 2024) 60 g 5     No facility-administered medications prior to visit.       Review of patient's allergies indicates:  No Known Allergies    Family History   Problem Relation Name Age of Onset    Cancer Father          lung    Cancer Sister          breast    Breast cancer Sister  78    Cancer Brother          brain    Macular degeneration Brother      Breast cancer Paternal Aunt      Glaucoma Neg Hx      Retinal detachment Neg Hx      Melanoma Neg Hx      Psoriasis Neg Hx      Lupus Neg Hx      Eczema Neg Hx         Social History     Socioeconomic History    Marital status:    Tobacco Use    Smoking status: Former     Current packs/day: 0.00     Average packs/day: 1 pack/day for 40.0 years (40.0 ttl pk-yrs)     Types: Cigarettes     Start date:      Quit date:      Years since quittin.6    Smokeless tobacco: Never    Tobacco comments:     quit smoking in .   Substance and Sexual Activity    Alcohol use: Yes     Comment: seldom    Drug use: No     Social Determinants of Health     Financial Resource Strain: Low Risk  (2024)    Overall Financial Resource Strain (CARDIA)     Difficulty of Paying Living Expenses: Not very hard   Food Insecurity: No Food Insecurity (2024)    Hunger Vital Sign     Worried About Running Out of Food in the Last Year: Never true     Ran Out of Food in the Last Year: Never true   Transportation Needs: No Transportation Needs (2024)    PRAPARE - Transportation     Lack of Transportation (Medical): No     Lack of Transportation (Non-Medical): No   Physical Activity: Insufficiently Active (2024)    Exercise Vital Sign     Days of Exercise per Week: 5 days     Minutes  "of Exercise per Session: 20 min   Stress: No Stress Concern Present (11/3/2023)    [employer] [place] [employer] - Occupational Stress Questionnaire     Feeling of Stress : Only a little   Housing Stability: Low Risk  (2024)    Housing Stability Vital Sign     Unable to Pay for Housing in the Last Year: No     Number of Places Lived in the Last Year: 1     Unstable Housing in the Last Year: No       OB History    Para Term  AB Living   5 2 2   1     SAB IAB Ectopic Multiple Live Births   1              # Outcome Date GA Lbr Gilbert/2nd Weight Sex Type Anes PTL Lv   5 Term            4 Term            3       Vag-Spont      2       Vag-Spont      1 SAB                Review of Symptoms:  GENERAL: Denies weight gain or weight loss. Feeling well overall.   SKIN: Denies rash or lesions.   HEAD: Denies head injury or headache.   NODES: Denies enlarged lymph nodes.   CHEST: Denies chest pain or shortness of breath.   CARDIOVASCULAR: Denies palpitations or left sided chest pain.   ABDOMEN: No abdominal pain, constipation, diarrhea, nausea, vomiting or rectal bleeding.   URINARY: No frequency, dysuria, hematuria, or burning on urination.  HEMATOLOGIC: No easy bruisability or excessive bleeding.   MUSCULOSKELETAL: Denies joint pain or swelling.     Ht 5' 5" (1.651 m)   Wt 69.4 kg (153 lb)   Physical Exam:  APPEARANCE: Well nourished, well developed, in no acute distress.  SKIN: Normal skin turgor, no lesions.  NECK: Neck symmetric without masses   RESPIRATORY: Normal respiratory effort with no retractions or use of accessory muscles  CARDIOVASCULAR: Peripheral vascular system with no swelling no varicosities and palpation of pulses normal  LYMPHATIC: No enlargements of the lymph nodes noted in the neck, axillae, or groin  ABDOMEN: Soft. No tenderness or masses. No hepatosplenomegaly. No hernias.  BREASTS: Symmetrical, no skin changes or visible lesions. No palpable masses, nipple " discharge or adenopathy bilaterally.  PELVIC: Normal external female genitalia without lesions. Normal hair distribution. Adequate perineal body, normal urethral meatus. Urethra with no masses.  Bladder nontender. Vagina moist and well rugated without lesions or discharge. No significant cystocele or rectocele.  Adnexa without masses or tenderness. Urethra and bladder normal.   EXTREMITIES: No clubbing cyanosis or edema.    ASSESSMENT/PLAN:  Routine gynecological examination    S/P hysterectomy    Screening mammogram for breast cancer  -     Mammo Digital Screening Bilat w/ Bennie; Future; Expected date: 09/05/2024          Patient was counseled today on Pap guidelines. We discussed the discontinuing the pap smear after hysterectomy except in certain high risk cases.  We discussed the need for pelvic exams.   We discussed STD screening if at high risk for an STD.  We discussed breast cancer screening with mammograms every other year after the age of 40 and annually after the age of 50.    We discussed colon cancer screening.   Osteoporosis screening with the Dexa Bone Scan discussed when indicated.   She will see her PCP for other health maintenance.       FOLLOW-UP:prn

## 2024-09-25 DIAGNOSIS — Z00.00 ENCOUNTER FOR MEDICARE ANNUAL WELLNESS EXAM: ICD-10-CM

## 2024-09-25 DIAGNOSIS — L21.9 SEBORRHEIC DERMATITIS: ICD-10-CM

## 2024-09-25 RX ORDER — FLUOCINONIDE TOPICAL SOLUTION USP, 0.05% 0.5 MG/ML
SOLUTION TOPICAL 2 TIMES DAILY
Qty: 60 ML | Refills: 0 | OUTPATIENT
Start: 2024-09-25

## 2024-09-26 ENCOUNTER — TELEPHONE (OUTPATIENT)
Dept: URGENT CARE | Facility: CLINIC | Age: 84
End: 2024-09-26

## 2024-09-26 ENCOUNTER — OFFICE VISIT (OUTPATIENT)
Dept: URGENT CARE | Facility: CLINIC | Age: 84
End: 2024-09-26
Payer: MEDICARE

## 2024-09-26 VITALS
DIASTOLIC BLOOD PRESSURE: 84 MMHG | SYSTOLIC BLOOD PRESSURE: 101 MMHG | HEIGHT: 65 IN | HEART RATE: 72 BPM | BODY MASS INDEX: 25.49 KG/M2 | TEMPERATURE: 99 F | RESPIRATION RATE: 16 BRPM | OXYGEN SATURATION: 98 % | WEIGHT: 153 LBS

## 2024-09-26 DIAGNOSIS — R93.89 ABNORMAL CHEST X-RAY: ICD-10-CM

## 2024-09-26 DIAGNOSIS — J20.9 ACUTE BRONCHITIS, UNSPECIFIED ORGANISM: Primary | ICD-10-CM

## 2024-09-26 DIAGNOSIS — R05.9 COUGH, UNSPECIFIED TYPE: ICD-10-CM

## 2024-09-26 LAB
CTP QC/QA: YES
CTP QC/QA: YES
POC MOLECULAR INFLUENZA A AGN: NEGATIVE
POC MOLECULAR INFLUENZA B AGN: NEGATIVE
SARS-COV-2 AG RESP QL IA.RAPID: NEGATIVE

## 2024-09-26 RX ORDER — DOXYCYCLINE 100 MG/1
100 CAPSULE ORAL 2 TIMES DAILY
Qty: 20 CAPSULE | Refills: 0 | Status: SHIPPED | OUTPATIENT
Start: 2024-09-26 | End: 2024-10-06

## 2024-09-26 NOTE — PROGRESS NOTES
"Subjective:      Patient ID: Sari Biswas is a 84 y.o. female.    Vitals:  height is 5' 5" (1.651 m) and weight is 69.4 kg (153 lb). Her oral temperature is 98.7 °F (37.1 °C). Her blood pressure is 101/84 and her pulse is 72. Her respiration is 16 and oxygen saturation is 98%.     Chief Complaint: Cough    Pt reports to clinic with cough, chest congestion, intermittent sob and wheezing, bodyaches, chills, fatigue onset 1 week ago. Denies CP. Reports her symptoms are improving. 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 Pt using tylenol for symptoms.     Cough  This is a new problem. The current episode started in the past 7 days. The problem has been unchanged. The problem occurs constantly. The cough is Productive of sputum. Associated symptoms include chills, nasal congestion, postnasal drip and shortness of breath. Nothing aggravates the symptoms. She has tried nothing for the symptoms. The treatment provided no relief.       Constitution: Positive for chills.   HENT:  Positive for postnasal drip.    Respiratory:  Positive for cough and shortness of breath.       Objective:     Physical Exam   Constitutional: She is oriented to person, place, and time. She appears well-developed. She is cooperative.  Non-toxic appearance. She does not appear ill. No distress.   HENT:   Head: Normocephalic and atraumatic.   Ears:   Right Ear: Hearing, tympanic membrane, external ear and ear canal normal.   Left Ear: Hearing, tympanic membrane, external ear and ear canal normal.   Nose: Nose normal. No mucosal edema, rhinorrhea or nasal deformity. No epistaxis. Right sinus exhibits no maxillary sinus tenderness and no frontal sinus tenderness. Left sinus exhibits no maxillary sinus tenderness and no frontal sinus tenderness.   Mouth/Throat: Uvula is midline, oropharynx is clear and moist and mucous membranes are normal. No trismus in the jaw. Normal dentition. " No uvula swelling. Cobblestoning present. No oropharyngeal exudate, posterior oropharyngeal edema or posterior oropharyngeal erythema.   Eyes: Conjunctivae and lids are normal. No scleral icterus.   Neck: Trachea normal and phonation normal. Neck supple. No edema present. No erythema present. No neck rigidity present.   Cardiovascular: Normal rate, regular rhythm, normal heart sounds and normal pulses.   Pulmonary/Chest: Effort normal and breath sounds normal. No respiratory distress. She has no decreased breath sounds. She has no rhonchi.   Abdominal: Normal appearance.   Musculoskeletal: Normal range of motion.         General: No deformity. Normal range of motion.   Neurological: She is alert and oriented to person, place, and time. She exhibits normal muscle tone. Coordination normal.   Skin: Skin is warm, dry, intact, not diaphoretic and not pale.   Psychiatric: Her speech is normal and behavior is normal. Judgment and thought content normal.   Nursing note and vitals reviewed.      Assessment:     1. Acute bronchitis, unspecified organism    2. Cough, unspecified type    3. Abnormal chest x-ray        Plan:     Patient informed prior to checking that we do not have x-ray available.  We will order outpatient x-ray.  Patient will be contacted with x-ray results.    Negative COVID and flu in the clinic today.    Patient contacted at 1500.  Informed of chest x-ray results.  Antibiotics were called in the cover for potential inflammatory versus developing infection.  Urgent referral is placed for patient to follow up with primary care doctor regarding abnormality on chest x-ray.  Patient was informed on the results and advised to follow up.  ER precautions red flag warnings were all discussed and reviewed all questions answered.      Acute bronchitis, unspecified organism  -     Ambulatory referral/consult to Internal Medicine  -     doxycycline (VIBRAMYCIN) 100 MG Cap; Take 1 capsule (100 mg total) by mouth 2  (two) times daily. for 10 days  Dispense: 20 capsule; Refill: 0    Cough, unspecified type  -     SARS Coronavirus 2 Antigen, POCT Manual Read  -     POCT Influenza A/B MOLECULAR  -     XR CHEST PA AND LATERAL; Future; Expected date: 09/26/2024  -     Ambulatory referral/consult to Internal Medicine    Abnormal chest x-ray  -     Ambulatory referral/consult to Internal Medicine        Patient Instructions   Antibiotics.  Follow up with Internal medicine for chest xray as discussed.  Continue Mucinex as needed.  If symptoms worsen go to the ER.    You must understand that you've received an Urgent Care treatment only and that you may be released before all your medical problems are known or treated. You, the patient, will arrange for follow up care as instructed.  Follow up with your PCP or specialty clinic as directed in the next 1-2 weeks if not improved or as needed.  You can call [phone] to schedule an appointment with the appropriate provider.  If your condition worsens we recommend that you receive another evaluation at the emergency room immediately or contact your primary medical clinics after hours call service to discuss your concerns.  Please return here or go to the Emergency Department for any concerns or worsening of condition.

## 2024-09-26 NOTE — PATIENT INSTRUCTIONS
Antibiotics.  Follow up with Internal medicine for chest xray as discussed.  Continue Mucinex as needed.  If symptoms worsen go to the ER.    You must understand that you've received an Urgent Care treatment only and that you may be released before all your medical problems are known or treated. You, the patient, will arrange for follow up care as instructed.  Follow up with your PCP or specialty clinic as directed in the next 1-2 weeks if not improved or as needed.  You can call [phone] to schedule an appointment with the appropriate provider.  If your condition worsens we recommend that you receive another evaluation at the emergency room immediately or contact your primary medical clinics after hours call service to discuss your concerns.  Please return here or go to the Emergency Department for any concerns or worsening of condition.

## 2024-09-26 NOTE — TELEPHONE ENCOUNTER
Patient was contacted and informed of chest x-ray results.  Started on doxycycline to cover for potential inflammatory versus infectious process.  Urgent referral is placed for patient to follow up with internal medicine.  The importance of follow up as discussed with the patient.  She verbalizes understanding and is in agreement with the treatment plan.

## 2024-09-27 ENCOUNTER — TELEPHONE (OUTPATIENT)
Dept: FAMILY MEDICINE | Facility: CLINIC | Age: 84
End: 2024-09-27
Payer: MEDICARE

## 2024-09-27 ENCOUNTER — TELEPHONE (OUTPATIENT)
Dept: URGENT CARE | Facility: CLINIC | Age: 84
End: 2024-09-27
Payer: MEDICARE

## 2024-09-27 NOTE — TELEPHONE ENCOUNTER
----- Message from Shantel Mcadams sent at 9/27/2024  8:10 AM CDT -----  Regarding: Needs Medical Advice  Contact: patient at [phone]  Type: Needs Medical Advice  Who Called:  patient at [phone]    Additional Information: calling to get another chest xray in the system to compare to the one just taken and to get scheduled for 10/7 with her other appointment. Please call and advise. Thank you

## 2024-09-30 ENCOUNTER — OFFICE VISIT (OUTPATIENT)
Dept: URGENT CARE | Facility: CLINIC | Age: 84
End: 2024-09-30
Payer: MEDICARE

## 2024-09-30 VITALS
RESPIRATION RATE: 16 BRPM | OXYGEN SATURATION: 97 % | SYSTOLIC BLOOD PRESSURE: 99 MMHG | BODY MASS INDEX: 25.49 KG/M2 | HEIGHT: 65 IN | WEIGHT: 153 LBS | TEMPERATURE: 99 F | HEART RATE: 60 BPM | DIASTOLIC BLOOD PRESSURE: 62 MMHG

## 2024-09-30 DIAGNOSIS — R05.9 COUGH, UNSPECIFIED TYPE: ICD-10-CM

## 2024-09-30 DIAGNOSIS — T36.4X5A ESOPHAGITIS DUE TO DOXYCYCLINE: Primary | ICD-10-CM

## 2024-09-30 DIAGNOSIS — K20.80 ESOPHAGITIS DUE TO DOXYCYCLINE: Primary | ICD-10-CM

## 2024-09-30 PROCEDURE — 99214 OFFICE O/P EST MOD 30 MIN: CPT | Mod: S$GLB,,, | Performed by: EMERGENCY MEDICINE

## 2024-09-30 PROCEDURE — 71046 X-RAY EXAM CHEST 2 VIEWS: CPT | Mod: S$GLB,,, | Performed by: RADIOLOGY

## 2024-09-30 RX ORDER — SUCRALFATE 1 G/1
1 TABLET ORAL
Qty: 30 TABLET | Refills: 0 | Status: SHIPPED | OUTPATIENT
Start: 2024-09-30

## 2024-09-30 RX ORDER — PANTOPRAZOLE SODIUM 40 MG/1
40 TABLET, DELAYED RELEASE ORAL DAILY
Qty: 10 TABLET | Refills: 0 | Status: SHIPPED | OUTPATIENT
Start: 2024-09-30 | End: 2024-10-10

## 2024-09-30 NOTE — PROGRESS NOTES
"Subjective:      Patient ID: Sari Biswas is a 84 y.o. female.    Vitals:  height is 5' 5" (1.651 m) and weight is 69.4 kg (153 lb). Her oral temperature is 98.6 °F (37 °C). Her blood pressure is 99/62 and her pulse is 60. Her respiration is 16 and oxygen saturation is 97%.     Chief Complaint: Cough    Pt is following up for her cough. She states that her symptoms have worsened since she was last seen here on 09/26/24.  She had a chest x-ray that showed some nonspecific inflammatory changes of the left upper lung.  She was placed on doxycycline that day but she is taking.  She says she is now having pain in the center of her chest whenever she tries to eat, drink or swallow anything.  She has been taking it on an empty stomach.  She denies any history of acid reflux.  She denies any vomiting.  She says her cough is stable.  She denies shortness or breath.  No fever.  She has a follow up appointment with her PCP on October 7th.    Cough  This is a recurrent problem. The current episode started in the past 7 days. The problem has been gradually worsening. The problem occurs constantly. The cough is Non-productive. Associated symptoms include nasal congestion. Pertinent negatives include no chills, fever or shortness of breath. Nothing aggravates the symptoms. The treatment provided no relief.       Constitution: Negative for chills and fever.   Cardiovascular:  Negative for sob on exertion.   Respiratory:  Positive for cough. Negative for shortness of breath.    Gastrointestinal:  Negative for abdominal pain, nausea, vomiting and diarrhea.      Objective:     Physical Exam   Constitutional: She is oriented to person, place, and time. She appears well-developed. She is cooperative.  Non-toxic appearance. She does not appear ill. No distress.   HENT:   Head: Normocephalic and atraumatic.   Ears:   Right Ear: Hearing, tympanic membrane, external ear and ear canal normal.   Left Ear: Hearing, tympanic membrane, " external ear and ear canal normal.   Nose: Nose normal. No mucosal edema, rhinorrhea or nasal deformity. No epistaxis. Right sinus exhibits no maxillary sinus tenderness and no frontal sinus tenderness. Left sinus exhibits no maxillary sinus tenderness and no frontal sinus tenderness.   Mouth/Throat: Uvula is midline, oropharynx is clear and moist and mucous membranes are normal. Mucous membranes are moist. No trismus in the jaw. Normal dentition. No uvula swelling. No oropharyngeal exudate, posterior oropharyngeal edema or posterior oropharyngeal erythema.   Eyes: Conjunctivae and lids are normal. No scleral icterus.   Neck: Trachea normal and phonation normal. Neck supple. No edema present. No erythema present. No neck rigidity present.   Cardiovascular: Normal rate, regular rhythm, normal heart sounds and normal pulses.      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 No respiratory distress. She has no decreased breath sounds. She has no rhonchi.   Abdominal: Normal appearance. Soft. There is no abdominal tenderness.   Musculoskeletal: Normal range of motion.         General: No deformity. Normal range of motion.   Neurological: She is alert and oriented to person, place, and time. She exhibits normal muscle tone. Coordination normal.   Skin: Skin is warm, dry, intact, not diaphoretic and not pale.   Psychiatric: Her speech is normal and behavior is normal. Judgment and thought content normal.   Nursing note and vitals reviewed.  XR CHEST PA AND LATERAL    Result Date: 9/30/2024  EXAMINATION: XR CHEST PA AND LATERAL CLINICAL HISTORY: Cough, unspecified TECHNIQUE: PA and lateral views of the chest were performed. COMPARISON: 09/26/2024 FINDINGS: The cardiomediastinal silhouette is not enlarged, magnified by technique noting calcification of the aorta and surgical change.  Left chest wall pacer noted..  There is obscuration of the costophrenic angles,  may reflect slight effusions..  The trachea is midline.  The lungs are symmetrically expanded bilaterally with coarse interstitial attenuation bilaterally.  There is left basilar subsegmental atelectasis..  No large focal consolidation seen.  There is no pneumothorax.  The osseous structures are remarkable for degenerative change..     1. Chronic appearing interstitial findings, superimposed edema or other pneumonitis is a consideration, no large focal consolidation.  Correlation with any history of COPD/emphysema. Electronically signed by: Brain Spencer MD Date:    09/30/2024 Time:    12:39      Assessment:     1. Esophagitis due to doxycycline    2. Cough, unspecified type        Plan:     Chest x-ray appears improved when compared to previous visit on my interpretation - still awaiting final report.  I believe she has doxycycline induced esophagitis. She declined GI cocktail here. She would like to continue taking it if possible.  She is taking it on an empty stomach and was advised to take it with food.  We will place her on a short supply of Carafate and Protonix to see if we can relieve some of her symptoms.  She will stay in touch with us if she is not improving or for worsening and we would consider changing antibiotic but we have limited options due to cardiac history.  She has follow up with her PCP on 10/07/2024 and she was encouraged to keep that appointment.    Esophagitis due to doxycycline  -     sucralfate (CARAFATE) 1 gram tablet; Take 1 tablet (1 g total) by mouth 4 (four) times daily before meals and nightly.  Dispense: 30 tablet; Refill: 0  -     pantoprazole (PROTONIX) 40 MG tablet; Take 1 tablet (40 mg total) by mouth once daily. for 10 days  Dispense: 10 tablet; Refill: 0    Cough, unspecified type  -     XR CHEST PA AND LATERAL; Future; Expected date: 09/30/2024        Patient Instructions   I think your symptoms are likely from the doxycycline antibiotic causing esophagitis.     Please  start taking antibiotics with food.     Take meds prescribed today as recommended, one of them is 30 minutes prior to meals as needed, the other is once daily.     Take a probiotic like Culturelle or Align.     If you continue to have pain or worsening symptoms, we will need to discuss taking you off Doxycycline but can make a few adjustments for now.    You must understand that you've received an Urgent Care treatment only and that you may be released before all your medical problems are known or treated. You, the patient, will arrange for follow up care as instructed.    Follow up with your PCP or specialty clinic as directed if not improved or as needed. You can call [phone] to schedule an appointment with the appropriate provider.      You, the patient, will arrange for follow up care as instructed.     If your condition worsens or fails to improve we recommend that you receive another evaluation at the ER immediately or contact your PCP to discuss your concerns.     Patient aware of treatment plan and verbalized understanding.

## 2024-09-30 NOTE — PATIENT INSTRUCTIONS
I think your symptoms are likely from the doxycycline antibiotic causing esophagitis.     Please start taking antibiotics with food.     Take meds prescribed today as recommended, one of them is 30 minutes prior to meals as needed, the other is once daily.     Take a probiotic like Culturelle or Align.     If you continue to have pain or worsening symptoms, we will need to discuss taking you off Doxycycline but can make a few adjustments for now.    You must understand that you've received an Urgent Care treatment only and that you may be released before all your medical problems are known or treated. You, the patient, will arrange for follow up care as instructed.    Follow up with your PCP or specialty clinic as directed if not improved or as needed. You can call [phone] to schedule an appointment with the appropriate provider.      You, the patient, will arrange for follow up care as instructed.     If your condition worsens or fails to improve we recommend that you receive another evaluation at the ER immediately or contact your PCP to discuss your concerns.     Patient aware of treatment plan and verbalized understanding.

## 2024-09-30 NOTE — TELEPHONE ENCOUNTER
Spoke with patient and informed her that the xray will be ordered after her appointment per IAIN Escobar NP

## 2024-10-07 ENCOUNTER — HOSPITAL ENCOUNTER (OUTPATIENT)
Dept: RADIOLOGY | Facility: HOSPITAL | Age: 84
Discharge: HOME | End: 2024-10-07
Attending: NURSE PRACTITIONER
Payer: MEDICARE

## 2024-10-07 ENCOUNTER — OFFICE VISIT (OUTPATIENT)
Dept: FAMILY MEDICINE | Facility: CLINIC | Age: 84
End: 2024-10-07
Payer: MEDICARE

## 2024-10-07 ENCOUNTER — TELEPHONE (OUTPATIENT)
Dept: FAMILY MEDICINE | Facility: CLINIC | Age: 84
End: 2024-10-07

## 2024-10-07 VITALS
HEART RATE: 73 BPM | OXYGEN SATURATION: 99 % | BODY MASS INDEX: 24.65 KG/M2 | DIASTOLIC BLOOD PRESSURE: 62 MMHG | WEIGHT: 148.13 LBS | SYSTOLIC BLOOD PRESSURE: 94 MMHG

## 2024-10-07 DIAGNOSIS — J98.4 PNEUMONITIS: ICD-10-CM

## 2024-10-07 DIAGNOSIS — R93.89 ABNORMAL CHEST X-RAY: ICD-10-CM

## 2024-10-07 DIAGNOSIS — R93.89 ABNORMAL CHEST X-RAY: Primary | ICD-10-CM

## 2024-10-07 PROCEDURE — 71046 X-RAY EXAM CHEST 2 VIEWS: CPT | Mod: TC,FY,PO

## 2024-10-07 PROCEDURE — 99999 PR PBB SHADOW E&M-EST. PATIENT-LVL IV: CPT | Mod: PBBFAC,,, | Performed by: NURSE PRACTITIONER

## 2024-10-07 PROCEDURE — 71046 X-RAY EXAM CHEST 2 VIEWS: CPT | Mod: 26,,, | Performed by: RADIOLOGY

## 2024-10-07 NOTE — TELEPHONE ENCOUNTER
----- Message from Venice Escobar NP sent at 10/7/2024  1:10 PM CDT -----  Chest xray shows that the abnormality in the left upper lung is almost resolved. No further imaging is needed.

## 2024-10-07 NOTE — PROGRESS NOTES
Subjective:       Patient ID: Sari Biswas is a 84 y.o. female.    Chief Complaint: Follow-up (Review chest xray)    HPI follow up and repeat cxr for abnormal chest xray at urgent care on 9/26/24 and 9/30/24. First CXR showed irregular opacification to left upper lobe. Repeat CXR on 9/30/24 showed chronic interstitial lung findings vs pneumonitis. Started with cough, runny nose, wheezing and body aches 3 wks ago. Pt was prescribed doxycycline during first urgent care visit. Went back to urgent care on 9/30/24 for dyspepsia related to antibiotic. Was prescribed protonix and carafate with relief. Pt reports she took her last doxycycline last night. Pt reports she feels much better today. Denies cough. Has chronic shortness of breath secondary to CHF but denies any worsening dyspnea.    Past Medical History:   Diagnosis Date    Anticoagulant long-term use     Arthritis     Cancer     CHF (congestive heart failure)     COPD (chronic obstructive pulmonary disease)     Coronary artery disease     Coronary artery disease involving native coronary artery of native heart 07/21/2018    Defibrillator discharge     Depression     DVT (deep venous thrombosis) 2012    left calf    Encounter for blood transfusion     GERD (gastroesophageal reflux disease)     Hematoma     several    History of recurrent UTIs     Hypertension     ICD (implantable cardioverter-defibrillator) in place     Non-recurrent unilateral inguinal hernia without obstruction or gangrene 06/2021    Pacemaker     Paroxysmal atrial fibrillation     Skin cancer     SVT (supraventricular tachycardia)        Past Surgical History:   Procedure Laterality Date    ABLATION N/A 10/26/2018    Procedure: Ablation;  Surgeon: Bonilla Fitzgerald MD;  Location: [facility] CATH LAB;  Service: Cardiology;  Laterality: N/A;  AFL, LOUANN, RFA, PAVAN, MAC, SK ,3prep *Anticipate left femoral approach*    APPENDECTOMY      ATHERECTOMY N/A 7/11/2018    Procedure: Atherectomy;  Surgeon: Kumar  JOCELYNE Colon MD;  Location: [facility] CATH;  Service: Cardiology;  Laterality: N/A;    BREAST BIOPSY Left     over 10 yrs. ago    CARDIAC CATHETERIZATION      CARDIAC SURGERY      angiogram    CATARACT EXTRACTION W/  INTRAOCULAR LENS IMPLANT      CHOLECYSTECTOMY      CORONARY ANGIOGRAPHY N/A 6/19/2018    Procedure: ANGIOGRAM-CORONARY;  Surgeon: Kumar Colon MD;  Location: [facility] CATH;  Service: Cardiology;  Laterality: N/A;    CORONARY ARTERY BYPASS GRAFT      CORONARY ARTERY BYPASS GRAFT (CABG) N/A 7/11/2018    Procedure: CREATION, BYPASS, ARTERIAL, AORTA TO CORONARY, USING GRAFT  [facility];  Surgeon: Aurora Hunter MD;  Location: [facility] OR;  Service: Cardiovascular;  Laterality: N/A;  emergency    CORONARY STENT PLACEMENT N/A 7/11/2018    Procedure: Stent DIXIE coronary;  Surgeon: Kumar Colon MD;  Location: [facility] CATH;  Service: Cardiology;  Laterality: N/A;    EYE SURGERY      cataract    HYSTERECTOMY      INSERTION OF INTRAVASCULAR MICROAXIAL BLOOD PUMP N/A 7/11/2018    Procedure: INSERTION, IMPELLA;  Surgeon: Kumar Colon MD;  Location: [facility] CATH;  Service: Cardiology;  Laterality: N/A;    INSERTION OF TEMPORARY PACEMAKER N/A 7/11/2018    Procedure: INSERTION, PACEMAKER, TEMPORARY;  Surgeon: Kumar Colon MD;  Location: [facility] CATH;  Service: Cardiology;  Laterality: N/A;    KNEE ARTHROSCOPY W/ DEBRIDEMENT  2012    LEFT HEART CATHETERIZATION Left 6/19/2018    Procedure: Left heart cath;  Surgeon: Kumar Colon MD;  Location: [facility] CATH;  Service: Cardiology;  Laterality: Left;    LEFT HEART CATHETERIZATION Left 7/11/2018    Procedure: Left heart cath;  Surgeon: Kumar Colon MD;  Location: [facility] CATH;  Service: Cardiology;  Laterality: Left;    OOPHORECTOMY      PERICARDIOCENTESIS N/A 7/11/2018    Procedure: Pericardiocentesis;  Surgeon: Kumar Colon MD;  Location: [facility] CATH;  Service: Cardiology;  Laterality: N/A;    ROBOT-ASSISTED LAPAROSCOPIC REPAIR OF INGUINAL HERNIA USING DA YESSICA XI Right 6/28/2021     Procedure: XI ROBOTIC REPAIR, HERNIA, INGUINAL;  Surgeon: Keshav Alonzo MD;  Location: [facility] OR;  Service: General;  Laterality: Right;    TRANSESOPHAGEAL ECHOCARDIOGRAPHY N/A 10/26/2018    Procedure: ECHOCARDIOGRAM, TRANSESOPHAGEAL;  Surgeon: Bonilla Fitzgerald MD;  Location: [facility] CATH LAB;  Service: Cardiology;  Laterality: N/A;    TREATMENT OF CARDIAC ARRHYTHMIA N/A 10/8/2018    Procedure: Cardioversion/Defibrillation;  Surgeon: Rodo Singleton MD;  Location: [facility];  Service: Cardiology;  Laterality: N/A;       Review of patient's allergies indicates:  No Known Allergies    Social History     Socioeconomic History    Marital status:    Tobacco Use    Smoking status: Former     Current packs/day: 0.00     Average packs/day: 1 pack/day for 40.0 years (40.0 ttl pk-yrs)     Types: Cigarettes     Start date:      Quit date:      Years since quittin.7    Smokeless tobacco: Never    Tobacco comments:     quit smoking in .   Substance and Sexual Activity    Alcohol use: Yes     Comment: seldom    Drug use: No     Social Drivers of Health     Financial Resource Strain: Low Risk  (2024)    Overall Financial Resource Strain (CARDIA)     Difficulty of Paying Living Expenses: Not very hard   Food Insecurity: No Food Insecurity (2024)    Hunger Vital Sign     Worried About Running Out of Food in the Last Year: Never true     Ran Out of Food in the Last Year: Never true   Transportation Needs: No Transportation Needs (2024)    PRAPARE - Transportation     Lack of Transportation (Medical): No     Lack of Transportation (Non-Medical): No   Physical Activity: Insufficiently Active (2024)    Exercise Vital Sign     Days of Exercise per Week: 5 days     Minutes of Exercise per Session: 20 min   Stress: No Stress Concern Present (11/3/2023)    [employer] - Occupational Stress Questionnaire     Feeling of Stress : Only a little   Housing Stability:  Low Risk  (1/22/2024)    Housing Stability Vital Sign     Unable to Pay for Housing in the Last Year: No     Number of Places Lived in the Last Year: 1     Unstable Housing in the Last Year: No       Current Outpatient Medications on File Prior to Visit   Medication Sig Dispense Refill    BIOTIN ORAL Take 1 capsule by mouth once daily.       calcium-vitamin D3 (OS-MILO 500 + D3) 500 mg-5 mcg (200 unit) per tablet Take 1 tablet by mouth 2 (two) times daily with meals.      carboxymethylcellulose (REFRESH PLUS) 0.5 % Dpet 1 drop 3 (three) times daily as needed.      clobetasoL (CLOBEX) 0.05 % shampoo Wash scalp 2 to 3 times a week as needed for rash and itching as scalp. 118 mL 3    clobetasoL (CLOBEX) 0.05 % shampoo Wash scalp once weekly. Avoid use of medication on face, body folds, groin/genitalia. 118 mL 5    clopidogreL (PLAVIX) 75 mg tablet Take 1 tablet (75 mg total) by mouth once daily. 90 tablet 3    cranberry 400 mg Cap Take 1 capsule by mouth once daily.       fluocinonide (LIDEX) 0.05 % external solution Apply topically 2 (two) times daily. 60 mL 0    furosemide (LASIX) 20 MG tablet Take 1 tablet (20 mg total) by mouth once daily. 90 tablet 3    hydrocortisone 2.5 % cream       ketoconazole (NIZORAL) 2 % cream       ketoconazole (NIZORAL) 2 % shampoo Apply topically twice a week. 120 mL 6    metoprolol succinate (TOPROL-XL) 25 MG 24 hr tablet Take 1 tablet (25 mg total) by mouth once daily. 30 tablet 11    pantoprazole (PROTONIX) 40 MG tablet Take 1 tablet (40 mg total) by mouth once daily. for 10 days 10 tablet 0    rivaroxaban (XARELTO) 20 mg Tab Take 1 tablet (20 mg total) by mouth daily with dinner or evening meal. 31 tablet 11    rosuvastatin (CRESTOR) 5 MG tablet Take 1 tablet (5 mg total) by mouth every evening. 90 tablet 3    sacubitriL-valsartan (ENTRESTO) 24-26 mg per tablet Take 1 tablet by mouth 2 (two) times daily. 180 tablet 3    sotaloL (BETAPACE) 80 MG tablet Take 1 tablet (80 mg total) by  mouth 2 (two) times daily. 180 tablet 3    spironolactone (ALDACTONE) 25 MG tablet Take 0.5 tablets (12.5 mg total) by mouth once daily. 46 tablet 3    vit C,H-Sb-hyszb-lutein-zeaxan (PRESERVISION AREDS 2) 250-90-40-1 mg Cap Take by mouth 2 (two) times daily.      vitamin D (VITAMIN D3) 1000 units Tab Take 1,000 Units by mouth once daily.      roflumilast 0.3 % Foam Apply to affected area on  scalp once daily as needed for rash and itching. (Patient not taking: Reported on 10/7/2024) 60 g 5    [DISCONTINUED] doxycycline (VIBRAMYCIN) 100 MG Cap Take 1 capsule (100 mg total) by mouth 2 (two) times daily. for 10 days 20 capsule 0    [DISCONTINUED] RESTASIS 0.05 % ophthalmic emulsion Place 1 drop into both eyes 2 (two) times daily.      [DISCONTINUED] sucralfate (CARAFATE) 1 gram tablet Take 1 tablet (1 g total) by mouth 4 (four) times daily before meals and nightly. (Patient not taking: Reported on 10/7/2024) 30 tablet 0     No current facility-administered medications on file prior to visit.       Family History   Problem Relation Name Age of Onset    Cancer Father          lung    Cancer Sister          breast    Breast cancer Sister  78    Cancer Brother          brain    Macular degeneration Brother      Breast cancer Paternal Aunt      Glaucoma Neg Hx      Retinal detachment Neg Hx      Melanoma Neg Hx      Psoriasis Neg Hx      Lupus Neg Hx      Eczema Neg Hx         Review of Systems   Constitutional: Negative.    HENT:  Positive for postnasal drip.    Eyes: Negative.    Respiratory: Negative.     Cardiovascular: Negative.    Gastrointestinal: Negative.    Endocrine: Negative.    Genitourinary: Negative.    Musculoskeletal: Negative.    Skin: Negative.    Neurological: Negative.    Psychiatric/Behavioral: Negative.         Objective:      BP 94/62   Pulse 73   Wt 67.2 kg (148 lb 2.4 oz)   SpO2 99%   BMI 24.65 kg/m²   Physical Exam  Vitals and nursing note reviewed.   Constitutional:       General: She is not  in acute distress.     Appearance: Normal appearance. She is well-groomed.   HENT:      Head: Normocephalic.      Right Ear: Tympanic membrane, ear canal and external ear normal.      Left Ear: Tympanic membrane, ear canal and external ear normal.      Nose: Nose normal.      Mouth/Throat:      Lips: Pink.      Mouth: Mucous membranes are moist.      Pharynx: Oropharynx is clear. No oropharyngeal exudate or posterior oropharyngeal erythema.   Eyes:      Extraocular Movements: Extraocular movements intact.      Conjunctiva/sclera: Conjunctivae normal.      Pupils: Pupils are equal, round, and reactive to light.   Cardiovascular:      Rate and Rhythm: Normal rate and regular rhythm.      Heart sounds: Normal heart sounds. No murmur heard.  Pulmonary:      Effort: Pulmonary effort is normal.      Breath sounds: Normal breath sounds.   Chest:      Chest wall: No tenderness.   Abdominal:      General: Bowel sounds are normal.      Palpations: Abdomen is soft.      Tenderness: There is no abdominal tenderness.   Musculoskeletal:         General: No swelling or tenderness. Normal range of motion.      Cervical back: Normal range of motion and neck supple.      Right lower leg: No edema.      Left lower leg: No edema.   Lymphadenopathy:      Cervical: No cervical adenopathy.   Skin:     General: Skin is warm and dry.   Neurological:      General: No focal deficit present.      Mental Status: She is alert and oriented to person, place, and time. Mental status is at baseline.      Gait: Gait is intact.   Psychiatric:         Mood and Affect: Mood normal.         Behavior: Behavior normal.         Assessment:       1. Abnormal chest x-ray    2. Pneumonitis        Plan:       Abnormal chest x-ray  -     X-Ray Chest PA And Lateral; Future; Expected date: 10/07/2024    Pneumonitis  -     X-Ray Chest PA And Lateral; Future; Expected date: 10/07/2024          Repeat CXR ordered. No further treatment at this time    Refused flu shot  at this time and would like to wait another week.

## 2024-10-11 ENCOUNTER — TELEPHONE (OUTPATIENT)
Dept: DERMATOLOGY | Facility: CLINIC | Age: 84
End: 2024-10-11
Payer: MEDICARE

## 2024-10-14 ENCOUNTER — OFFICE VISIT (OUTPATIENT)
Facility: CLINIC | Age: 84
End: 2024-10-14
Payer: MEDICARE

## 2024-10-14 DIAGNOSIS — L40.8 SEBOPSORIASIS: Primary | ICD-10-CM

## 2024-10-14 DIAGNOSIS — D23.39: ICD-10-CM

## 2024-10-14 PROCEDURE — G2211 COMPLEX E/M VISIT ADD ON: HCPCS | Mod: S$GLB,,, | Performed by: DERMATOLOGY

## 2024-10-14 PROCEDURE — 3288F FALL RISK ASSESSMENT DOCD: CPT | Mod: CPTII,S$GLB,, | Performed by: DERMATOLOGY

## 2024-10-14 PROCEDURE — 99999 PR PBB SHADOW E&M-EST. PATIENT-LVL III: CPT | Mod: PBBFAC,,, | Performed by: DERMATOLOGY

## 2024-10-14 PROCEDURE — 99214 OFFICE O/P EST MOD 30 MIN: CPT | Mod: S$GLB,,, | Performed by: DERMATOLOGY

## 2024-10-14 PROCEDURE — 1159F MED LIST DOCD IN RCRD: CPT | Mod: CPTII,S$GLB,, | Performed by: DERMATOLOGY

## 2024-10-14 PROCEDURE — 1101F PT FALLS ASSESS-DOCD LE1/YR: CPT | Mod: CPTII,S$GLB,, | Performed by: DERMATOLOGY

## 2024-10-14 RX ORDER — HYDROCORTISONE 25 MG/G
CREAM TOPICAL 2 TIMES DAILY PRN
Qty: 28 G | Refills: 3 | Status: SHIPPED | OUTPATIENT
Start: 2024-10-14

## 2024-10-14 RX ORDER — KETOCONAZOLE 20 MG/ML
SHAMPOO, SUSPENSION TOPICAL
Qty: 120 ML | Refills: 5 | Status: SHIPPED | OUTPATIENT
Start: 2024-10-14

## 2024-10-14 RX ORDER — FLUOCINONIDE TOPICAL SOLUTION USP, 0.05% 0.5 MG/ML
SOLUTION TOPICAL 2 TIMES DAILY PRN
Qty: 60 ML | Refills: 3 | Status: SHIPPED | OUTPATIENT
Start: 2024-10-14

## 2024-10-14 RX ORDER — KETOCONAZOLE 20 MG/G
CREAM TOPICAL 2 TIMES DAILY
Qty: 60 G | Refills: 3 | Status: SHIPPED | OUTPATIENT
Start: 2024-10-14

## 2024-10-14 NOTE — PROGRESS NOTES
Subjective:       Patient ID:  Sari Biswas is a 84 y.o. female who presents for   Chief Complaint   Patient presents with    Spot     Pt states she has spots on her forehead, ear, scalp and back   Spots are itchy     Skin Check     UBSC     HPI    Established patient.  Here for f/u sebopsoriasis at scalp. Zoryve too expensive. Requesting refill of past Lidex solution. C/w ketoconazole and clobetasol shampoo.   Also using ketoconazole and HCT creams for flares at brows, ears.  Persistent pruritus.    +skin cancer   Type unknown, on leg, unsure of leg and type, many years ago    +AK  Cryotherapy     Review of Systems   Constitutional:  Negative for fever, chills and fatigue.   Respiratory:  Negative for cough and shortness of breath.    Skin:  Positive for activity-related sunscreen use.   Hematologic/Lymphatic: Bruises/bleeds easily (plavix, xarelto).        Objective:    Physical Exam   Constitutional: She appears well-developed and well-nourished. No distress.   Neurological: She is alert and oriented to person, place, and time. She is not disoriented.   Psychiatric: She has a normal mood and affect.   Skin:   Areas Examined (abnormalities noted in diagram):   Scalp / Hair Palpated and Inspected  Head / Face Inspection Performed              Diagram Legend     Erythematous scaling macule/papule c/w actinic keratosis       Vascular papule c/w angioma      Pigmented verrucoid papule/plaque c/w seborrheic keratosis      Yellow umbilicated papule c/w sebaceous hyperplasia      Irregularly shaped tan macule c/w lentigo     1-2 mm smooth white papules consistent with Milia      Movable subcutaneous cyst with punctum c/w epidermal inclusion cyst      Subcutaneous movable cyst c/w pilar cyst      Firm pink to brown papule c/w dermatofibroma      Pedunculated fleshy papule(s) c/w skin tag(s)      Evenly pigmented macule c/w junctional nevus     Mildly variegated pigmented, slightly irregular-bordered macule c/w  mildly atypical nevus      Flesh colored to evenly pigmented papule c/w intradermal nevus       Pink pearly papule/plaque c/w basal cell carcinoma      Erythematous hyperkeratotic cursted plaque c/w SCC      Surgical scar with no sign of skin cancer recurrence      Open and closed comedones      Inflammatory papules and pustules      Verrucoid papule consistent consistent with wart     Erythematous eczematous patches and plaques     Dystrophic onycholytic nail with subungual debris c/w onychomycosis     Umbilicated papule    Erythematous-base heme-crusted tan verrucoid plaque consistent with inflamed seborrheic keratosis     Erythematous Silvery Scaling Plaque c/w Psoriasis     See annotation      Assessment / Plan:        Sebopsoriasis  -     fluocinonide (LIDEX) 0.05 % external solution; Apply topically 2 (two) times daily as needed (scalp rash, itching). Avoid use of medication on face, body folds, groin/genitalia.  Dispense: 60 mL; Refill: 3  -     ketoconazole (NIZORAL) 2 % shampoo; Wash scalp with medicated shampoo at least 1-2x/week - let sit at least 5 minutes prior to rinsing.  Dispense: 120 mL; Refill: 5  -     ketoconazole (NIZORAL) 2 % cream; Apply topically 2 (two) times daily. Mix 50/50 with hydrocortisone cream twice daily as needed for flares.  Dispense: 60 g; Refill: 3  -     hydrocortisone 2.5 % cream; Apply topically 2 (two) times daily as needed (itchy rash at ears, brows). Mix 50/50 with ketoconazole cream.  Dispense: 28 g; Refill: 3  Scalp clear but pruritus persists. Possible neurogenic pruritus component. Prior seb derm / sebopsoriasis appreciated on exam, however.   - Discussed diagnosis, etiology, and treatment options.  - C/w ketoconazole shampoo 1-2x weekly; clobetasol shampoo 2x weekly.   - Resume lidex solution BID PRN rash, itching at scalp.   - Start ketoconazole cream once daily to itch, rash prone areas of ears, brows. Mix 50/50 with hct cream BID PRN flares.   - Counseled on  potential SE of medication(s) and instructed on use.     Blue nevus of forehead  Benign-appearing and stable on recheck. C/w monitoring            Follow up in about 6 months (around 4/14/2025) for annual tbse.

## 2024-10-14 NOTE — PATIENT INSTRUCTIONS
Wash itch prone areas of scalp with rx ketoconazole shampoo and clobetasol shampoo each 1-2x weekly. Let sit a few minutes prior to rinsing out.     Use fluocinonide solution 2x daily to scalp as needed for rash, itching.     Use ketoconazole cream daily to itch prone areas of ears, brows. Mix with hydrocortisone cream 2x daily as needed for flare ups.

## 2024-11-03 ENCOUNTER — CLINICAL SUPPORT (OUTPATIENT)
Dept: CARDIOLOGY | Facility: HOSPITAL | Age: 84
End: 2024-11-03
Payer: MEDICARE

## 2024-11-03 ENCOUNTER — HOSPITAL ENCOUNTER (OUTPATIENT)
Dept: CARDIOLOGY | Facility: HOSPITAL | Age: 84
Discharge: HOME | End: 2024-11-03
Attending: INTERNAL MEDICINE
Payer: MEDICARE

## 2024-11-03 DIAGNOSIS — Z95.810 PRESENCE OF AUTOMATIC (IMPLANTABLE) CARDIAC DEFIBRILLATOR: ICD-10-CM

## 2024-11-03 PROCEDURE — 93296 REM INTERROG EVL PM/IDS: CPT | Mod: PO | Performed by: INTERNAL MEDICINE

## 2024-11-03 PROCEDURE — 93295 DEV INTERROG REMOTE 1/2/MLT: CPT | Mod: ,,, | Performed by: INTERNAL MEDICINE

## 2024-11-05 LAB
OHS CV AF BURDEN PERCENT: < 1
OHS CV DC REMOTE DEVICE TYPE: NORMAL
OHS CV ICD SHOCK: NO
OHS CV RV PACING PERCENT: 1 %

## 2024-11-22 ENCOUNTER — PATIENT MESSAGE (OUTPATIENT)
Dept: PODIATRY | Facility: CLINIC | Age: 84
End: 2024-11-22
Payer: MEDICARE

## 2024-11-25 ENCOUNTER — OFFICE VISIT (OUTPATIENT)
Dept: URGENT CARE | Facility: CLINIC | Age: 84
End: 2024-11-25
Payer: MEDICARE

## 2024-11-25 VITALS
TEMPERATURE: 98 F | DIASTOLIC BLOOD PRESSURE: 60 MMHG | HEART RATE: 87 BPM | SYSTOLIC BLOOD PRESSURE: 102 MMHG | HEIGHT: 65 IN | WEIGHT: 148 LBS | OXYGEN SATURATION: 100 % | RESPIRATION RATE: 16 BRPM | BODY MASS INDEX: 24.66 KG/M2
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
## 2024-11-25 DIAGNOSIS — L08.9 DOG BITE OF RIGHT LOWER LEG WITH INFECTION, INITIAL ENCOUNTER: Primary | ICD-10-CM

## 2024-11-25 DIAGNOSIS — S81.851A DOG BITE OF RIGHT LOWER LEG WITH INFECTION, INITIAL ENCOUNTER: Primary | ICD-10-CM

## 2024-11-25 PROCEDURE — 99213 OFFICE O/P EST LOW 20 MIN: CPT | Mod: S$GLB,,, | Performed by: NURSE PRACTITIONER

## 2024-11-25 RX ORDER — MUPIROCIN 20 MG/G
OINTMENT TOPICAL
Qty: 22 G | Refills: 0 | Status: SHIPPED | OUTPATIENT
Start: 2024-11-25 | End: 2024-12-02

## 2024-11-25 RX ORDER — AMOXICILLIN AND CLAVULANATE POTASSIUM 875; 125 MG/1; MG/1
1 TABLET, FILM COATED ORAL 2 TIMES DAILY
Qty: 20 TABLET | Refills: 0 | Status: SHIPPED | OUTPATIENT
Start: 2024-11-25 | End: 2024-12-05

## 2024-11-25 NOTE — PROGRESS NOTES
"Subjective:      Patient ID: Sari Biswas is a 84 y.o. female.    Vitals:  height is 5' 5" (1.651 m) and weight is 67.1 kg (148 lb). Her oral temperature is 98.3 °F (36.8 °C). Her blood pressure is 102/60 and her pulse is 87. Her respiration is 16 and oxygen saturation is 100%.     Chief Complaint: Leg Injury (Right )    Pt came in today with complaints of a right leg wound that occurred three week ago from a dog bite. She has not taken any medications for her symptom.  Tetanus shot is up-to-date.  Did incident occurred in her yd and it was a neighbor's dog.  Patient does not want to report incident and reports that the dog is up-to-date on vaccinations.    Wound Check  She was originally treated more than 14 days ago. There has been bloody discharge from the wound. There is no redness present. There is no pain present.     Skin:  Positive for wound and erythema. Negative for color change, pale, rash, abrasion, laceration, lesion, skin thickening/induration, puncture wound, bruising, abscess, avulsion and hives.   Allergic/Immunologic: Negative for hives.      Objective:     Physical Exam   Constitutional: She is oriented to person, place, and time. She appears well-developed.   HENT:   Head: Normocephalic and atraumatic. Head is without abrasion, without contusion and without laceration.   Ears:   Right Ear: External ear normal.   Left Ear: External ear normal.   Nose: Nose normal.   Mouth/Throat: Oropharynx is clear and moist and mucous membranes are normal.   Eyes: Conjunctivae, EOM and lids are normal. Pupils are equal, round, and reactive to light.   Neck: Trachea normal and phonation normal. Neck supple.   Cardiovascular: Normal rate, regular rhythm and normal heart sounds.   Pulmonary/Chest: Effort normal and breath sounds normal. No stridor. No respiratory distress.   Musculoskeletal: Normal range of motion.         General: Normal range of motion.   Neurological: She is alert and oriented to person, " place, and time.   Skin: Skin is warm, dry, intact and no rash. Capillary refill takes less than 2 seconds. erythema No abrasion, No burn, No bruising, No ecchymosis, No lesion and No petechiae No clubbing and No cyanosis         Psychiatric: Her speech is normal and behavior is normal. Judgment and thought content normal.   Nursing note and vitals reviewed.      Assessment:     1. Dog bite of right lower leg with infection, initial encounter        Plan:     Reviewed [place] website regulations for dog bite reporting.  Patient does not want to dog bite to be reported and reports that the dog belongs to her neighbor in the dog's shots were up-to-date.      Wound is cleansed with Hibiclens in the clinic today.  Patient was advised to stop using hydrogen peroxide.  Wound is then dressed with mupirocin and a nonadherent dressing.  Patient was educated on wound care.  Patient was also given strict emergency room instructions.  Patient was advised if she has worsening symptoms while on the oral antibiotics to go to the emergency room for further evaluation and treatment.      Antibiotic dosage guidelines are reviewed on up-to-date.  Patient was last creatinine clearance was greater than 30 regular Augmentin dose prescribed.      Dog bite of right lower leg with infection, initial encounter  -     amoxicillin-clavulanate 875-125mg (AUGMENTIN) 875-125 mg per tablet; Take 1 tablet by mouth 2 (two) times daily. for 10 days  Dispense: 20 tablet; Refill: 0  -     mupirocin (BACTROBAN) 2 % ointment; Apply to affected area 3 times daily  Dispense: 22 g; Refill: 0      Patient Instructions   Cleanse with Hibiclens.  Apply topical mupirocin ointment.    Oral antibiotics as discussed.    If symptoms worsen go to the emergency room as discussed.        You must understand that you've received an Urgent Care treatment only and that you may be released before all your medical problems are known or treated. You, the  patient, will arrange for follow up care as instructed.  Follow up with your PCP or specialty clinic as directed in the next 1-2 weeks if not improved or as needed.  You can call [phone] to schedule an appointment with the appropriate provider.  If your condition worsens we recommend that you receive another evaluation at the emergency room immediately or contact your primary medical clinics after hours call service to discuss your concerns.  Please return here or go to the Emergency Department for any concerns or worsening of condition.

## 2024-11-25 NOTE — PATIENT INSTRUCTIONS
Cleanse with Hibiclens.  Apply topical mupirocin ointment.    Oral antibiotics as discussed.    If symptoms worsen go to the emergency room as discussed.        You must understand that you've received an Urgent Care treatment only and that you may be released before all your medical problems are known or treated. You, the patient, will arrange for follow up care as instructed.  Follow up with your PCP or specialty clinic as directed in the next 1-2 weeks if not improved or as needed.  You can call [phone] to schedule an appointment with the appropriate provider.  If your condition worsens we recommend that you receive another evaluation at the emergency room immediately or contact your primary medical clinics after hours call service to discuss your concerns.  Please return here or go to the Emergency Department for any concerns or worsening of condition.

## 2024-12-05 ENCOUNTER — OFFICE VISIT (OUTPATIENT)
Dept: FAMILY MEDICINE | Facility: CLINIC | Age: 84
End: 2024-12-05
Payer: MEDICARE

## 2024-12-05 ENCOUNTER — HOSPITAL ENCOUNTER (OUTPATIENT)
Dept: RADIOLOGY | Facility: HOSPITAL | Age: 84
Discharge: HOME | End: 2024-12-05
Attending: OBSTETRICS & GYNECOLOGY
Payer: MEDICARE

## 2024-12-05 VITALS
SYSTOLIC BLOOD PRESSURE: 126 MMHG | OXYGEN SATURATION: 97 % | HEART RATE: 73 BPM | BODY MASS INDEX: 25.14 KG/M2 | HEIGHT: 65 IN | DIASTOLIC BLOOD PRESSURE: 62 MMHG | WEIGHT: 150.88 LBS

## 2024-12-05 DIAGNOSIS — Z12.31 SCREENING MAMMOGRAM FOR BREAST CANCER: ICD-10-CM

## 2024-12-05 DIAGNOSIS — I10 PRIMARY HYPERTENSION: Primary | ICD-10-CM

## 2024-12-05 DIAGNOSIS — I48.0 PAROXYSMAL ATRIAL FIBRILLATION: ICD-10-CM

## 2024-12-05 DIAGNOSIS — N18.31 CHRONIC KIDNEY DISEASE, STAGE 3A: ICD-10-CM

## 2024-12-05 DIAGNOSIS — E78.2 MIXED HYPERLIPIDEMIA: ICD-10-CM

## 2024-12-05 PROCEDURE — 99214 OFFICE O/P EST MOD 30 MIN: CPT | Mod: S$GLB,,, | Performed by: FAMILY MEDICINE

## 2024-12-05 PROCEDURE — 77067 SCR MAMMO BI INCL CAD: CPT | Mod: 26,,, | Performed by: RADIOLOGY

## 2024-12-05 PROCEDURE — 3078F DIAST BP <80 MM HG: CPT | Mod: CPTII,S$GLB,, | Performed by: FAMILY MEDICINE

## 2024-12-05 PROCEDURE — 3074F SYST BP LT 130 MM HG: CPT | Mod: CPTII,S$GLB,, | Performed by: FAMILY MEDICINE

## 2024-12-05 PROCEDURE — G2211 COMPLEX E/M VISIT ADD ON: HCPCS | Mod: S$GLB,,, | Performed by: FAMILY MEDICINE

## 2024-12-05 PROCEDURE — 1101F PT FALLS ASSESS-DOCD LE1/YR: CPT | Mod: CPTII,S$GLB,, | Performed by: FAMILY MEDICINE

## 2024-12-05 PROCEDURE — 1126F AMNT PAIN NOTED NONE PRSNT: CPT | Mod: CPTII,S$GLB,, | Performed by: FAMILY MEDICINE

## 2024-12-05 PROCEDURE — 77063 BREAST TOMOSYNTHESIS BI: CPT | Mod: TC,PO

## 2024-12-05 PROCEDURE — 3288F FALL RISK ASSESSMENT DOCD: CPT | Mod: CPTII,S$GLB,, | Performed by: FAMILY MEDICINE

## 2024-12-05 PROCEDURE — 1159F MED LIST DOCD IN RCRD: CPT | Mod: CPTII,S$GLB,, | Performed by: FAMILY MEDICINE

## 2024-12-05 PROCEDURE — 99999 PR PBB SHADOW E&M-EST. PATIENT-LVL IV: CPT | Mod: PBBFAC,,, | Performed by: FAMILY MEDICINE

## 2024-12-05 PROCEDURE — 77063 BREAST TOMOSYNTHESIS BI: CPT | Mod: 26,,, | Performed by: RADIOLOGY

## 2024-12-05 NOTE — PROGRESS NOTES
Subjective:       Patient ID: Sari Biswas is a 84 y.o. female.    Chief Complaint: Follow-up (6 month )    Here for follow up multiple chronic medical issues. Doing well overall and in normal state of health.      Follow-up  Pertinent negatives include no chest pain, chills, coughing or fever.     Review of Systems   Constitutional:  Negative for chills and fever.   Respiratory:  Negative for cough, chest tightness and shortness of breath.    Cardiovascular:  Negative for chest pain, palpitations and leg swelling.   Endocrine: Negative for cold intolerance and heat intolerance.   Psychiatric/Behavioral:  Negative for decreased concentration. The patient is not nervous/anxious.        Objective:      Physical Exam  Vitals and nursing note reviewed.   Constitutional:       Appearance: She is well-developed.   HENT:      Head: Normocephalic and atraumatic.   Cardiovascular:      Rate and Rhythm: Normal rate and regular rhythm.      Heart sounds: Normal heart sounds.   Pulmonary:      Effort: Pulmonary effort is normal.      Breath sounds: Normal breath sounds.   Psychiatric:         Mood and Affect: Mood normal.         Behavior: Behavior normal.         Assessment:       1. Primary hypertension    2. Mixed hyperlipidemia    3. Paroxysmal atrial fibrillation    4. Chronic kidney disease, stage 3a        Plan:       Primary hypertension    Mixed hyperlipidemia    Paroxysmal atrial fibrillation    Chronic kidney disease, stage 3a      Htn stable  Lipid stable  Paf stable  Ckd stable  Will monitor chronic medical issues and continue current plan of care.  Visit today included increased complexity associated with the care of the episodic problem htn addressed and managing the longitudinal care of the patient due to the serious and/or complex managed problem(s) as above.    She elects 1 yr f/u  To see cards with labs in January   Follow up in about 1 year (around 12/5/2025), or if symptoms worsen or fail to improve.

## 2024-12-18 ENCOUNTER — OFFICE VISIT (OUTPATIENT)
Dept: FAMILY MEDICINE | Facility: CLINIC | Age: 84
End: 2024-12-18
Payer: MEDICARE

## 2024-12-18 ENCOUNTER — OFFICE VISIT (OUTPATIENT)
Dept: OPTOMETRY | Facility: CLINIC | Age: 84
End: 2024-12-18
Payer: MEDICARE

## 2024-12-18 VITALS
SYSTOLIC BLOOD PRESSURE: 122 MMHG | BODY MASS INDEX: 25.23 KG/M2 | WEIGHT: 151.44 LBS | OXYGEN SATURATION: 99 % | HEART RATE: 75 BPM | HEIGHT: 65 IN | DIASTOLIC BLOOD PRESSURE: 74 MMHG

## 2024-12-18 DIAGNOSIS — I48.0 PAROXYSMAL ATRIAL FIBRILLATION: ICD-10-CM

## 2024-12-18 DIAGNOSIS — Z00.00 ENCOUNTER FOR PREVENTIVE HEALTH EXAMINATION: Primary | ICD-10-CM

## 2024-12-18 DIAGNOSIS — I10 PRIMARY HYPERTENSION: ICD-10-CM

## 2024-12-18 DIAGNOSIS — I47.20 VENTRICULAR TACHYCARDIA: ICD-10-CM

## 2024-12-18 DIAGNOSIS — I25.5 ISCHEMIC DILATED CARDIOMYOPATHY: ICD-10-CM

## 2024-12-18 DIAGNOSIS — Z95.810 ICD (IMPLANTABLE CARDIOVERTER-DEFIBRILLATOR) IN PLACE: ICD-10-CM

## 2024-12-18 DIAGNOSIS — I50.42 CHRONIC COMBINED SYSTOLIC AND DIASTOLIC HEART FAILURE: ICD-10-CM

## 2024-12-18 DIAGNOSIS — I42.0 ISCHEMIC DILATED CARDIOMYOPATHY: ICD-10-CM

## 2024-12-18 DIAGNOSIS — R26.9 ABNORMALITY OF GAIT AND MOBILITY: ICD-10-CM

## 2024-12-18 DIAGNOSIS — I70.0 ATHEROSCLEROSIS OF AORTA: ICD-10-CM

## 2024-12-18 DIAGNOSIS — Z00.00 ENCOUNTER FOR MEDICARE ANNUAL WELLNESS EXAM: ICD-10-CM

## 2024-12-18 DIAGNOSIS — E78.2 MIXED HYPERLIPIDEMIA: ICD-10-CM

## 2024-12-18 DIAGNOSIS — Z96.1 PSEUDOPHAKIA OF BOTH EYES: ICD-10-CM

## 2024-12-18 DIAGNOSIS — H35.3131 EARLY DRY STAGE NONEXUDATIVE AGE-RELATED MACULAR DEGENERATION OF BOTH EYES: Primary | ICD-10-CM

## 2024-12-18 DIAGNOSIS — H04.123 BILATERAL DRY EYES: ICD-10-CM

## 2024-12-18 DIAGNOSIS — I25.10 CORONARY ARTERY DISEASE INVOLVING NATIVE CORONARY ARTERY OF NATIVE HEART WITHOUT ANGINA PECTORIS: ICD-10-CM

## 2024-12-18 DIAGNOSIS — H52.7 REFRACTIVE ERROR: ICD-10-CM

## 2024-12-18 DIAGNOSIS — N18.31 CHRONIC KIDNEY DISEASE, STAGE 3A: ICD-10-CM

## 2024-12-18 PROCEDURE — 99999 PR PBB SHADOW E&M-EST. PATIENT-LVL III: CPT | Mod: PBBFAC,,, | Performed by: OPTOMETRIST

## 2024-12-18 PROCEDURE — 92014 COMPRE OPH EXAM EST PT 1/>: CPT | Mod: S$GLB,,, | Performed by: OPTOMETRIST

## 2024-12-18 PROCEDURE — 1101F PT FALLS ASSESS-DOCD LE1/YR: CPT | Mod: CPTII,S$GLB,, | Performed by: OPTOMETRIST

## 2024-12-18 PROCEDURE — 99999 PR PBB SHADOW E&M-EST. PATIENT-LVL V: CPT | Mod: PBBFAC,,, | Performed by: NURSE PRACTITIONER

## 2024-12-18 PROCEDURE — 3288F FALL RISK ASSESSMENT DOCD: CPT | Mod: CPTII,S$GLB,, | Performed by: OPTOMETRIST

## 2024-12-18 PROCEDURE — 1126F AMNT PAIN NOTED NONE PRSNT: CPT | Mod: CPTII,S$GLB,, | Performed by: OPTOMETRIST

## 2024-12-18 PROCEDURE — 92015 DETERMINE REFRACTIVE STATE: CPT | Mod: S$GLB,,, | Performed by: OPTOMETRIST

## 2024-12-18 RX ORDER — SACUBITRIL AND VALSARTAN 24; 26 MG/1; MG/1
1 TABLET, FILM COATED ORAL 2 TIMES DAILY
Qty: 180 TABLET | Refills: 3 | Status: CANCELLED | OUTPATIENT
Start: 2024-12-18

## 2024-12-18 NOTE — PROGRESS NOTES
"Sari Biswas presented for an initial Medicare AWV today. The following components were reviewed and updated:    Medical history  Family History  Social history  Allergies and Current Medications  Health Risk Assessment  Health Maintenance  Care Team    **See Completed Assessments for Annual Wellness visit with in the encounter summary    The following assessments were completed:  Depression Screening  Cognitive function Screening    Timed Get Up Test  Whisper Test    Opioid documentation:  Patient does not have a current opioid prescription.        Vitals:    12/18/24 1100   BP: 122/74   BP Location: Right arm   Patient Position: Sitting   Pulse: 75   SpO2: 99%   Weight: 68.7 kg (151 lb 7.3 oz)   Height: 5' 5" (1.651 m)     Body mass index is 25.2 kg/m².     Physical Exam  Vitals reviewed.   Pulmonary:      Effort: Pulmonary effort is normal. No respiratory distress.   Neurological:      Mental Status: She is alert and oriented to person, place, and time.   Psychiatric:         Mood and Affect: Mood normal.         Behavior: Behavior normal.         Thought Content: Thought content normal.         Judgment: Judgment normal.     Diagnoses and health risks identified today and associated recommendations/orders:  1. Encounter for preventive health examination  Reviewed and discussed health maintenance.      2. Encounter for Medicare annual wellness exam  - Ambulatory Referral/Consult to Enhanced Annual Wellness Visit (eAWV)    3. Chronic kidney disease, stage 3a  Stable- continue current treatment and follow up routinely with PCP   Avoid NSAIDs and increase fluids  BMP  Lab Results   Component Value Date     07/24/2024    K 4.8 07/24/2024     07/24/2024    CO2 28 07/24/2024    BUN 27 (H) 07/24/2024    CREATININE 1.2 07/24/2024    CALCIUM 9.4 07/24/2024    ANIONGAP 5 (L) 07/24/2024    EGFRNORACEVR 44.4 (A) 07/24/2024      4. Atherosclerosis of aorta  Stable- continue current treatment and follow up " routinely with PCP   Htn and hld controlled     5. Chronic combined systolic and diastolic heart failure  6. Coronary artery disease involving native coronary artery of native heart without angina pectoris  Stable- continue current treatment and follow up routinely with PCP and cardiology ()  Plavix 75mg daily, lasix 20mg daily, Toprol xl 25mg daily, xeralto 20mg daily, crestor 5mg daily entresto 24-26mg bid, sotalol 80mg bid, aldactone 25mg daily    7. Mixed hyperlipidemia  Stable- continue current treatment and follow up routinely with PCP and cardiology ()  Crestor 5mg daily   Lab Results   Component Value Date    CHOL 139 07/24/2024    CHOL 146 07/12/2023    CHOL 138 06/01/2022     Lab Results   Component Value Date    HDL 53 07/24/2024    HDL 50 07/12/2023    HDL 53 06/01/2022     Lab Results   Component Value Date    LDLCALC 73.2 07/24/2024    LDLCALC 75.8 07/12/2023    LDLCALC 71.6 06/01/2022     Lab Results   Component Value Date    TRIG 64 07/24/2024    TRIG 101 07/12/2023    TRIG 67 06/01/2022       Lab Results   Component Value Date    CHOLHDL 38.1 07/24/2024    CHOLHDL 34.2 07/12/2023    CHOLHDL 38.4 06/01/2022        8. Abnormality of gait and mobility  Safety issues discussed and precautions reviewed    9. Paroxysmal atrial fibrillation  Stable- continue current treatment and follow up routinely with PCP and cardiology ()  Plavix 75mg daily, lasix 20mg daily, Toprol xl 25mg daily, xeralto 20mg daily    10. Ischemic dilated cardiomyopathy  Stable- continue current treatment and follow up routinely with PCP and cardiology ()  Plavix 75mg daily, lasix 20mg daily, Toprol xl 25mg daily, xeralto 20mg daily, crestor 5mg daily entresto 24-26mg bid, sotalol 80mg bid, aldactone 25mg daily    11. ICD (implantable cardioverter-defibrillator) in place  Stable- continue current treatment and follow up routinely with PCP and cardiology ()  Plavix 75mg daily,  lasix 20mg daily, Toprol xl 25mg daily, xeralto 20mg daily, crestor 5mg daily entresto 24-26mg bid, sotalol 80mg bid, aldactone 25mg daily    12. Primary hypertension  Stable- continue current treatment and follow up routinely with PCP and cardiology ()  lasix 20mg daily, Toprol xl 25mg daily, entresto 24-26mg bid, sotalol 80mg bid, aldactone 25mg daily  Vitals:    12/18/24 1100   BP: 122/74   Pulse: 75      13. Ventricular tachycardia  Stable- continue current treatment and follow up routinely with PCP and cardiology ()  Plavix 75mg daily, lasix 20mg daily, Toprol xl 25mg daily, xeralto 20mg daily, crestor 5mg daily entresto 24-26mg bid, sotalol 80mg bid, aldactone 25mg daily    Provided Sari with a 5-10 year written screening schedule and personal prevention plan. Recommendations were developed using the USPSTF age appropriate recommendations. Education, counseling, and referrals were provided as needed.  After Visit Summary printed and given to patient which includes a list of additional screenings\tests needed.    I offered to discuss advanced care planning, including how to pick a person who would make decisions for you if you were unable to make them for yourself, called a health care power of , and what kind of decisions you might make such as use of life sustaining treatments such as ventilators and tube feeding when faced with a life limiting illness recorded on a living will that they will need to know. (How you want to be cared for as you near the end of your natural life)   X Patient is interested in learning more about how to make advanced directives.  I provided them paperwork and offered to discuss this with them.  Daxa Parkinson, NP

## 2024-12-18 NOTE — PATIENT INSTRUCTIONS
Counseling and Referral of Other Preventative  (Italic type indicates deductible and co-insurance are waived)    Patient Name: Sari Biswas  Today's Date: 12/18/2024    Health Maintenance       Date Due Completion Date    Lipid Panel 07/24/2025 7/24/2024    DEXA Scan 06/17/2027 6/17/2024    TETANUS VACCINE 05/23/2033 5/23/2023        No orders of the defined types were placed in this encounter.    The following information is provided to all patients.  This information is to help you find resources for any of the problems found today that may be affecting your health:                  Living healthy guide: www.[facility].[place].[email or web address]      Understanding Diabetes: www.diabetes.org      Eating healthy: www.cdc.gov/healthyweight      CDC home safety checklist: www.cdc.gov/steadi/patient.html      Agency on Aging: www.goea.[place].[facility]      Alcoholics anonymous (AA): www.aa.org      Physical Activity: www.[place].nih.gov/us1rowb      Tobacco use: www.quitwithusla.org

## 2024-12-18 NOTE — PROGRESS NOTES
HPI    Pt here for 6 month follow up DLS- 06/21/24  Follow up on armd    Pt denies vision changes. Denies F/F.   BP is stable on meds.   Denies GTTS.  Last edited by Lamin Roberts, OD on 12/18/2024 11:05 AM.            Assessment /Plan     For exam results, see Encounter Report.    Early dry stage nonexudative age-related macular degeneration of both eyes    Pseudophakia of both eyes    Refractive error    Bilateral dry eyes      Stable acuity, stable dilated fundus exam, no signs of heme or edema, educated pt to monitor and compare eyes vision  2. Monitor condition. Patient to report any changes. RTC 1 year recheck.  3. New Spectacle Rx given, discussed different options for glasses. RTC 1 year routine eye exam.     4. Recommend artificial tears. 1 drop 2x per day. Chronicity of disease and treatment discussed.

## 2024-12-20 DIAGNOSIS — I25.10 CORONARY ARTERY DISEASE INVOLVING NATIVE CORONARY ARTERY OF NATIVE HEART WITHOUT ANGINA PECTORIS: ICD-10-CM

## 2024-12-20 DIAGNOSIS — E78.5 HYPERLIPIDEMIA, UNSPECIFIED HYPERLIPIDEMIA TYPE: ICD-10-CM

## 2024-12-20 DIAGNOSIS — I42.2 HYPERTROPHIC NONOBSTRUCTIVE CARDIOMYOPATHY: ICD-10-CM

## 2024-12-20 RX ORDER — SACUBITRIL AND VALSARTAN 24; 26 MG/1; MG/1
1 TABLET, FILM COATED ORAL 2 TIMES DAILY
Qty: 180 TABLET | Refills: 3 | Status: SHIPPED | OUTPATIENT
Start: 2024-12-20

## 2024-12-23 RX ORDER — SPIRONOLACTONE 25 MG/1
12.5 TABLET ORAL DAILY
Qty: 46 TABLET | Refills: 3 | Status: SHIPPED | OUTPATIENT
Start: 2024-12-23

## 2024-12-23 RX ORDER — ROSUVASTATIN CALCIUM 5 MG/1
5 TABLET, COATED ORAL NIGHTLY
Qty: 90 TABLET | Refills: 3 | Status: SHIPPED | OUTPATIENT
Start: 2024-12-23 | End: 2025-12-23

## 2024-12-23 RX ORDER — FUROSEMIDE 20 MG/1
20 TABLET ORAL DAILY
Qty: 90 TABLET | Refills: 3 | Status: SHIPPED | OUTPATIENT
Start: 2024-12-23

## 2025-02-02 ENCOUNTER — CLINICAL SUPPORT (OUTPATIENT)
Dept: CARDIOLOGY | Facility: HOSPITAL | Age: 85
End: 2025-02-02
Payer: MEDICARE

## 2025-02-02 ENCOUNTER — HOSPITAL ENCOUNTER (OUTPATIENT)
Dept: CARDIOLOGY | Facility: HOSPITAL | Age: 85
Discharge: HOME | End: 2025-02-02
Attending: INTERNAL MEDICINE
Payer: MEDICARE

## 2025-02-02 DIAGNOSIS — Z95.810 PRESENCE OF AUTOMATIC (IMPLANTABLE) CARDIAC DEFIBRILLATOR: ICD-10-CM

## 2025-02-02 PROCEDURE — 93296 REM INTERROG EVL PM/IDS: CPT | Mod: PO | Performed by: INTERNAL MEDICINE

## 2025-02-02 PROCEDURE — 93295 DEV INTERROG REMOTE 1/2/MLT: CPT | Mod: ,,, | Performed by: INTERNAL MEDICINE

## 2025-02-12 LAB
OHS CV AF BURDEN PERCENT: < 1
OHS CV DC REMOTE DEVICE TYPE: NORMAL
OHS CV ICD SHOCK: NO
OHS CV RV PACING PERCENT: 2 %

## 2025-04-30 ENCOUNTER — OFFICE VISIT (OUTPATIENT)
Dept: CARDIOLOGY | Facility: CLINIC | Age: 85
End: 2025-04-30
Payer: MEDICARE

## 2025-04-30 VITALS
HEART RATE: 80 BPM | HEIGHT: 65 IN | WEIGHT: 153.44 LBS | BODY MASS INDEX: 25.56 KG/M2 | DIASTOLIC BLOOD PRESSURE: 67 MMHG | SYSTOLIC BLOOD PRESSURE: 105 MMHG

## 2025-04-30 DIAGNOSIS — I50.42 CHRONIC COMBINED SYSTOLIC AND DIASTOLIC HEART FAILURE: ICD-10-CM

## 2025-04-30 DIAGNOSIS — I47.20 VENTRICULAR TACHYCARDIA: ICD-10-CM

## 2025-04-30 DIAGNOSIS — I25.9 CHRONIC ISCHEMIC HEART DISEASE, UNSPECIFIED: ICD-10-CM

## 2025-04-30 DIAGNOSIS — I51.7 CARDIOMEGALY: ICD-10-CM

## 2025-04-30 DIAGNOSIS — Z95.1 S/P CABG (CORONARY ARTERY BYPASS GRAFT): Primary | ICD-10-CM

## 2025-04-30 DIAGNOSIS — Z95.810 ICD (IMPLANTABLE CARDIOVERTER-DEFIBRILLATOR) IN PLACE: ICD-10-CM

## 2025-04-30 DIAGNOSIS — I21.A9 OTHER MYOCARDIAL INFARCTION TYPE: ICD-10-CM

## 2025-04-30 DIAGNOSIS — I25.811 ATHEROSCLEROSIS OF NATIVE CORONARY ARTERY OF TRANSPLANTED HEART WITHOUT ANGINA PECTORIS: ICD-10-CM

## 2025-04-30 DIAGNOSIS — I25.10 ATHEROSCLEROSIS OF NATIVE CORONARY ARTERY OF NATIVE HEART WITHOUT ANGINA PECTORIS: ICD-10-CM

## 2025-04-30 PROCEDURE — 1159F MED LIST DOCD IN RCRD: CPT | Mod: CPTII,S$GLB,,

## 2025-04-30 PROCEDURE — 99999 PR PBB SHADOW E&M-EST. PATIENT-LVL IV: CPT | Mod: PBBFAC,,,

## 2025-04-30 PROCEDURE — 3078F DIAST BP <80 MM HG: CPT | Mod: CPTII,S$GLB,,

## 2025-04-30 PROCEDURE — 1101F PT FALLS ASSESS-DOCD LE1/YR: CPT | Mod: CPTII,S$GLB,,

## 2025-04-30 PROCEDURE — 99214 OFFICE O/P EST MOD 30 MIN: CPT | Mod: S$GLB,,,

## 2025-04-30 PROCEDURE — 1126F AMNT PAIN NOTED NONE PRSNT: CPT | Mod: CPTII,S$GLB,,

## 2025-04-30 PROCEDURE — 3074F SYST BP LT 130 MM HG: CPT | Mod: CPTII,S$GLB,,

## 2025-04-30 PROCEDURE — 3288F FALL RISK ASSESSMENT DOCD: CPT | Mod: CPTII,S$GLB,,

## 2025-04-30 NOTE — PROGRESS NOTES
Subjective:    Patient ID:  Sari Biswas is a 84 y.o. female patient here for evaluation Follow-up (6 month)    History of Present Illness:     Patient of Dr. Singleton's here today for a follow up. Doing ok for the most part- some COMBS. Some edema in legs but ok with lasix most days. BP has been ok- always runs a bit low. NO chest pain or syncope. NO orthopnea PND.           Most Recent Echocardiogram Results  Results for orders placed in visit on 07/11/23    Echo    Interpretation Summary  · The left ventricle is normal in size with mild concentric hypertrophy and moderately decreased systolic function.  · The estimated ejection fraction is 35%.  · Grade I left ventricular diastolic dysfunction.  · Normal right ventricular size with normal right ventricular systolic function.  · Mild mitral regurgitation.  · Mild to moderate tricuspid regurgitation.  · Normal central venous pressure (3 mmHg).  · The estimated PA systolic pressure is 33 mmHg.      Most Recent Nuclear Stress Test Results  No results found for this or any previous visit.      Most Recent Cardiac PET Stress Test Results  No results found for this or any previous visit.      Most Recent Cardiovascular Angiogram results  Results for orders placed during the hospital encounter of 07/11/18    Cardiac catheterization    Narrative  · Female patient 78 years old who has multivessel coronary disease including distal left main with heavy calcification and the LAD diagonal bifurcation with heavy calcification. Symptomatic angina.  · Given option for surgical revascularization after diagnostic procedure. Lengthy discussion with patient and  in the office explaining her syntax score. Her syntax score was around 27. This shows more favorable outcome with surgical revascularization. Patient was not interested in surgical revascularization or getting a surgical opinion. She and ellenband declined surgical consult.  · Explained to her the complexity of  coronary intervention including the possible need for mechanical circulatory support. Also explained the risk of procedure including rotational atherectomy which would be necessary for complete revascularization.  · Again all of this is explained to the patient and her  in an office meeting that occurred about a week or so ago. We gave the patient the option of having this done early in the morning at another facility. However the patient's  was not able to drive to that facility to the procedure was scheduled at [facility] in the afternoon on today.  · Patient the catheterization lab in fasting state. Anesthesia sedated her for the procedure given the complexity but was necessary. We accessed the radial artery on the right and the femoral artery on the right. Ultrasound guidance was used for femoral artery access on the right. A 6 Malian sheath was placed in anticipation of possible impeller need  · He is in a 7 Malian system in the right radial artery after 80 units per kilogram heparin was given 2000 which through the radial cocktail a CLS 3.5 guiding catheter was used to engage left main  · Angiography demonstrates distal left main disease which is confirmed by intravascular ultrasound and angiographic assessment done about 2 weeks ago. Left main into LAD not involving the ostial circumflex. Again that was confirmed by intravascular ultrasound  · There was an LAD diagonal bifurcation lesion have a calcified. More calcification in the LAD than in the diagonal  · Using an over-the-wire system with advanced a floppy Rotablator wire into the distal LAD. We used a soft wire and then an over-the-wire balloon to exchanged out for the floppy rotational atherectomy wire. The balloon was withdrawn. We did rotational atherectomy of the mid LAD and the distal left main into the proximal LAD. 2 separate runs for about 30 seconds at a 6 cyclic speed of 160,000revoutions.  · No hemodynamic embarrassment  at this point. Blood pressure remained stable. ACT came back well above 250 seconds.  · We did give one more thousand units of heparin during the procedure when the ACT came back at around 210-220. I did we would be doing the intervention for a little while longer 70 to the ACT a little higher.  · Once rotational atherectomy was done we advanced 2 separate wires into the LAD and diagonal respectively. A  50 the diagonal and a whisper extra-support the LAD.  · Our intention was to do a crush technique. We we advanced a 3.5 x 38 stent into the LAD but it was not able to go far enough. Furthermore once LAD stent was in position we were not able to get her diagonal stent in.  · Therefore we withdrew the LAD stents leaving our wire in the LAD and diagonal. We did balloon angioplasty of the ostial diagonal with a 3.5 and 4.0 noncompliant balloons with good result.  · We then tried advancing a 0.75 Synergy stent to the diagonal but would not go past the proximal or ostial segment. Therefore we withdrew the stent from the diagonal and advance a stent to the LAD. The intention at this point was because the ostium of the diagonal looked acceptable we would stented the LAD and recross into the diagonal.  · Once he stented the LAD with a 3.5 x 30 8I noticed that her ST segments were elevated. At this point I expected the diagonal would be having slow flow.  · Angiographically there was no flow past the mid segment of the diagonal. I rapidly rewired the vessel.  · At this point when angiographic assessment was done there was a perforation in the LAD. I knew that she was going to have pericardial fluid and tamponade  · At this point we rapidly did a pericardial access with fluoroscopic guidance. I was able to negotiate a catheter into the pericardial space and withdrew about 700 mL of blood.  · We autotransfuser split into the patient.  · Anesthesia was present and I advised them to intubate her and manage her hemodynamics  which they did. Pressors were started including epinephrine and levo fed as well as Luís-Synephrine.  · I then placed iIMPELLA catheter via right femoral artery access. This was an IMPELLA CP  · We then continued aspirating blood off the pericardial space. At this point we did multiple balloon inflations for 6-10 minutes in the proximal LAD to try to help to close off the perforation. This however was not successful. Therefore placed a 2.5 covered stent in the distal LAD. She had persistent bleeding but ultimately the bleeding did stop.  · I needed to guide liner to deliver the covered stent.  · With the covered stent in place she had GABRIELE 2 flow into the distal LAD at the end of the procedure with what appeared to be thrombus in the LAD. However at this point we had held off on giving any further heparin and I did start her initially on IIb IIIa inhibitor once the slow flow in the diagonal was noted but it was stopped immediately once the perforation was noted.  · Surgery had been called and were available and in the room at the time the decision was made to take her to the operating room.\  · She was hemodynamically stable at this time with a good rhythm. Blood pressure was stable. She was on pressors but systolic pressure was well above 100.  · We had to do chest compressions for about 1 minutes. She also had to be defibrillated once. However her airway was accessed immediately and the pericardial effusion was drained immediately.  · She went to the OR in critical condition however.  · I explained to the patient's family the occurrences. Explained this is unfortunately a given risk of this complex type of intervention but that everything was done in an expedient fashion to try to restore circulation and try to manage the perforation.  · We are hopeful that she will make a meaningful recovery.  · It is possible that her LV function will be affected by these events.    I certify that I was present for catheter  insertion, catheter manipulation, angiography, and angiographic interpretation of this patient.      Procedure Log documented by Documenter: Liz Ward RN and verified by Kumar Colon.    Date: 7/11/2018  Time: 6:27 PM      Other Most Recent Cardiology Results  Results for orders placed in visit on 02/02/25    Cardiac device check - Remote alert      REVIEW OF SYSTEMS: As noted in HPI   CARDIOVASCULAR: No recent chest pain, palpitations, arm/neck/jaw pain, or edema.  RESPIRATORY: No recent fever, cough, SOB.  : No blood in the urine  GI: No reflux, nausea, vomiting, or blood in stool.   MUSCULOSKELETAL: No falls.   NEURO: No headaches, syncope, or dizziness.  EYES: No sudden changes in vision.     Past Medical History:   Diagnosis Date    Anticoagulant long-term use     Arthritis     Cancer     CHF (congestive heart failure)     COPD (chronic obstructive pulmonary disease)     Coronary artery disease     Coronary artery disease involving native coronary artery of native heart 07/21/2018    Defibrillator discharge     Depression     DVT (deep venous thrombosis) 2012    left calf    Encounter for blood transfusion     GERD (gastroesophageal reflux disease)     Hematoma     several    History of recurrent UTIs     Hypertension     ICD (implantable cardioverter-defibrillator) in place     Non-recurrent unilateral inguinal hernia without obstruction or gangrene 06/2021    Pacemaker     Paroxysmal atrial fibrillation     Skin cancer     SVT (supraventricular tachycardia)      Past Surgical History:   Procedure Laterality Date    ABLATION N/A 10/26/2018    Procedure: Ablation;  Surgeon: Bonilla Fitzgerald MD;  Location: [facility] CATH LAB;  Service: Cardiology;  Laterality: N/A;  AFL, LOUANN, RFA, PAVAN, MAC, SK ,3prep *Anticipate left femoral approach*    APPENDECTOMY      ATHERECTOMY N/A 7/11/2018    Procedure: Atherectomy;  Surgeon: Kumar Colon MD;  Location: [facility] CATH;  Service: Cardiology;  Laterality: N/A;    BREAST  BIOPSY Left     over 10 yrs. ago    CARDIAC CATHETERIZATION      CARDIAC SURGERY      angiogram    CATARACT EXTRACTION W/  INTRAOCULAR LENS IMPLANT      CHOLECYSTECTOMY      CORONARY ANGIOGRAPHY N/A 6/19/2018    Procedure: ANGIOGRAM-CORONARY;  Surgeon: Kumar Colon MD;  Location: [facility] CATH;  Service: Cardiology;  Laterality: N/A;    CORONARY ARTERY BYPASS GRAFT      CORONARY ARTERY BYPASS GRAFT (CABG) N/A 7/11/2018    Procedure: CREATION, BYPASS, ARTERIAL, AORTA TO CORONARY, USING GRAFT  [facility];  Surgeon: Aurora Hunter MD;  Location: [facility] OR;  Service: Cardiovascular;  Laterality: N/A;  emergency    CORONARY STENT PLACEMENT N/A 7/11/2018    Procedure: Stent DIXIE coronary;  Surgeon: Kumar Colon MD;  Location: [facility] CATH;  Service: Cardiology;  Laterality: N/A;    EYE SURGERY      cataract    HYSTERECTOMY      INSERTION OF INTRAVASCULAR MICROAXIAL BLOOD PUMP N/A 7/11/2018    Procedure: INSERTION, IMPELLA;  Surgeon: Kumar Colon MD;  Location: [facility] CATH;  Service: Cardiology;  Laterality: N/A;    INSERTION OF TEMPORARY PACEMAKER N/A 7/11/2018    Procedure: INSERTION, PACEMAKER, TEMPORARY;  Surgeon: Kumar Colon MD;  Location: [facility] CATH;  Service: Cardiology;  Laterality: N/A;    KNEE ARTHROSCOPY W/ DEBRIDEMENT  2012    LEFT HEART CATHETERIZATION Left 6/19/2018    Procedure: Left heart cath;  Surgeon: Kumar Colon MD;  Location: [facility] CATH;  Service: Cardiology;  Laterality: Left;    LEFT HEART CATHETERIZATION Left 7/11/2018    Procedure: Left heart cath;  Surgeon: Kumar Colon MD;  Location: [facility] CATH;  Service: Cardiology;  Laterality: Left;    OOPHORECTOMY      PERICARDIOCENTESIS N/A 7/11/2018    Procedure: Pericardiocentesis;  Surgeon: Kumar Colon MD;  Location: [facility] CATH;  Service: Cardiology;  Laterality: N/A;    ROBOT-ASSISTED LAPAROSCOPIC REPAIR OF INGUINAL HERNIA USING DA YESSICA XI Right 6/28/2021    Procedure: XI ROBOTIC REPAIR, HERNIA, INGUINAL;  Surgeon: Keshav Alonzo MD;   Location: [facility] OR;  Service: General;  Laterality: Right;    TRANSESOPHAGEAL ECHOCARDIOGRAPHY N/A 10/26/2018    Procedure: ECHOCARDIOGRAM, TRANSESOPHAGEAL;  Surgeon: Bonilla Fitzgerald MD;  Location: [facility] CATH LAB;  Service: Cardiology;  Laterality: N/A;    TREATMENT OF CARDIAC ARRHYTHMIA N/A 10/8/2018    Procedure: Cardioversion/Defibrillation;  Surgeon: Rodo Singleton MD;  Location: [facility];  Service: Cardiology;  Laterality: N/A;     Social History[1]      Objective      Vitals:    04/30/25 1335   BP: 105/67   Pulse: 80       LAST EKG  Results for orders placed or performed in visit on 07/15/22   IN OFFICE EKG 12-LEAD (to [place])    Collection Time: 07/15/22  3:12 PM    Narrative    Test Reason : z00.00    Vent. Rate : 074 BPM     Atrial Rate : 074 BPM     P-R Int : 148 ms          QRS Dur : 080 ms      QT Int : 392 ms       P-R-T Axes : 000 -31 098 degrees     QTc Int : 435 ms    Atrial-paced rhythm  Left axis deviation  Nonspecific ST and/or T wave abnormalities  Abnormal ECG  When compared with ECG of 25-JUN-2022 10:48,  No significant change was found  Confirmed by Agustin Gagnon MD ([id number]) on 7/16/2022 2:06:44 PM    Referred By: CONSTANTINO HUGO           Confirmed By:Agustin Gagnon MD     LIPIDS - LAST 2   Lab Results   Component Value Date    CHOL 139 07/24/2024    CHOL 146 07/12/2023    HDL 53 07/24/2024    HDL 50 07/12/2023    LDLCALC 73.2 07/24/2024    LDLCALC 75.8 07/12/2023    TRIG 64 07/24/2024    TRIG 101 07/12/2023    CHOLHDL 38.1 07/24/2024    CHOLHDL 34.2 07/12/2023     CARDIAC PROFILE - LAST 2  Lab Results   Component Value Date     (H) 07/12/2023     (H) 06/01/2022    TROPONINI 0.296 (HH) 07/27/2018    TROPONINI <0.012 11/13/2017      CBC - LAST 2  Lab Results   Component Value Date    WBC 4.92 07/24/2024    WBC 4.72 07/12/2023    HGB 13.6 07/24/2024    HGB 13.3 07/12/2023    HCT 42.1 07/24/2024    HCT 41.0 07/12/2023     07/24/2024     07/12/2023     Lab Results    Component Value Date    LABPT 15.4 (H) 01/10/2019    LABPT 16.0 (H) 07/27/2018    INR 1.3 01/10/2019    INR 1.3 (H) 10/26/2018    APTT 31.0 01/10/2019    APTT 26.5 10/26/2018     CHEMISTRY - LAST 2  Lab Results   Component Value Date     07/24/2024     07/12/2023    K 4.8 07/24/2024    K 4.6 07/12/2023    CO2 28 07/24/2024    CO2 26 07/12/2023    BUN 27 (H) 07/24/2024    BUN 28 (H) 07/12/2023    CREATININE 1.2 07/24/2024    CREATININE 1.1 07/12/2023    GLU 88 07/24/2024    GLU 92 07/12/2023    CALCIUM 9.4 07/24/2024    CALCIUM 9.0 07/12/2023    PH 7.49 (H) 07/13/2018    PH 7.48 (H) 07/12/2018    MG 2.3 06/25/2022    MG 2.3 06/24/2022    ALBUMIN 3.7 07/24/2024    ALBUMIN 3.6 07/12/2023    ALT 13 07/24/2024    ALT 12 07/12/2023    AST 18 07/24/2024    AST 19 07/12/2023      ENDOCRINE - LAST 2  Lab Results   Component Value Date    TSH 2.164 07/24/2024    TSH 2.233 06/01/2022        PHYSICAL EXAM  CONSTITUTIONAL: Well built, well nourished in no apparent distress  NECK: no carotid bruit, no JVD  LUNGS: CTA  CHEST WALL: no tenderness  HEART: regular rate and rhythm, S1, S2 normal, no murmur, click, rub or gallop   ABDOMEN: soft, non-tender; bowel sounds normal; no masses,  no organomegaly  EXTREMITIES: Lymphedema present, but no pitting edema   NEURO: AAO X 3    I HAVE REVIEWED :    The vital signs, most recent cardiac testing, and most recent pertinent non-cardiology provider notes.    Current Outpatient Medications   Medication Instructions    BIOTIN ORAL 1 capsule, Daily    calcium-vitamin D3 (OS-MILO 500 + D3) 500 mg-5 mcg (200 unit) per tablet 1 tablet, 2 times daily with meals    carboxymethylcellulose (REFRESH PLUS) 0.5 % Dpet 1 drop, 3 times daily PRN    clobetasoL (CLOBEX) 0.05 % shampoo Wash scalp 2 to 3 times a week as needed for rash and itching as scalp.    clobetasoL (CLOBEX) 0.05 % shampoo Wash scalp once weekly. Avoid use of medication on face, body folds, groin/genitalia.    clopidogreL  (PLAVIX) 75 mg, Oral, Daily    cranberry 400 mg Cap 1 capsule, Daily    fluocinonide (LIDEX) 0.05 % external solution Topical (Top), 2 times daily    fluocinonide (LIDEX) 0.05 % external solution Topical (Top), 2 times daily PRN, Avoid use of medication on face, body folds, groin/genitalia.    furosemide (LASIX) 20 mg, Oral, Daily    hydrocortisone 2.5 % cream     hydrocortisone 2.5 % cream Topical (Top), 2 times daily PRN, Mix 50/50 with ketoconazole cream.    ketoconazole (NIZORAL) 2 % cream Topical (Top), 2 times daily, Mix 50/50 with hydrocortisone cream twice daily as needed for flares.    ketoconazole (NIZORAL) 2 % shampoo Topical (Top), Twice weekly    metoprolol succinate (TOPROL-XL) 25 mg, Oral, Daily    pantoprazole (PROTONIX) 40 mg, Oral, Daily    rosuvastatin (CRESTOR) 5 mg, Oral, Nightly    sacubitriL-valsartan (ENTRESTO) 24-26 mg per tablet 1 tablet, Oral, 2 times daily    sotaloL (BETAPACE) 80 mg, Oral, 2 times daily    spironolactone (ALDACTONE) 12.5 mg, Oral, Daily    vit C,N-Zn-vsstw-lutein-zeaxan (PRESERVISION AREDS 2) 250-90-40-1 mg Cap 2 times daily    vitamin D (VITAMIN D3) 1,000 Units, Daily    XARELTO 20 mg, Oral, With dinner        Assessment & Plan   1. S/P CABG (coronary artery bypass graft) (Primary)  - Echo; Future  - Lipid Panel; Future  - TSH; Future  - Comprehensive Metabolic Panel; Future  - CBC Auto Differential; Future  - Hemoglobin A1C; Future  - CV Ultrasound Bilateral Doppler Carotid; Future    2. Chronic combined systolic and diastolic heart failure  Repeat echo to reassess EF, valves   Continue entresto   Continue sotalol   Continue lasix 20 mg daily     3. Ventricular tachycardia  4. ICD (implantable cardioverter-defibrillator) in place  Recent device check with no VT or shocks/ATP     5. Atherosclerosis of native coronary artery of transplanted heart without angina pectoris  - Lipid Panel; Future           6 mo Dr. Orejarena Naomi Peters, PA-C [facility]  Card[facility]   Office: [phone]         [1]   Social History  Tobacco Use    Smoking status: Former     Current packs/day: 0.00     Average packs/day: 1 pack/day for 40.0 years (40.0 ttl pk-yrs)     Types: Cigarettes     Start date:      Quit date:      Years since quittin.3    Smokeless tobacco: Never    Tobacco comments:     quit smoking in .   Substance Use Topics    Alcohol use: Yes     Comment: seldom    Drug use: No

## 2025-05-04 ENCOUNTER — CLINICAL SUPPORT (OUTPATIENT)
Dept: CARDIOLOGY | Facility: HOSPITAL | Age: 85
End: 2025-05-04
Payer: MEDICARE

## 2025-05-04 ENCOUNTER — HOSPITAL ENCOUNTER (OUTPATIENT)
Dept: CARDIOLOGY | Facility: HOSPITAL | Age: 85
Discharge: HOME | End: 2025-05-04
Attending: INTERNAL MEDICINE
Payer: MEDICARE

## 2025-05-04 DIAGNOSIS — Z95.810 PRESENCE OF AUTOMATIC (IMPLANTABLE) CARDIAC DEFIBRILLATOR: ICD-10-CM

## 2025-05-04 PROCEDURE — 93295 DEV INTERROG REMOTE 1/2/MLT: CPT | Mod: ,,, | Performed by: INTERNAL MEDICINE

## 2025-05-04 PROCEDURE — 93296 REM INTERROG EVL PM/IDS: CPT | Mod: PO | Performed by: INTERNAL MEDICINE

## 2025-05-12 LAB
OHS CV AF BURDEN PERCENT: < 1
OHS CV DC REMOTE DEVICE TYPE: NORMAL
OHS CV RV PACING PERCENT: 1 %

## 2025-05-14 ENCOUNTER — HOSPITAL ENCOUNTER (OUTPATIENT)
Dept: CARDIOLOGY | Facility: HOSPITAL | Age: 85
Discharge: HOME | End: 2025-05-14
Payer: MEDICARE

## 2025-05-14 VITALS — WEIGHT: 153 LBS | HEIGHT: 65 IN | BODY MASS INDEX: 25.49 KG/M2

## 2025-05-14 DIAGNOSIS — I50.42 CHRONIC COMBINED SYSTOLIC AND DIASTOLIC HEART FAILURE: ICD-10-CM

## 2025-05-14 DIAGNOSIS — Z95.1 S/P CABG (CORONARY ARTERY BYPASS GRAFT): ICD-10-CM

## 2025-05-14 DIAGNOSIS — I25.10 ATHEROSCLEROSIS OF NATIVE CORONARY ARTERY OF NATIVE HEART WITHOUT ANGINA PECTORIS: ICD-10-CM

## 2025-05-14 LAB
AORTIC SIZE INDEX (SOV): 1.8 CM/M2
AORTIC SIZE INDEX: 1.6 CM/M2
ASCENDING AORTA: 2.9 CM
AV INDEX (PROSTH): 0.62
AV MEAN GRADIENT: 4 MMHG
AV PEAK GRADIENT: 7 MMHG
AV REGURGITATION PRESSURE HALF TIME: 414 MS
AV VALVE AREA BY VELOCITY RATIO: 2.1 CM²
AV VALVE AREA: 2.2 CM²
AV VELOCITY RATIO: 0.62
BSA FOR ECHO PROCEDURE: 1.78 M2
DOP CALC AO PEAK VEL: 1.3 M/S
DOP CALC AO VTI: 28.4 CM
DOP CALC LVOT AREA: 3.5 CM2
DOP CALC LVOT DIAMETER: 2.1 CM
DOP CALC LVOT PEAK VEL: 0.8 M/S
DOP CALC LVOT STROKE VOLUME: 61.3 CM3
DOP CALCLVOT PEAK VEL VTI: 17.7 CM
E WAVE DECELERATION TIME: 234 MSEC
E/A RATIO: 0.98
E/E' RATIO: 13 M/S
FRACTIONAL SHORTENING: 16.1 % (ref 28–44)
INTERVENTRICULAR SEPTUM: 1.2 CM (ref 0.6–1.1)
IVC DIAMETER: 1.88 CM
LEFT ARM DIASTOLIC BLOOD PRESSURE: 67 MMHG
LEFT ARM SYSTOLIC BLOOD PRESSURE: 105 MMHG
LEFT ATRIUM AREA SYSTOLIC (APICAL 2 CHAMBER): 21.78 CM2
LEFT ATRIUM AREA SYSTOLIC (APICAL 4 CHAMBER): 22.56 CM2
LEFT ATRIUM SIZE: 4.4 CM
LEFT ATRIUM VOLUME INDEX MOD: 42 ML/M2
LEFT ATRIUM VOLUME MOD: 75 ML
LEFT CBA DIAS: 16 CM/S
LEFT CBA SYS: 66 CM/S
LEFT CCA DIST DIAS: 22 CM/S
LEFT CCA DIST SYS: 72 CM/S
LEFT CCA MID DIAS: 20 CM/S
LEFT CCA MID SYS: 75 CM/S
LEFT CCA PROX DIAS: 15 CM/S
LEFT CCA PROX SYS: 59 CM/S
LEFT ECA DIAS: 8 CM/S
LEFT ECA SYS: 82 CM/S
LEFT ICA DIST DIAS: 21 CM/S
LEFT ICA DIST SYS: 73 CM/S
LEFT ICA MID DIAS: 20 CM/S
LEFT ICA MID SYS: 69 CM/S
LEFT ICA PROX DIAS: 10 CM/S
LEFT ICA PROX SYS: 34 CM/S
LEFT INTERNAL DIMENSION IN SYSTOLE: 4.7 CM (ref 2.1–4)
LEFT VENTRICLE DIASTOLIC VOLUME INDEX: 85.31 ML/M2
LEFT VENTRICLE DIASTOLIC VOLUME: 151 ML
LEFT VENTRICLE END SYSTOLIC VOLUME APICAL 2 CHAMBER: 71.79 ML
LEFT VENTRICLE END SYSTOLIC VOLUME APICAL 4 CHAMBER: 70.89 ML
LEFT VENTRICLE SYSTOLIC VOLUME INDEX: 57.6 ML/M2
LEFT VENTRICLE SYSTOLIC VOLUME: 102 ML
LEFT VENTRICULAR INTERNAL DIMENSION IN DIASTOLE: 5.6 CM (ref 3.5–6)
LEFT VERTEBRAL DIAS: 14 CM/S
LEFT VERTEBRAL SYS: 60 CM/S
LV LATERAL E/E' RATIO: 10.3 M/S
LV SEPTAL E/E' RATIO: 16.4 M/S
LVED V (TEICH): 151.32 ML
LVES V (TEICH): 101.89 ML
LVOT MG: 1.4 MMHG
LVOT MV: 0.55 CM/S
MV PEAK A VEL: 0.84 M/S
MV PEAK E VEL: 0.82 M/S
OHS CV CAROTID RIGHT ICA EDV HIGHEST: 22
OHS CV CAROTID ULTRASOUND LEFT ICA/CCA RATIO: 1.01
OHS CV CAROTID ULTRASOUND RIGHT ICA/CCA RATIO: 1.06
OHS CV PV CAROTID LEFT HIGHEST CCA: 75
OHS CV PV CAROTID LEFT HIGHEST ICA: 73
OHS CV PV CAROTID RIGHT HIGHEST CCA: 101
OHS CV PV CAROTID RIGHT HIGHEST ICA: 71
OHS CV RV/LV RATIO: 0.82 CM
OHS CV US CAROTID LEFT HIGHEST EDV: 21
PISA AR MAX VEL: 2.94 M/S
PISA TR MAX VEL: 3.1 M/S
RA VOL SYS: 74.64 ML
RIGHT ARM DIASTOLIC BLOOD PRESSURE: 67 MMHG
RIGHT ARM SYSTOLIC BLOOD PRESSURE: 105 MMHG
RIGHT ATRIAL AREA: 22.8 CM2
RIGHT ATRIUM VOLUME AREA LENGTH APICAL 4 CHAMBER: 70.25 ML
RIGHT CBA DIAS: 14 CM/S
RIGHT CBA SYS: 67 CM/S
RIGHT CCA DIST DIAS: 16 CM/S
RIGHT CCA DIST SYS: 67 CM/S
RIGHT CCA MID DIAS: 16 CM/S
RIGHT CCA MID SYS: 89 CM/S
RIGHT CCA PROX DIAS: 14 CM/S
RIGHT CCA PROX SYS: 101 CM/S
RIGHT ECA DIAS: 10 CM/S
RIGHT ECA SYS: 84 CM/S
RIGHT ICA DIST DIAS: 22 CM/S
RIGHT ICA DIST SYS: 71 CM/S
RIGHT ICA MID DIAS: 18 CM/S
RIGHT ICA MID SYS: 52 CM/S
RIGHT ICA PROX DIAS: 13 CM/S
RIGHT ICA PROX SYS: 52 CM/S
RIGHT VENTRICLE DIASTOLIC BASEL DIMENSION: 4.6 CM
RIGHT VENTRICLE DIASTOLIC LENGTH: 7.7 CM
RIGHT VENTRICLE DIASTOLIC MID DIMENSION: 2.6 CM
RIGHT VENTRICULAR END-DIASTOLIC DIMENSION: 4.58 CM
RIGHT VENTRICULAR LENGTH IN DIASTOLE (APICAL 4-CHAMBER VIEW): 7.65 CM
RIGHT VERTEBRAL DIAS: 11 CM/S
RIGHT VERTEBRAL SYS: 44 CM/S
RV MID DIAMA: 2.55 CM
RV TISSUE DOPPLER FREE WALL SYSTOLIC VELOCITY 1 (APICAL 4 CHAMBER VIEW): 11.04 CM/S
SINUS: 3.18 CM
STJ: 2.7 CM
TDI LATERAL: 0.08 M/S
TDI SEPTAL: 0.05 M/S
TDI: 0.07 M/S
TR MAX PG: 39 MMHG
TRICUSPID ANNULAR PLANE SYSTOLIC EXCURSION: 2.1 CM
Z-SCORE OF LEFT VENTRICULAR DIMENSION IN END DIASTOLE: 1.35
Z-SCORE OF LEFT VENTRICULAR DIMENSION IN END SYSTOLE: 3.48

## 2025-05-14 PROCEDURE — 93306 TTE W/DOPPLER COMPLETE: CPT | Mod: PO

## 2025-05-14 PROCEDURE — 93880 EXTRACRANIAL BILAT STUDY: CPT | Mod: 26,,, | Performed by: INTERNAL MEDICINE

## 2025-05-14 PROCEDURE — 93880 EXTRACRANIAL BILAT STUDY: CPT | Mod: PO

## 2025-05-14 PROCEDURE — 25500020 PHARM REV CODE 255: Mod: PO

## 2025-05-14 RX ADMIN — HUMAN ALBUMIN MICROSPHERES AND PERFLUTREN 0.11 MG: 10; .22 INJECTION, SOLUTION INTRAVENOUS at 04:05

## 2025-05-15 ENCOUNTER — RESULTS FOLLOW-UP (OUTPATIENT)
Dept: CARDIOLOGY | Facility: CLINIC | Age: 85
End: 2025-05-15

## 2025-06-03 ENCOUNTER — OFFICE VISIT (OUTPATIENT)
Dept: OTOLARYNGOLOGY | Facility: CLINIC | Age: 85
End: 2025-06-03
Payer: MEDICARE

## 2025-06-03 VITALS — HEIGHT: 65 IN | WEIGHT: 153 LBS | BODY MASS INDEX: 25.49 KG/M2

## 2025-06-03 DIAGNOSIS — Z79.01 HX OF LONG TERM USE OF BLOOD THINNERS: ICD-10-CM

## 2025-06-03 DIAGNOSIS — J31.0 CHRONIC RHINITIS: ICD-10-CM

## 2025-06-03 DIAGNOSIS — J34.89 LESION OF NASAL CAVITY: ICD-10-CM

## 2025-06-03 DIAGNOSIS — I48.0 PAROXYSMAL ATRIAL FIBRILLATION: ICD-10-CM

## 2025-06-03 DIAGNOSIS — R04.0 EPISTAXIS: Primary | ICD-10-CM

## 2025-06-03 DIAGNOSIS — R09.82 PND (POST-NASAL DRIP): ICD-10-CM

## 2025-06-03 PROCEDURE — 1159F MED LIST DOCD IN RCRD: CPT | Mod: CPTII,S$GLB,, | Performed by: NURSE PRACTITIONER

## 2025-06-03 PROCEDURE — 3288F FALL RISK ASSESSMENT DOCD: CPT | Mod: CPTII,S$GLB,, | Performed by: NURSE PRACTITIONER

## 2025-06-03 PROCEDURE — 1101F PT FALLS ASSESS-DOCD LE1/YR: CPT | Mod: CPTII,S$GLB,, | Performed by: NURSE PRACTITIONER

## 2025-06-03 PROCEDURE — 1126F AMNT PAIN NOTED NONE PRSNT: CPT | Mod: CPTII,S$GLB,, | Performed by: NURSE PRACTITIONER

## 2025-06-03 PROCEDURE — 99203 OFFICE O/P NEW LOW 30 MIN: CPT | Mod: S$GLB,,, | Performed by: NURSE PRACTITIONER

## 2025-06-03 PROCEDURE — 99999 PR PBB SHADOW E&M-EST. PATIENT-LVL IV: CPT | Mod: PBBFAC,,, | Performed by: NURSE PRACTITIONER

## 2025-06-03 RX ORDER — MUPIROCIN 20 MG/G
OINTMENT TOPICAL
Qty: 22 G | Refills: 1 | Status: SHIPPED | OUTPATIENT
Start: 2025-06-03

## 2025-06-10 NOTE — PROGRESS NOTES
Patient here today to discuss recurrent UTI over last year or so.   Known rectocele, minimal anterior prolapse.   Counseled, would recommend urology eval prior to planned rectocele repair. Agreed  Reports DVT after outpatient knee arthroscopy - counseled.       PLAN:  Continue antibiotics  Referral to urology for eval, recurrent UTI  Follow up week after urology to consent for SIMPLE rectocele repair / possible coordinat with urology for cysto if needed  Need postop lovenox x 6 weeks.     20 minutes spent with patient with > 1/2 time in counseling.       no

## 2025-06-30 DIAGNOSIS — M25.561 ACUTE PAIN OF RIGHT KNEE: Primary | ICD-10-CM

## 2025-07-08 ENCOUNTER — OFFICE VISIT (OUTPATIENT)
Dept: ORTHOPEDICS | Facility: CLINIC | Age: 85
End: 2025-07-08
Payer: MEDICARE

## 2025-07-08 ENCOUNTER — HOSPITAL ENCOUNTER (OUTPATIENT)
Dept: RADIOLOGY | Facility: HOSPITAL | Age: 85
Discharge: HOME | End: 2025-07-08
Attending: ORTHOPAEDIC SURGERY
Payer: MEDICARE

## 2025-07-08 DIAGNOSIS — M17.11 PRIMARY OSTEOARTHRITIS OF RIGHT KNEE: ICD-10-CM

## 2025-07-08 DIAGNOSIS — M25.561 ACUTE PAIN OF RIGHT KNEE: Primary | ICD-10-CM

## 2025-07-08 DIAGNOSIS — M25.561 ACUTE PAIN OF RIGHT KNEE: ICD-10-CM

## 2025-07-08 PROCEDURE — 73560 X-RAY EXAM OF KNEE 1 OR 2: CPT | Mod: 26,LT,, | Performed by: RADIOLOGY

## 2025-07-08 PROCEDURE — 1125F AMNT PAIN NOTED PAIN PRSNT: CPT | Mod: CPTII,S$GLB,, | Performed by: ORTHOPAEDIC SURGERY

## 2025-07-08 PROCEDURE — 99204 OFFICE O/P NEW MOD 45 MIN: CPT | Mod: 25,S$GLB,, | Performed by: ORTHOPAEDIC SURGERY

## 2025-07-08 PROCEDURE — 1160F RVW MEDS BY RX/DR IN RCRD: CPT | Mod: CPTII,S$GLB,, | Performed by: ORTHOPAEDIC SURGERY

## 2025-07-08 PROCEDURE — 20610 DRAIN/INJ JOINT/BURSA W/O US: CPT | Mod: RT,S$GLB,, | Performed by: ORTHOPAEDIC SURGERY

## 2025-07-08 PROCEDURE — 1159F MED LIST DOCD IN RCRD: CPT | Mod: CPTII,S$GLB,, | Performed by: ORTHOPAEDIC SURGERY

## 2025-07-08 PROCEDURE — 73562 X-RAY EXAM OF KNEE 3: CPT | Mod: 26,RT,, | Performed by: RADIOLOGY

## 2025-07-08 PROCEDURE — 73560 X-RAY EXAM OF KNEE 1 OR 2: CPT | Mod: TC,PO,LT

## 2025-07-08 PROCEDURE — 99999 PR PBB SHADOW E&M-EST. PATIENT-LVL III: CPT | Mod: PBBFAC,,, | Performed by: ORTHOPAEDIC SURGERY

## 2025-07-08 RX ADMIN — TRIAMCINOLONE ACETONIDE 40 MG: 40 INJECTION, SUSPENSION INTRA-ARTICULAR; INTRAMUSCULAR at 01:07

## 2025-07-17 RX ORDER — TRIAMCINOLONE ACETONIDE 40 MG/ML
40 INJECTION, SUSPENSION INTRA-ARTICULAR; INTRAMUSCULAR
Status: DISCONTINUED | OUTPATIENT
Start: 2025-07-08 | End: 2025-07-17 | Stop reason: HOSPADM

## 2025-07-17 NOTE — PROGRESS NOTES
Chief Complaint   Patient presents with    Right Knee - Pain         HPI:   This is a 85 y.o. who presents to clinic today complaining of right knee pain for 1 years after no known trauma. Pain is progressively worsening. No numbness or tingling. No associated signs or symptoms.    Past Medical History:   Diagnosis Date    Anticoagulant long-term use     Arthritis     Cancer     CHF (congestive heart failure)     COPD (chronic obstructive pulmonary disease)     Coronary artery disease     Coronary artery disease involving native coronary artery of native heart 07/21/2018    Defibrillator discharge     Depression     DVT (deep venous thrombosis) 2012    left calf    Encounter for blood transfusion     GERD (gastroesophageal reflux disease)     Hematoma     several    History of recurrent UTIs     Hypertension     ICD (implantable cardioverter-defibrillator) in place     Non-recurrent unilateral inguinal hernia without obstruction or gangrene 06/2021    Pacemaker     Paroxysmal atrial fibrillation     Skin cancer     SVT (supraventricular tachycardia)      Past Surgical History:   Procedure Laterality Date    ABLATION N/A 10/26/2018    Procedure: Ablation;  Surgeon: Bonilla Fitzgerald MD;  Location: [facility] CATH LAB;  Service: Cardiology;  Laterality: N/A;  AFL, LOUANN, RFA, PAVAN, MAC, SK ,3prep *Anticipate left femoral approach*    APPENDECTOMY      ATHERECTOMY N/A 7/11/2018    Procedure: Atherectomy;  Surgeon: Kumar Colon MD;  Location: [facility] CATH;  Service: Cardiology;  Laterality: N/A;    BREAST BIOPSY Left     over 10 yrs. ago    CARDIAC CATHETERIZATION      CARDIAC SURGERY      angiogram    CATARACT EXTRACTION W/  INTRAOCULAR LENS IMPLANT      CHOLECYSTECTOMY      CORONARY ANGIOGRAPHY N/A 6/19/2018    Procedure: ANGIOGRAM-CORONARY;  Surgeon: Kumar Colon MD;  Location: [facility] CATH;  Service: Cardiology;  Laterality: N/A;    CORONARY ARTERY BYPASS GRAFT      CORONARY ARTERY BYPASS GRAFT (CABG) N/A 7/11/2018     Procedure: CREATION, BYPASS, ARTERIAL, AORTA TO CORONARY, USING GRAFT  EVSH;  Surgeon: Aurora Hunter MD;  Location: [facility] OR;  Service: Cardiovascular;  Laterality: N/A;  emergency    CORONARY STENT PLACEMENT N/A 7/11/2018    Procedure: Stent DIXIE coronary;  Surgeon: Kumar Colon MD;  Location: [facility] CATH;  Service: Cardiology;  Laterality: N/A;    EYE SURGERY      cataract    HYSTERECTOMY      INSERTION OF INTRAVASCULAR MICROAXIAL BLOOD PUMP N/A 7/11/2018    Procedure: INSERTION, IMPELLA;  Surgeon: Kumar Colon MD;  [facility];  Service: Cardiology;  Laterality: N/A;    INSERTION OF TEMPORARY PACEMAKER N/A 7/11/2018    Procedure: INSERTION, PACEMAKER, TEMPORARY;  Surgeon: Kumar Colon MD;  Location: [facility] CATH;  Service: Cardiology;  Laterality: N/A;    KNEE ARTHROSCOPY W/ DEBRIDEMENT  2012    LEFT HEART CATHETERIZATION Left 6/19/2018    Procedure: Left heart cath;  Surgeon: Kumar Colon MD;  Location: [facility] CATH;  Service: Cardiology;  Laterality: Left;    LEFT HEART CATHETERIZATION Left 7/11/2018    Procedure: Left heart cath;  Surgeon: Kumar Colon MD;  Location: [facility] CATH;  Service: Cardiology;  Laterality: Left;    OOPHORECTOMY      PERICARDIOCENTESIS N/A 7/11/2018    Procedure: Pericardiocentesis;  Surgeon: Kumar Colon MD;  Location: [facility] CATH;  Service: Cardiology;  Laterality: N/A;    ROBOT-ASSISTED LAPAROSCOPIC REPAIR OF INGUINAL HERNIA USING DA YESSICA XI Right 6/28/2021    Procedure: XI ROBOTIC REPAIR, HERNIA, INGUINAL;  Surgeon: Keshav Alonzo MD;  Location: [facility] OR;  Service: General;  Laterality: Right;    TRANSESOPHAGEAL ECHOCARDIOGRAPHY N/A 10/26/2018    Procedure: ECHOCARDIOGRAM, TRANSESOPHAGEAL;  Surgeon: Bonilla Fitzgerald MD;  Location: [facility] CATH LAB;  Service: Cardiology;  Laterality: N/A;    TREATMENT OF CARDIAC ARRHYTHMIA N/A 10/8/2018    Procedure: Cardioversion/Defibrillation;  Surgeon: Rodo Singleton MD;  Location: [facility];  Service: Cardiology;   Laterality: N/A;     Medications Ordered Prior to Encounter[1]  Review of patient's allergies indicates:  No Known Allergies  Family History   Problem Relation Name Age of Onset    Cancer Father          lung    Cancer Sister          breast    Breast cancer Sister  78    Cancer Brother          brain    Macular degeneration Brother      Breast cancer Paternal Aunt      Glaucoma Neg Hx      Retinal detachment Neg Hx      Melanoma Neg Hx      Psoriasis Neg Hx      Lupus Neg Hx      Eczema Neg Hx       Social History[2]    Review of Systems:  Constitutional:  Denies fever or chills   Eyes:  Denies change in visual acuity   HENT:  Denies nasal congestion or sore throat   Respiratory:  Denies cough or shortness of breath   Cardiovascular:  Denies chest pain or edema   GI:  Denies abdominal pain, nausea, vomiting, bloody stools or diarrhea   :  Denies dysuria   Integument:  Denies rash   Neurologic:  Denies headache, focal weakness or sensory changes   Endocrine:  Denies polyuria or polydipsia   Lymphatic:  Denies swollen glands   Psychiatric:  Denies depression or anxiety     Physical Exam:   Constitutional:  Well developed, well nourished, no acute distress, non-toxic appearance   Integument:  Well hydrated, no rash   Lymphatic:  No lymphadenopathy noted   Neurologic:  Alert & oriented x 3, CN 2-12 normal, normal motor function, normal sensory function, no focal deficits noted   Psychiatric:  Speech and behavior appropriate   Eyes: EOMI  Gi: abdomen soft    Bilateral Knee Exam    right Knee Exam     Tenderness   The patient is experiencing tenderness in the medial joint line.    Range of Motion   Extension: abnormal   Flexion: abnormal     Muscle Strength     The patient has normal knee strength.    Tests   Sukhwinder:  Medial - positive   Lachman:  Anterior - negative      Varus: negative  Valgus: negative  Patellar Apprehension: negative    Other   Erythema: absent  Sensation: normal  Pulse: present  Swelling:  mild      left Knee Exam   left knee exam performed same as contralateral side and is normal.            X-rays were performed, personally reviewed by me and findings discussed with the patient.  3 views of the right knee show tricompartmental degenerative change most pronounced in the medial compartment with Kellgren 3 changes    Acute pain of right knee    Primary osteoarthritis of right knee            Using an aseptic technique, I injected 5 cc of lidocaine 1% without and 1 cc of kenalog 40mg into the right Knee. The patient tolerated this well. I will have them return to clinic in 3 months.             [1]   Current Outpatient Medications on File Prior to Visit   Medication Sig Dispense Refill    BIOTIN ORAL Take 1 capsule by mouth once daily.       calcium-vitamin D3 (OS-MILO 500 + D3) 500 mg-5 mcg (200 unit) per tablet Take 1 tablet by mouth 2 (two) times daily with meals.      carboxymethylcellulose (REFRESH PLUS) 0.5 % Dpet 1 drop 3 (three) times daily as needed.      clobetasoL (CLOBEX) 0.05 % shampoo Wash scalp 2 to 3 times a week as needed for rash and itching as scalp. 118 mL 3    clobetasoL (CLOBEX) 0.05 % shampoo Wash scalp once weekly. Avoid use of medication on face, body folds, groin/genitalia. 118 mL 5    clopidogreL (PLAVIX) 75 mg tablet Take 1 tablet (75 mg total) by mouth once daily. 90 tablet 3    cranberry 400 mg Cap Take 1 capsule by mouth once daily.       fluocinonide (LIDEX) 0.05 % external solution Apply topically 2 (two) times daily. 60 mL 0    fluocinonide (LIDEX) 0.05 % external solution Apply topically 2 (two) times daily as needed (scalp rash, itching). Avoid use of medication on face, body folds, groin/genitalia. 60 mL 3    furosemide (LASIX) 20 MG tablet Take 1 tablet (20 mg total) by mouth once daily. 90 tablet 3    hydrocortisone 2.5 % cream       hydrocortisone 2.5 % cream Apply topically 2 (two) times daily as needed (itchy rash at ears, brows). Mix 50/50 with ketoconazole  cream. 28 g 3    ketoconazole (NIZORAL) 2 % cream Apply topically 2 (two) times daily. Mix 50/50 with hydrocortisone cream twice daily as needed for flares. 60 g 3    ketoconazole (NIZORAL) 2 % shampoo Apply topically twice a week. 120 mL 6    mupirocin (BACTROBAN) 2 % ointment Apply pea sized amount to inside of nostrils twice daily for 10 days 22 g 1    pantoprazole (PROTONIX) 40 MG tablet Take 1 tablet (40 mg total) by mouth once daily. for 10 days 10 tablet 0    rivaroxaban (XARELTO) 20 mg Tab Take 1 tablet (20 mg total) by mouth daily with dinner or evening meal. 31 tablet 11    rosuvastatin (CRESTOR) 5 MG tablet Take 1 tablet (5 mg total) by mouth every evening. 90 tablet 3    sacubitriL-valsartan (ENTRESTO) 24-26 mg per tablet Take 1 tablet by mouth 2 (two) times daily. 180 tablet 3    sotaloL (BETAPACE) 80 MG tablet Take 1 tablet (80 mg total) by mouth 2 (two) times daily. 180 tablet 3    spironolactone (ALDACTONE) 25 MG tablet Take 0.5 tablets (12.5 mg total) by mouth once daily. 46 tablet 3    vit C,D-If-iadjy-lutein-zeaxan (PRESERVISION AREDS 2) 250-90-40-1 mg Cap Take by mouth 2 (two) times daily.      vitamin D (VITAMIN D3) 1000 units Tab Take 1,000 Units by mouth once daily.      [DISCONTINUED] RESTASIS 0.05 % ophthalmic emulsion Place 1 drop into both eyes 2 (two) times daily.       No current facility-administered medications on file prior to visit.   [2]   Social History  Socioeconomic History    Marital status:    Tobacco Use    Smoking status: Former     Current packs/day: 0.00     Average packs/day: 1 pack/day for 40.0 years (40.0 ttl pk-yrs)     Types: Cigarettes     Start date:      Quit date:      Years since quittin.5    Smokeless tobacco: Never    Tobacco comments:     quit smoking in .   Substance and Sexual Activity    Alcohol use: Yes     Comment: seldom    Drug use: No     Social Drivers of Health     Financial Resource Strain: Low Risk  (2025)    Overall  Financial Resource Strain (CARDIA)     Difficulty of Paying Living Expenses: Not very hard   Food Insecurity: No Food Insecurity (4/28/2025)    Hunger Vital Sign     Worried About Running Out of Food in the Last Year: Never true     Ran Out of Food in the Last Year: Never true   Transportation Needs: No Transportation Needs (4/28/2025)    PRAPARE - Transportation     Lack of Transportation (Medical): No     Lack of Transportation (Non-Medical): No   Physical Activity: Sufficiently Active (4/28/2025)    Exercise Vital Sign     Days of Exercise per Week: 5 days     Minutes of Exercise per Session: 30 min   Stress: No Stress Concern Present (4/28/2025)    [employer] [place] [employer] - Occupational Stress Questionnaire     Feeling of Stress : Only a little   Housing Stability: Low Risk  (4/28/2025)    Housing Stability Vital Sign     Unable to Pay for Housing in the Last Year: No     Number of Times Moved in the Last Year: 0     Homeless in the Last Year: No

## 2025-07-17 NOTE — PROCEDURES
Large Joint Aspiration/Injection: R knee    Date/Time: 7/8/2025 1:30 PM    Performed by: Adam Nolasco MD  Authorized by: Adam Nolasco MD    Consent Done?:  Yes (Verbal)  Indications:  Pain  Timeout: prior to procedure the correct patient, procedure, and site was verified    Prep: patient was prepped and draped in usual sterile fashion    Local anesthetic:  Lidocaine 1% without epinephrine  Anesthetic total (ml):  5      Details:  Needle Size:  21 G  Approach:  Anterolateral  Location:  Knee  Site:  R knee  Medications:  40 mg triamcinolone acetonide 40 mg/mL  Patient tolerance:  Patient tolerated the procedure well with no immediate complications

## 2025-07-30 ENCOUNTER — OFFICE VISIT (OUTPATIENT)
Facility: CLINIC | Age: 85
End: 2025-07-30
Payer: MEDICARE

## 2025-07-30 DIAGNOSIS — D18.01 CHERRY ANGIOMA: ICD-10-CM

## 2025-07-30 DIAGNOSIS — Z85.828 HISTORY OF SKIN CANCER: ICD-10-CM

## 2025-07-30 DIAGNOSIS — D22.9 MULTIPLE BENIGN NEVI: Primary | ICD-10-CM

## 2025-07-30 DIAGNOSIS — L73.8 SEBACEOUS HYPERPLASIA: ICD-10-CM

## 2025-07-30 DIAGNOSIS — Z12.83 SCREENING FOR SKIN CANCER: ICD-10-CM

## 2025-07-30 DIAGNOSIS — L81.4 LENTIGINES: ICD-10-CM

## 2025-07-30 DIAGNOSIS — D23.39: ICD-10-CM

## 2025-07-30 DIAGNOSIS — L82.1 SK (SEBORRHEIC KERATOSIS): ICD-10-CM

## 2025-07-30 DIAGNOSIS — L91.8 ACROCHORDON: ICD-10-CM

## 2025-07-30 DIAGNOSIS — L98.9 SKIN AND SUBCUTANEOUS TISSUE DISEASE: ICD-10-CM

## 2025-07-30 DIAGNOSIS — L72.0 MILIA: ICD-10-CM

## 2025-07-30 PROCEDURE — 99999 PR PBB SHADOW E&M-EST. PATIENT-LVL III: CPT | Mod: PBBFAC,,, | Performed by: DERMATOLOGY

## 2025-07-30 NOTE — PROGRESS NOTES
Subjective:       Patient ID:  Sari Biswas is a 85 y.o. female who presents for   Chief Complaint   Patient presents with    Skin Check     TBSE     HPI    Established patient.  Here today for total body skin exam.     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 Using ketoconazole and clobetasol shampoo. Also using ketoconazole and HCT creams for flares at brows, ears. Improved with 1-2x weekly washing (previously washing only once weekly). Stopped Lidex solution for scalp - didn't seem to work. Attempted Zoryve foam but too expensive.     Review of Systems   Constitutional:  Negative for fever, chills and fatigue.   Respiratory:  Negative for cough and shortness of breath.    Skin:  Positive for activity-related sunscreen use.   Hematologic/Lymphatic: Bruises/bleeds easily (plavix, xarelto).        Objective:    Physical Exam   Constitutional: She appears well-developed and well-nourished. No distress.   Neurological: She is alert and oriented to person, place, and time. She is not disoriented.   Psychiatric: She has a normal mood and affect.   Skin:   Areas Examined (abnormalities noted in diagram):   Scalp / Hair Palpated and Inspected  Head / Face Inspection Performed  Neck Inspection Performed  Chest / Axilla Inspection Performed  Abdomen Inspection Performed  Genitals / Buttocks / Groin Inspection Performed  Back Inspection Performed  RUE Inspected  LUE Inspection Performed  RLE Inspected  LLE Inspection Performed  Nails and Digits Inspection Performed                   Diagram Legend     Erythematous scaling macule/papule c/w actinic keratosis       Vascular papule c/w angioma      Pigmented verrucoid papule/plaque c/w seborrheic keratosis      Yellow umbilicated papule c/w sebaceous hyperplasia      Irregularly shaped tan macule c/w lentigo     1-2 mm smooth white papules consistent with Milia      Movable  subcutaneous cyst with punctum c/w epidermal inclusion cyst      Subcutaneous movable cyst c/w pilar cyst      Firm pink to brown papule c/w dermatofibroma      Pedunculated fleshy papule(s) c/w skin tag(s)      Evenly pigmented macule c/w junctional nevus     Mildly variegated pigmented, slightly irregular-bordered macule c/w mildly atypical nevus      Flesh colored to evenly pigmented papule c/w intradermal nevus       Pink pearly papule/plaque c/w basal cell carcinoma      Erythematous hyperkeratotic cursted plaque c/w SCC      Surgical scar with no sign of skin cancer recurrence      Open and closed comedones      Inflammatory papules and pustules      Verrucoid papule consistent consistent with wart     Erythematous eczematous patches and plaques     Dystrophic onycholytic nail with subungual debris c/w onychomycosis     Umbilicated papule    Erythematous-base heme-crusted tan verrucoid plaque consistent with inflamed seborrheic keratosis     Erythematous Silvery Scaling Plaque c/w Psoriasis     See annotation      Assessment / Plan:        Skin and subcutaneous disease - R upper forehead  DDX inflammatory (sebopsoriasis) vs AK/SCCIS  Plan for hct mixed w ketoconazole cream BID x 2-4 weeks with clinical re-evaluation to ensure resolution/significant response. Consider bx if persistent.     Multiple benign nevi  - Discussed diagnosis, etiology, and benign-nature of condition.  - Reassured; no lesions suspicious for malignancy noted on exam today.   - Recommended routine self examination of skin. Discussed the ABCDEs of melanoma and ugly duckling sign.   - Recommended daily sun protection, including the use of OTC broad-spectrum sunscreen (SPF 30 or greater) and sun-protective clothing.      Lentigines  - Benign; reassured treatment not necessary.   - Recommended daily sun protection, including the use of OTC broad-spectrum sunscreen (SPF 30 or greater) and sun-protective clothing.       SK (seborrheic  keratosis)  Acrochordon  Cherry angioma  Sebaceous hyperplasia  Milia  - Benign; reassured treatment not necessary.      History of skin cancer  Screening for skin cancer  - Total body skin examination performed today.  - Findings listed above.   - Sites of prior malignancy examined - no concern for recurrence today.    - Recommended routine self examination of skin.    - Recommended daily sun protection, including the use of OTC broad-spectrum sunscreen (SPF 30 or greater) and sun-protective clothing.       Follow up in about 1 month (around 8/30/2025) for spot check; 1 year annual TBSE, sooner prn.

## 2025-08-03 ENCOUNTER — HOSPITAL ENCOUNTER (OUTPATIENT)
Dept: CARDIOLOGY | Facility: HOSPITAL | Age: 85
Discharge: HOME | End: 2025-08-03
Attending: INTERNAL MEDICINE
Payer: MEDICARE

## 2025-08-03 ENCOUNTER — CLINICAL SUPPORT (OUTPATIENT)
Dept: CARDIOLOGY | Facility: HOSPITAL | Age: 85
End: 2025-08-03
Payer: MEDICARE

## 2025-08-03 DIAGNOSIS — Z95.810 PRESENCE OF AUTOMATIC (IMPLANTABLE) CARDIAC DEFIBRILLATOR: ICD-10-CM

## 2025-08-03 PROCEDURE — 93296 REM INTERROG EVL PM/IDS: CPT | Mod: PO | Performed by: INTERNAL MEDICINE

## 2025-08-03 PROCEDURE — 93295 DEV INTERROG REMOTE 1/2/MLT: CPT | Mod: ,,, | Performed by: INTERNAL MEDICINE

## 2025-08-20 ENCOUNTER — OFFICE VISIT (OUTPATIENT)
Dept: PODIATRY | Facility: CLINIC | Age: 85
End: 2025-08-20
Payer: MEDICARE

## 2025-08-20 ENCOUNTER — LAB VISIT (OUTPATIENT)
Dept: LAB | Facility: HOSPITAL | Age: 85
End: 2025-08-20
Payer: MEDICARE

## 2025-08-20 VITALS — HEIGHT: 65 IN | WEIGHT: 153 LBS | BODY MASS INDEX: 25.49 KG/M2

## 2025-08-20 DIAGNOSIS — B35.1 ONYCHOMYCOSIS DUE TO DERMATOPHYTE: Primary | ICD-10-CM

## 2025-08-20 DIAGNOSIS — B35.1 ONYCHOMYCOSIS DUE TO DERMATOPHYTE: ICD-10-CM

## 2025-08-20 LAB
ALBUMIN SERPL BCP-MCNC: 3.5 G/DL (ref 3.5–5.2)
ALP SERPL-CCNC: 75 UNIT/L (ref 40–150)
ALT SERPL W/O P-5'-P-CCNC: 11 UNIT/L (ref 0–55)
AST SERPL-CCNC: 23 UNIT/L (ref 0–50)
BILIRUB DIRECT SERPL-MCNC: 0.2 MG/DL (ref 0.1–0.3)
BILIRUB SERPL-MCNC: 0.3 MG/DL (ref 0.1–1)
PROT SERPL-MCNC: 6.6 GM/DL (ref 6–8.4)

## 2025-08-20 PROCEDURE — 99999 PR PBB SHADOW E&M-EST. PATIENT-LVL III: CPT | Mod: PBBFAC,,,

## 2025-08-20 PROCEDURE — 36415 COLL VENOUS BLD VENIPUNCTURE: CPT | Mod: PO

## 2025-08-20 PROCEDURE — 82040 ASSAY OF SERUM ALBUMIN: CPT

## 2025-08-20 RX ORDER — TERBINAFINE HYDROCHLORIDE 250 MG/1
250 TABLET ORAL DAILY
Qty: 90 TABLET | Refills: 0 | Status: SHIPPED | OUTPATIENT
Start: 2025-08-20 | End: 2025-11-18

## 2025-09-02 LAB
OHS CV AF BURDEN PERCENT: < 1
OHS CV DC REMOTE DEVICE TYPE: NORMAL
OHS CV RV PACING PERCENT: 1 %

## 2025-09-03 ENCOUNTER — OFFICE VISIT (OUTPATIENT)
Facility: CLINIC | Age: 85
End: 2025-09-03
Payer: MEDICARE

## 2025-09-03 DIAGNOSIS — L40.8 SEBOPSORIASIS: Primary | ICD-10-CM

## 2025-09-03 PROCEDURE — 1101F PT FALLS ASSESS-DOCD LE1/YR: CPT | Mod: CPTII,S$GLB,, | Performed by: DERMATOLOGY

## 2025-09-03 PROCEDURE — 3288F FALL RISK ASSESSMENT DOCD: CPT | Mod: CPTII,S$GLB,, | Performed by: DERMATOLOGY

## 2025-09-03 PROCEDURE — 1159F MED LIST DOCD IN RCRD: CPT | Mod: CPTII,S$GLB,, | Performed by: DERMATOLOGY

## 2025-09-03 PROCEDURE — G2211 COMPLEX E/M VISIT ADD ON: HCPCS | Mod: S$GLB,,, | Performed by: DERMATOLOGY

## 2025-09-03 PROCEDURE — 99212 OFFICE O/P EST SF 10 MIN: CPT | Mod: S$GLB,,, | Performed by: DERMATOLOGY

## 2025-09-03 PROCEDURE — 99999 PR PBB SHADOW E&M-EST. PATIENT-LVL III: CPT | Mod: PBBFAC,,, | Performed by: DERMATOLOGY
